# Patient Record
Sex: MALE | Race: WHITE | Employment: OTHER | URBAN - METROPOLITAN AREA
[De-identification: names, ages, dates, MRNs, and addresses within clinical notes are randomized per-mention and may not be internally consistent; named-entity substitution may affect disease eponyms.]

---

## 2017-01-23 DIAGNOSIS — R06.02 SHORTNESS OF BREATH: ICD-10-CM

## 2017-01-23 RX ORDER — TRAMADOL HYDROCHLORIDE 50 MG/1
TABLET ORAL
Qty: 90 TABLET | Refills: 2 | Status: SHIPPED | OUTPATIENT
Start: 2017-01-23 | End: 2019-02-07

## 2017-01-23 RX ORDER — LOSARTAN POTASSIUM 100 MG/1
TABLET ORAL
Qty: 30 TABLET | Refills: 2 | Status: SHIPPED | OUTPATIENT
Start: 2017-01-23 | End: 2017-03-13 | Stop reason: SDUPTHER

## 2017-02-20 ENCOUNTER — TELEPHONE (OUTPATIENT)
Dept: FAMILY MEDICINE CLINIC | Age: 62
End: 2017-02-20

## 2017-03-13 ENCOUNTER — OFFICE VISIT (OUTPATIENT)
Dept: FAMILY MEDICINE CLINIC | Age: 62
End: 2017-03-13

## 2017-03-13 VITALS
HEIGHT: 72 IN | DIASTOLIC BLOOD PRESSURE: 72 MMHG | SYSTOLIC BLOOD PRESSURE: 122 MMHG | WEIGHT: 303 LBS | HEART RATE: 80 BPM | OXYGEN SATURATION: 98 % | BODY MASS INDEX: 41.04 KG/M2 | RESPIRATION RATE: 22 BRPM

## 2017-03-13 DIAGNOSIS — Z12.5 PROSTATE CANCER SCREENING: ICD-10-CM

## 2017-03-13 DIAGNOSIS — Z00.00 ANNUAL PHYSICAL EXAM: Primary | ICD-10-CM

## 2017-03-13 DIAGNOSIS — I10 ESSENTIAL HYPERTENSION: ICD-10-CM

## 2017-03-13 DIAGNOSIS — E11.42 TYPE 2 DIABETES MELLITUS WITH DIABETIC POLYNEUROPATHY, WITHOUT LONG-TERM CURRENT USE OF INSULIN (HCC): ICD-10-CM

## 2017-03-13 PROCEDURE — 99999 PR OFFICE/OUTPT VISIT,PROCEDURE ONLY: CPT | Performed by: FAMILY MEDICINE

## 2017-03-13 RX ORDER — LOSARTAN POTASSIUM 25 MG/1
25 TABLET ORAL DAILY
Qty: 90 TABLET | Refills: 3 | Status: SHIPPED | OUTPATIENT
Start: 2017-03-13 | End: 2017-03-13 | Stop reason: SDUPTHER

## 2017-03-13 RX ORDER — LOSARTAN POTASSIUM 25 MG/1
25 TABLET ORAL DAILY
Qty: 90 TABLET | Refills: 3 | Status: ON HOLD | OUTPATIENT
Start: 2017-03-13 | End: 2017-06-09 | Stop reason: HOSPADM

## 2017-03-13 RX ORDER — TERAZOSIN 2 MG/1
2 CAPSULE ORAL NIGHTLY
COMMUNITY
End: 2017-03-21

## 2017-03-13 RX ORDER — TERAZOSIN 10 MG/1
10 CAPSULE ORAL NIGHTLY
Qty: 30 CAPSULE | Refills: 3 | Status: SHIPPED | OUTPATIENT
Start: 2017-03-13 | End: 2017-03-21 | Stop reason: SDUPTHER

## 2017-03-13 ASSESSMENT — ENCOUNTER SYMPTOMS
NAUSEA: 0
RHINORRHEA: 0
BOWEL INCONTINENCE: 0
VOMITING: 0
ABDOMINAL PAIN: 0

## 2017-03-20 ENCOUNTER — TELEPHONE (OUTPATIENT)
Dept: FAMILY MEDICINE CLINIC | Age: 62
End: 2017-03-20

## 2017-03-21 ENCOUNTER — TELEPHONE (OUTPATIENT)
Dept: FAMILY MEDICINE CLINIC | Age: 62
End: 2017-03-21

## 2017-03-21 DIAGNOSIS — I10 ESSENTIAL HYPERTENSION: ICD-10-CM

## 2017-03-21 RX ORDER — TERAZOSIN 5 MG/1
5 CAPSULE ORAL NIGHTLY
Qty: 30 CAPSULE | Refills: 5 | Status: SHIPPED | OUTPATIENT
Start: 2017-03-21 | End: 2017-03-28

## 2017-03-21 RX ORDER — TERAZOSIN 5 MG/1
5 CAPSULE ORAL NIGHTLY
Qty: 30 CAPSULE | Refills: 5 | Status: SHIPPED | OUTPATIENT
Start: 2017-03-21 | End: 2017-03-21 | Stop reason: SDUPTHER

## 2017-03-27 ENCOUNTER — TELEPHONE (OUTPATIENT)
Dept: FAMILY MEDICINE CLINIC | Age: 62
End: 2017-03-27

## 2017-03-27 RX ORDER — DOXAZOSIN 2 MG/1
2 TABLET ORAL DAILY
Qty: 30 TABLET | Refills: 3 | Status: SHIPPED | OUTPATIENT
Start: 2017-03-27 | End: 2017-03-28

## 2017-03-28 RX ORDER — TAMSULOSIN HYDROCHLORIDE 0.4 MG/1
0.4 CAPSULE ORAL DAILY
Qty: 30 CAPSULE | Refills: 3 | Status: SHIPPED | OUTPATIENT
Start: 2017-03-28 | End: 2019-02-07

## 2017-06-05 PROBLEM — N18.6 ESRD (END STAGE RENAL DISEASE) (HCC): Status: ACTIVE | Noted: 2017-06-05

## 2017-06-06 PROBLEM — J81.1 PULMONARY EDEMA: Status: ACTIVE | Noted: 2017-06-06

## 2017-06-06 PROBLEM — R33.8 ACUTE URINARY RETENTION: Status: ACTIVE | Noted: 2017-06-06

## 2017-06-06 PROBLEM — N40.0 BPH (BENIGN PROSTATIC HYPERPLASIA): Status: ACTIVE | Noted: 2017-06-06

## 2017-06-06 PROBLEM — N17.9 ARF (ACUTE RENAL FAILURE) (HCC): Status: ACTIVE | Noted: 2017-06-05

## 2017-06-06 PROBLEM — E87.20 METABOLIC ACIDOSIS: Status: ACTIVE | Noted: 2017-06-06

## 2017-06-06 PROBLEM — J81.0 ACUTE PULMONARY EDEMA (HCC): Status: ACTIVE | Noted: 2017-06-06

## 2017-06-06 PROBLEM — E87.5 HYPERKALEMIA: Status: ACTIVE | Noted: 2017-06-06

## 2017-06-06 PROBLEM — N40.0 BPH (BENIGN PROSTATIC HYPERPLASIA): Chronic | Status: ACTIVE | Noted: 2017-06-06

## 2017-06-12 ENCOUNTER — ANTI-COAG VISIT (OUTPATIENT)
Dept: PHARMACY | Facility: CLINIC | Age: 62
End: 2017-06-12

## 2017-06-12 ENCOUNTER — HOSPITAL ENCOUNTER (OUTPATIENT)
Dept: OTHER | Age: 62
Discharge: HOME | End: 2017-06-30
Attending: INTERNAL MEDICINE | Admitting: INTERNAL MEDICINE

## 2017-06-12 DIAGNOSIS — I48.0 PAROXYSMAL ATRIAL FIBRILLATION (HCC): ICD-10-CM

## 2017-06-12 LAB — INR BLD: 1.1

## 2017-06-15 ENCOUNTER — ANTI-COAG VISIT (OUTPATIENT)
Dept: PHARMACY | Facility: CLINIC | Age: 62
End: 2017-06-15

## 2017-06-15 DIAGNOSIS — I48.0 PAROXYSMAL ATRIAL FIBRILLATION (HCC): ICD-10-CM

## 2017-06-15 LAB — INR BLD: 1.4

## 2017-06-20 ENCOUNTER — ANTI-COAG VISIT (OUTPATIENT)
Dept: PHARMACY | Facility: CLINIC | Age: 62
End: 2017-06-20

## 2017-06-20 DIAGNOSIS — I48.0 PAROXYSMAL ATRIAL FIBRILLATION (HCC): ICD-10-CM

## 2017-06-20 LAB — INR BLD: 2

## 2017-06-20 RX ORDER — WARFARIN SODIUM 7.5 MG/1
TABLET ORAL
Qty: 50 TABLET | Refills: 1 | Status: SHIPPED | OUTPATIENT
Start: 2017-06-20 | End: 2018-12-01

## 2017-06-29 ENCOUNTER — ANTI-COAG VISIT (OUTPATIENT)
Dept: PHARMACY | Facility: CLINIC | Age: 62
End: 2017-06-29

## 2017-06-29 DIAGNOSIS — I48.0 PAROXYSMAL ATRIAL FIBRILLATION (HCC): ICD-10-CM

## 2017-06-29 LAB — INR BLD: 2.6

## 2018-12-01 ENCOUNTER — HOSPITAL ENCOUNTER (EMERGENCY)
Age: 63
Discharge: HOME | End: 2018-12-01
Attending: EMERGENCY MEDICINE
Payer: OTHER GOVERNMENT

## 2018-12-01 ENCOUNTER — APPOINTMENT (OUTPATIENT)
Dept: GENERAL RADIOLOGY | Age: 63
End: 2018-12-01
Payer: OTHER GOVERNMENT

## 2018-12-01 VITALS
OXYGEN SATURATION: 91 % | HEIGHT: 78 IN | HEART RATE: 113 BPM | SYSTOLIC BLOOD PRESSURE: 98 MMHG | RESPIRATION RATE: 21 BRPM | DIASTOLIC BLOOD PRESSURE: 67 MMHG | TEMPERATURE: 100.5 F | BODY MASS INDEX: 31.24 KG/M2 | WEIGHT: 270 LBS

## 2018-12-01 DIAGNOSIS — J44.1 COPD EXACERBATION (HCC): Primary | ICD-10-CM

## 2018-12-01 LAB
A/G RATIO: 1.2 (ref 1.1–2.2)
ALBUMIN SERPL-MCNC: 4 G/DL (ref 3.4–5)
ALP BLD-CCNC: 82 U/L (ref 40–129)
ALT SERPL-CCNC: 14 U/L (ref 10–40)
ANION GAP SERPL CALCULATED.3IONS-SCNC: 11 MMOL/L (ref 3–16)
AST SERPL-CCNC: 16 U/L (ref 15–37)
BASE EXCESS ARTERIAL: 1 MMOL/L (ref -3–3)
BASOPHILS ABSOLUTE: 0.1 K/UL (ref 0–0.2)
BASOPHILS RELATIVE PERCENT: 1.3 %
BILIRUB SERPL-MCNC: 0.5 MG/DL (ref 0–1)
BUN BLDV-MCNC: 17 MG/DL (ref 7–20)
CALCIUM SERPL-MCNC: 8.8 MG/DL (ref 8.3–10.6)
CARBOXYHEMOGLOBIN ARTERIAL: 3.2 % (ref 0–1.5)
CHLORIDE BLD-SCNC: 99 MMOL/L (ref 99–110)
CO2: 29 MMOL/L (ref 21–32)
CREAT SERPL-MCNC: 1.1 MG/DL (ref 0.8–1.3)
EOSINOPHILS ABSOLUTE: 0 K/UL (ref 0–0.6)
EOSINOPHILS RELATIVE PERCENT: 0.7 %
GFR AFRICAN AMERICAN: >60
GFR NON-AFRICAN AMERICAN: >60
GLOBULIN: 3.3 G/DL
GLUCOSE BLD-MCNC: 231 MG/DL (ref 70–99)
HCO3 ARTERIAL: 27.1 MMOL/L (ref 21–29)
HCT VFR BLD CALC: 43.9 % (ref 40.5–52.5)
HEMOGLOBIN, ART, EXTENDED: 14.8 G/DL (ref 13.5–17.5)
HEMOGLOBIN: 14.2 G/DL (ref 13.5–17.5)
LACTIC ACID: 1.7 MMOL/L (ref 0.4–2)
LYMPHOCYTES ABSOLUTE: 0.7 K/UL (ref 1–5.1)
LYMPHOCYTES RELATIVE PERCENT: 11.5 %
MCH RBC QN AUTO: 31.9 PG (ref 26–34)
MCHC RBC AUTO-ENTMCNC: 32.4 G/DL (ref 31–36)
MCV RBC AUTO: 98.5 FL (ref 80–100)
METHEMOGLOBIN ARTERIAL: 0.3 %
MONOCYTES ABSOLUTE: 0.4 K/UL (ref 0–1.3)
MONOCYTES RELATIVE PERCENT: 6.1 %
NEUTROPHILS ABSOLUTE: 4.9 K/UL (ref 1.7–7.7)
NEUTROPHILS RELATIVE PERCENT: 80.4 %
O2 CONTENT ARTERIAL: 19 ML/DL
O2 SAT, ARTERIAL: 92.9 %
O2 THERAPY: ABNORMAL
PCO2 ARTERIAL: 48.9 MMHG (ref 35–45)
PDW BLD-RTO: 16.7 % (ref 12.4–15.4)
PH ARTERIAL: 7.36 (ref 7.35–7.45)
PLATELET # BLD: 175 K/UL (ref 135–450)
PMV BLD AUTO: 9.5 FL (ref 5–10.5)
PO2 ARTERIAL: 68.4 MMHG (ref 75–108)
POTASSIUM SERPL-SCNC: 4.5 MMOL/L (ref 3.5–5.1)
RBC # BLD: 4.46 M/UL (ref 4.2–5.9)
SODIUM BLD-SCNC: 139 MMOL/L (ref 136–145)
TCO2 ARTERIAL: 28.6 MMOL/L
TOTAL PROTEIN: 7.3 G/DL (ref 6.4–8.2)
TROPONIN: <0.01 NG/ML
WBC # BLD: 6.1 K/UL (ref 4–11)

## 2018-12-01 PROCEDURE — 6360000002 HC RX W HCPCS: Performed by: EMERGENCY MEDICINE

## 2018-12-01 PROCEDURE — 82803 BLOOD GASES ANY COMBINATION: CPT

## 2018-12-01 PROCEDURE — 99285 EMERGENCY DEPT VISIT HI MDM: CPT

## 2018-12-01 PROCEDURE — 83605 ASSAY OF LACTIC ACID: CPT

## 2018-12-01 PROCEDURE — 96374 THER/PROPH/DIAG INJ IV PUSH: CPT

## 2018-12-01 PROCEDURE — 71045 X-RAY EXAM CHEST 1 VIEW: CPT

## 2018-12-01 PROCEDURE — 84484 ASSAY OF TROPONIN QUANT: CPT

## 2018-12-01 PROCEDURE — 93005 ELECTROCARDIOGRAM TRACING: CPT | Performed by: EMERGENCY MEDICINE

## 2018-12-01 PROCEDURE — 85025 COMPLETE CBC W/AUTO DIFF WBC: CPT

## 2018-12-01 PROCEDURE — 6370000000 HC RX 637 (ALT 250 FOR IP): Performed by: EMERGENCY MEDICINE

## 2018-12-01 PROCEDURE — 80053 COMPREHEN METABOLIC PANEL: CPT

## 2018-12-01 RX ORDER — DEXAMETHASONE SODIUM PHOSPHATE 10 MG/ML
10 INJECTION INTRAMUSCULAR; INTRAVENOUS EVERY 6 HOURS
Status: DISCONTINUED | OUTPATIENT
Start: 2018-12-01 | End: 2018-12-01 | Stop reason: CLARIF

## 2018-12-01 RX ORDER — PREDNISONE 10 MG/1
5 TABLET ORAL 2 TIMES DAILY
COMMUNITY
End: 2019-02-07

## 2018-12-01 RX ORDER — POTASSIUM CHLORIDE 750 MG/1
10 TABLET, EXTENDED RELEASE ORAL DAILY
COMMUNITY

## 2018-12-01 RX ORDER — PREDNISONE 20 MG/1
60 TABLET ORAL ONCE
Status: COMPLETED | OUTPATIENT
Start: 2018-12-01 | End: 2018-12-01

## 2018-12-01 RX ORDER — PREDNISONE 20 MG/1
TABLET ORAL
Qty: 27 TABLET | Refills: 0 | Status: SHIPPED | OUTPATIENT
Start: 2018-12-01 | End: 2019-02-07

## 2018-12-01 RX ORDER — ABIRATERONE ACETATE 250 MG/1
1000 TABLET ORAL DAILY
COMMUNITY
End: 2019-02-07

## 2018-12-01 RX ORDER — IPRATROPIUM BROMIDE AND ALBUTEROL SULFATE 2.5; .5 MG/3ML; MG/3ML
1 SOLUTION RESPIRATORY (INHALATION) EVERY 4 HOURS
Qty: 360 ML | Refills: 0 | Status: SHIPPED | OUTPATIENT
Start: 2018-12-01 | End: 2019-02-21

## 2018-12-01 RX ORDER — LEVOFLOXACIN 500 MG/1
500 TABLET, FILM COATED ORAL ONCE
Status: COMPLETED | OUTPATIENT
Start: 2018-12-01 | End: 2018-12-01

## 2018-12-01 RX ORDER — CIPROFLOXACIN 500 MG/1
500 TABLET, FILM COATED ORAL 2 TIMES DAILY
Qty: 20 TABLET | Refills: 0 | Status: SHIPPED | OUTPATIENT
Start: 2018-12-01 | End: 2018-12-11

## 2018-12-01 RX ORDER — GABAPENTIN 300 MG/1
600 CAPSULE ORAL 2 TIMES DAILY
Status: ON HOLD | COMMUNITY
End: 2019-07-27 | Stop reason: DRUGHIGH

## 2018-12-01 RX ORDER — IPRATROPIUM BROMIDE AND ALBUTEROL SULFATE 2.5; .5 MG/3ML; MG/3ML
1 SOLUTION RESPIRATORY (INHALATION) ONCE
Status: COMPLETED | OUTPATIENT
Start: 2018-12-01 | End: 2018-12-01

## 2018-12-01 RX ORDER — BROMPHENIRAMINE MALEATE, PSEUDOEPHEDRINE HYDROCHLORIDE, AND DEXTROMETHORPHAN HYDROBROMIDE 2; 30; 10 MG/5ML; MG/5ML; MG/5ML
5 SYRUP ORAL 4 TIMES DAILY PRN
Qty: 150 ML | Refills: 0 | Status: SHIPPED | OUTPATIENT
Start: 2018-12-01 | End: 2019-06-13 | Stop reason: ALTCHOICE

## 2018-12-01 RX ORDER — FUROSEMIDE 20 MG/1
40 TABLET ORAL DAILY
Status: ON HOLD | COMMUNITY
End: 2019-07-30 | Stop reason: SDUPTHER

## 2018-12-01 RX ORDER — DEXAMETHASONE SODIUM PHOSPHATE 4 MG/ML
10 INJECTION, SOLUTION INTRA-ARTICULAR; INTRALESIONAL; INTRAMUSCULAR; INTRAVENOUS; SOFT TISSUE EVERY 6 HOURS
Status: DISCONTINUED | OUTPATIENT
Start: 2018-12-01 | End: 2018-12-02 | Stop reason: HOSPADM

## 2018-12-01 RX ORDER — LISINOPRIL 10 MG/1
5 TABLET ORAL DAILY
COMMUNITY
End: 2019-02-07

## 2018-12-01 RX ORDER — GLIPIZIDE 5 MG/1
5 TABLET ORAL DAILY
COMMUNITY
End: 2021-10-16

## 2018-12-01 RX ADMIN — PREDNISONE 60 MG: 20 TABLET ORAL at 22:07

## 2018-12-01 RX ADMIN — DEXAMETHASONE SODIUM PHOSPHATE 10 MG: 4 INJECTION, SOLUTION INTRAMUSCULAR; INTRAVENOUS at 21:07

## 2018-12-01 RX ADMIN — LEVOFLOXACIN 500 MG: 500 TABLET, FILM COATED ORAL at 22:08

## 2018-12-01 RX ADMIN — IPRATROPIUM BROMIDE AND ALBUTEROL SULFATE 1 AMPULE: .5; 3 SOLUTION RESPIRATORY (INHALATION) at 21:07

## 2018-12-02 LAB
EKG ATRIAL RATE: 131 BPM
EKG DIAGNOSIS: NORMAL
EKG Q-T INTERVAL: 312 MS
EKG QRS DURATION: 86 MS
EKG QTC CALCULATION (BAZETT): 464 MS
EKG R AXIS: 186 DEGREES
EKG T AXIS: 74 DEGREES
EKG VENTRICULAR RATE: 133 BPM

## 2018-12-02 PROCEDURE — 93010 ELECTROCARDIOGRAM REPORT: CPT | Performed by: INTERNAL MEDICINE

## 2018-12-02 NOTE — ED PROVIDER NOTES
ECG of 05-JUN-2017 19:49,Incomplete right bundle branch block is no longer PresentSeptal infarct is now PresentST now depressed in Lateral leads        Radiographs (if obtained):  [] The following radiograph was interpreted by myself in the absence of a radiologist:  [x] Radiologist's Report Reviewed:  XR CHEST PORTABLE   Final Result   No acute process. EKG (if obtained): (All EKG's are interpreted by myself in theabsence of a cardiologist)  Atrial fibrillation, tachycardia, right axis deviation, no STEMI. Procedures    MDM:  After my initial evaluation, I do feel the patient can be safely discharged home so long as he improves. The patient himself does not want to be admitted to the hospital.  The patient was given a dose of Decadron through the IV, and was also started on prednisone. Patient was given a breathing treatment. Patient did receive a dose of Levaquin here in the emergency department, since I do feel that he most likely has bronchitis. The patient does continue to smoke. Patient chest x-ray does not show any signs of acute other maladies. The patient's blood work does not show any significant abnormality which I feel need to be addressed the present time. After the patient's treatment, he was feeling markedly improved, and is now requesting to be discharged home. Patient's O2 saturation is now come up to approximately 87% on 4 L, I have recommended that he stay up for these for the next 24-48 hours. The patient was given an extra dose of prednisone 60 mg here in the emergency department, and I am also going to provide him with a prescription for a 12 day taper. Patient was put on antibiotics, and was also given a prescription for nebulizer. Critical care time provided of 33 minutes, excluding any previously dictated procedures.   This time is being requested for physical examination, laboratory interpretation, medical intervention, frequent reassessments, bedside discussion,

## 2018-12-12 ENCOUNTER — HOSPITAL ENCOUNTER (OUTPATIENT)
Age: 63
Discharge: HOME | End: 2018-12-12
Payer: OTHER GOVERNMENT

## 2018-12-12 LAB
A/G RATIO: 1.4 (ref 1.1–2.2)
ALBUMIN SERPL-MCNC: 4 G/DL (ref 3.4–5)
ALP BLD-CCNC: 62 U/L (ref 40–129)
ALT SERPL-CCNC: 15 U/L (ref 10–40)
ANION GAP SERPL CALCULATED.3IONS-SCNC: 20 MMOL/L (ref 3–16)
AST SERPL-CCNC: 9 U/L (ref 15–37)
BASOPHILS ABSOLUTE: 0 K/UL (ref 0–0.2)
BASOPHILS RELATIVE PERCENT: 0.2 %
BILIRUB SERPL-MCNC: 1 MG/DL (ref 0–1)
BUN BLDV-MCNC: 26 MG/DL (ref 7–20)
CALCIUM SERPL-MCNC: 9.3 MG/DL (ref 8.3–10.6)
CEA: 6.1 NG/ML (ref 0–5)
CHLORIDE BLD-SCNC: 98 MMOL/L (ref 99–110)
CO2: 23 MMOL/L (ref 21–32)
CREAT SERPL-MCNC: 1.1 MG/DL (ref 0.8–1.3)
D DIMER: <200 NG/ML DDU (ref 0–229)
EOSINOPHILS ABSOLUTE: 0.1 K/UL (ref 0–0.6)
EOSINOPHILS RELATIVE PERCENT: 1 %
FERRITIN: 104.4 NG/ML (ref 30–400)
FOLATE: 8.26 NG/ML (ref 4.78–24.2)
GFR AFRICAN AMERICAN: >60
GFR NON-AFRICAN AMERICAN: >60
GLOBULIN: 2.8 G/DL
GLUCOSE BLD-MCNC: 96 MG/DL (ref 70–99)
HCT VFR BLD CALC: 47.5 % (ref 40.5–52.5)
HEMOGLOBIN: 15.7 G/DL (ref 13.5–17.5)
IRON SATURATION: 25 % (ref 20–50)
IRON: 92 UG/DL (ref 59–158)
LACTATE DEHYDROGENASE: 242 U/L (ref 100–190)
LYMPHOCYTES ABSOLUTE: 2 K/UL (ref 1–5.1)
LYMPHOCYTES RELATIVE PERCENT: 19.2 %
MCH RBC QN AUTO: 31.8 PG (ref 26–34)
MCHC RBC AUTO-ENTMCNC: 33 G/DL (ref 31–36)
MCV RBC AUTO: 96.4 FL (ref 80–100)
MONOCYTES ABSOLUTE: 0.3 K/UL (ref 0–1.3)
MONOCYTES RELATIVE PERCENT: 3.3 %
NEUTROPHILS ABSOLUTE: 7.9 K/UL (ref 1.7–7.7)
NEUTROPHILS RELATIVE PERCENT: 76.3 %
PDW BLD-RTO: 16 % (ref 12.4–15.4)
PLATELET # BLD: 186 K/UL (ref 135–450)
PMV BLD AUTO: 9.3 FL (ref 5–10.5)
POTASSIUM SERPL-SCNC: 4 MMOL/L (ref 3.5–5.1)
PROSTATE SPECIFIC ANTIGEN: 0.05 NG/ML (ref 0–4)
RBC # BLD: 4.92 M/UL (ref 4.2–5.9)
SEDIMENTATION RATE, ERYTHROCYTE: 4 MM/HR (ref 0–20)
SODIUM BLD-SCNC: 141 MMOL/L (ref 136–145)
T4 TOTAL: 6.2 UG/DL (ref 4.5–10.9)
TOTAL IRON BINDING CAPACITY: 365 UG/DL (ref 260–445)
TSH SERPL DL<=0.05 MIU/L-ACNC: 1.75 UIU/ML (ref 0.27–4.2)
URIC ACID, SERUM: 3.8 MG/DL (ref 3.5–7.2)
VITAMIN B-12: 379 PG/ML (ref 211–911)
VITAMIN D 25-HYDROXY: 13.1 NG/ML
WBC # BLD: 10.4 K/UL (ref 4–11)

## 2018-12-12 PROCEDURE — 84155 ASSAY OF PROTEIN SERUM: CPT

## 2018-12-12 PROCEDURE — 36415 COLL VENOUS BLD VENIPUNCTURE: CPT

## 2018-12-12 PROCEDURE — 83883 ASSAY NEPHELOMETRY NOT SPEC: CPT

## 2018-12-12 PROCEDURE — 83615 LACTATE (LD) (LDH) ENZYME: CPT

## 2018-12-12 PROCEDURE — 83540 ASSAY OF IRON: CPT

## 2018-12-12 PROCEDURE — 82306 VITAMIN D 25 HYDROXY: CPT

## 2018-12-12 PROCEDURE — 82378 CARCINOEMBRYONIC ANTIGEN: CPT

## 2018-12-12 PROCEDURE — 82728 ASSAY OF FERRITIN: CPT

## 2018-12-12 PROCEDURE — 84443 ASSAY THYROID STIM HORMONE: CPT

## 2018-12-12 PROCEDURE — 84153 ASSAY OF PSA TOTAL: CPT

## 2018-12-12 PROCEDURE — 82746 ASSAY OF FOLIC ACID SERUM: CPT

## 2018-12-12 PROCEDURE — 80053 COMPREHEN METABOLIC PANEL: CPT

## 2018-12-12 PROCEDURE — 86301 IMMUNOASSAY TUMOR CA 19-9: CPT

## 2018-12-12 PROCEDURE — 85025 COMPLETE CBC W/AUTO DIFF WBC: CPT

## 2018-12-12 PROCEDURE — 85652 RBC SED RATE AUTOMATED: CPT

## 2018-12-12 PROCEDURE — 85379 FIBRIN DEGRADATION QUANT: CPT

## 2018-12-12 PROCEDURE — 83550 IRON BINDING TEST: CPT

## 2018-12-12 PROCEDURE — 84550 ASSAY OF BLOOD/URIC ACID: CPT

## 2018-12-12 PROCEDURE — 84436 ASSAY OF TOTAL THYROXINE: CPT

## 2018-12-12 PROCEDURE — 86334 IMMUNOFIX E-PHORESIS SERUM: CPT

## 2018-12-12 PROCEDURE — 82668 ASSAY OF ERYTHROPOIETIN: CPT

## 2018-12-12 PROCEDURE — 82607 VITAMIN B-12: CPT

## 2018-12-12 PROCEDURE — 84165 PROTEIN E-PHORESIS SERUM: CPT

## 2018-12-13 LAB — ERYTHROPOIETIN: 14 MU/ML (ref 4–27)

## 2018-12-14 LAB
ALBUMIN SERPL-MCNC: 3.4 G/DL (ref 3.1–4.9)
ALPHA-1-GLOBULIN: 0.2 G/DL (ref 0.2–0.4)
ALPHA-2-GLOBULIN: 0.9 G/DL (ref 0.4–1.1)
BETA GLOBULIN: 1.4 G/DL (ref 0.9–1.6)
CA 19-9: <1 U/ML (ref 0–35)
GAMMA GLOBULIN: 0.8 G/DL (ref 0.6–1.8)
KAPPA, FREE LIGHT CHAINS, SERUM: 13.3 MG/L (ref 3.3–19.4)
KAPPA/LAMBDA RATIO: 0.44 (ref 0.26–1.65)
KAPPA/LAMBDA TEST COMMENT: ABNORMAL
LAMBDA, FREE LIGHT CHAINS, SERUM: 30.2 MG/L (ref 5.71–26.3)
M SPIKE 1 SERUM PROTEIN ELEC: 0.2 G/DL
SPE/IFE INTERPRETATION: NORMAL
TOTAL PROTEIN: 6.8 G/DL (ref 6.4–8.2)

## 2018-12-18 ENCOUNTER — HOSPITAL ENCOUNTER (OUTPATIENT)
Dept: PET IMAGING | Age: 63
Discharge: HOME | End: 2018-12-18
Payer: OTHER GOVERNMENT

## 2018-12-18 VITALS — HEIGHT: 72 IN | WEIGHT: 315 LBS | BODY MASS INDEX: 42.66 KG/M2

## 2018-12-18 DIAGNOSIS — C61 PROSTATE CANCER (HCC): ICD-10-CM

## 2018-12-18 PROCEDURE — 3430000000 HC RX DIAGNOSTIC RADIOPHARMACEUTICAL: Performed by: INTERNAL MEDICINE

## 2018-12-18 PROCEDURE — A9552 F18 FDG: HCPCS | Performed by: INTERNAL MEDICINE

## 2018-12-18 PROCEDURE — 78815 PET IMAGE W/CT SKULL-THIGH: CPT

## 2018-12-18 RX ORDER — FLUDEOXYGLUCOSE F 18 200 MCI/ML
11.22 INJECTION, SOLUTION INTRAVENOUS
Status: COMPLETED | OUTPATIENT
Start: 2018-12-18 | End: 2018-12-18

## 2018-12-18 RX ADMIN — FLUDEOXYGLUCOSE F 18 11.22 MILLICURIE: 200 INJECTION, SOLUTION INTRAVENOUS at 11:28

## 2019-01-01 ENCOUNTER — HOSPITAL ENCOUNTER (OUTPATIENT)
Age: 64
Discharge: HOME | End: 2019-01-01
Payer: OTHER GOVERNMENT

## 2019-01-01 LAB
A/G RATIO: 1.1 (ref 1.1–2.2)
ALBUMIN SERPL-MCNC: 3.7 G/DL (ref 3.4–5)
ALP BLD-CCNC: 71 U/L (ref 40–129)
ALT SERPL-CCNC: 12 U/L (ref 10–40)
ANION GAP SERPL CALCULATED.3IONS-SCNC: 11 MMOL/L (ref 3–16)
AST SERPL-CCNC: 13 U/L (ref 15–37)
BASOPHILS ABSOLUTE: 0.1 K/UL (ref 0–0.2)
BASOPHILS RELATIVE PERCENT: 1.2 %
BILIRUB SERPL-MCNC: 0.8 MG/DL (ref 0–1)
BUN BLDV-MCNC: 19 MG/DL (ref 7–20)
CALCIUM SERPL-MCNC: 9.8 MG/DL (ref 8.3–10.6)
CHLORIDE BLD-SCNC: 94 MMOL/L (ref 99–110)
CO2: 26 MMOL/L (ref 21–32)
CREAT SERPL-MCNC: 1.3 MG/DL (ref 0.8–1.3)
EOSINOPHILS ABSOLUTE: 0.1 K/UL (ref 0–0.6)
EOSINOPHILS RELATIVE PERCENT: 2.3 %
GFR AFRICAN AMERICAN: >60
GFR NON-AFRICAN AMERICAN: 55
GLOBULIN: 3.4 G/DL
GLUCOSE BLD-MCNC: 146 MG/DL (ref 70–99)
HCT VFR BLD CALC: 42.7 % (ref 40.5–52.5)
HEMOGLOBIN: 13.9 G/DL (ref 13.5–17.5)
LYMPHOCYTES ABSOLUTE: 1.2 K/UL (ref 1–5.1)
LYMPHOCYTES RELATIVE PERCENT: 18.5 %
MCH RBC QN AUTO: 31.7 PG (ref 26–34)
MCHC RBC AUTO-ENTMCNC: 32.4 G/DL (ref 31–36)
MCV RBC AUTO: 97.7 FL (ref 80–100)
MONOCYTES ABSOLUTE: 0.4 K/UL (ref 0–1.3)
MONOCYTES RELATIVE PERCENT: 6.5 %
NEUTROPHILS ABSOLUTE: 4.6 K/UL (ref 1.7–7.7)
NEUTROPHILS RELATIVE PERCENT: 71.5 %
PDW BLD-RTO: 16.6 % (ref 12.4–15.4)
PLATELET # BLD: 205 K/UL (ref 135–450)
PMV BLD AUTO: 8.8 FL (ref 5–10.5)
POTASSIUM SERPL-SCNC: 4.7 MMOL/L (ref 3.5–5.1)
RBC # BLD: 4.37 M/UL (ref 4.2–5.9)
SODIUM BLD-SCNC: 131 MMOL/L (ref 136–145)
TOTAL PROTEIN: 7.1 G/DL (ref 6.4–8.2)
WBC # BLD: 6.4 K/UL (ref 4–11)

## 2019-01-01 PROCEDURE — 80053 COMPREHEN METABOLIC PANEL: CPT

## 2019-01-01 PROCEDURE — 80061 LIPID PANEL: CPT

## 2019-01-01 PROCEDURE — 85025 COMPLETE CBC W/AUTO DIFF WBC: CPT

## 2019-01-01 PROCEDURE — 36415 COLL VENOUS BLD VENIPUNCTURE: CPT

## 2019-01-02 LAB
CHOLESTEROL, TOTAL: 168 MG/DL (ref 0–199)
HDLC SERPL-MCNC: 29 MG/DL (ref 40–60)
LDL CHOLESTEROL CALCULATED: ABNORMAL MG/DL
LDL CHOLESTEROL DIRECT: 93 MG/DL
TRIGL SERPL-MCNC: 309 MG/DL (ref 0–150)
VLDLC SERPL CALC-MCNC: ABNORMAL MG/DL

## 2019-01-18 ENCOUNTER — HOSPITAL ENCOUNTER (OUTPATIENT)
Age: 64
Discharge: HOME | End: 2019-01-18
Payer: OTHER GOVERNMENT

## 2019-01-18 LAB
A/G RATIO: 1.3 (ref 1.1–2.2)
ALBUMIN SERPL-MCNC: 4.2 G/DL (ref 3.4–5)
ALP BLD-CCNC: 63 U/L (ref 40–129)
ALT SERPL-CCNC: 14 U/L (ref 10–40)
ANION GAP SERPL CALCULATED.3IONS-SCNC: 20 MMOL/L (ref 3–16)
AST SERPL-CCNC: 16 U/L (ref 15–37)
BACTERIA: ABNORMAL /HPF
BASOPHILS ABSOLUTE: 0 K/UL (ref 0–0.2)
BASOPHILS RELATIVE PERCENT: 0.6 %
BILIRUB SERPL-MCNC: 0.9 MG/DL (ref 0–1)
BILIRUBIN URINE: ABNORMAL
BLOOD, URINE: ABNORMAL
BUN BLDV-MCNC: 12 MG/DL (ref 7–20)
CALCIUM SERPL-MCNC: 9.5 MG/DL (ref 8.3–10.6)
CEA: 4.2 NG/ML (ref 0–5)
CHLORIDE BLD-SCNC: 98 MMOL/L (ref 99–110)
CLARITY: CLEAR
CO2: 23 MMOL/L (ref 21–32)
COLOR: YELLOW
CREAT SERPL-MCNC: 1.2 MG/DL (ref 0.8–1.3)
EOSINOPHILS ABSOLUTE: 0.2 K/UL (ref 0–0.6)
EOSINOPHILS RELATIVE PERCENT: 2.9 %
EPITHELIAL CELLS, UA: ABNORMAL /HPF
GFR AFRICAN AMERICAN: >60
GFR NON-AFRICAN AMERICAN: >60
GLOBULIN: 3.3 G/DL
GLUCOSE BLD-MCNC: 124 MG/DL (ref 70–99)
GLUCOSE URINE: NEGATIVE MG/DL
HCT VFR BLD CALC: 40.8 % (ref 40.5–52.5)
HEMOGLOBIN: 13.5 G/DL (ref 13.5–17.5)
IGA: 731 MG/DL (ref 70–400)
IGG: 852 MG/DL (ref 700–1600)
IGM: 40 MG/DL (ref 40–230)
KETONES, URINE: NEGATIVE MG/DL
LACTATE DEHYDROGENASE: 192 U/L (ref 100–190)
LEUKOCYTE ESTERASE, URINE: ABNORMAL
LYMPHOCYTES ABSOLUTE: 1.1 K/UL (ref 1–5.1)
LYMPHOCYTES RELATIVE PERCENT: 19.9 %
MCH RBC QN AUTO: 31.9 PG (ref 26–34)
MCHC RBC AUTO-ENTMCNC: 33 G/DL (ref 31–36)
MCV RBC AUTO: 96.8 FL (ref 80–100)
MICROSCOPIC EXAMINATION: YES
MONOCYTES ABSOLUTE: 0.3 K/UL (ref 0–1.3)
MONOCYTES RELATIVE PERCENT: 4.8 %
NEUTROPHILS ABSOLUTE: 4.1 K/UL (ref 1.7–7.7)
NEUTROPHILS RELATIVE PERCENT: 71.8 %
NITRITE, URINE: NEGATIVE
PDW BLD-RTO: 16.5 % (ref 12.4–15.4)
PH UA: 5.5
PLATELET # BLD: 213 K/UL (ref 135–450)
PMV BLD AUTO: 9.3 FL (ref 5–10.5)
POTASSIUM SERPL-SCNC: 4.2 MMOL/L (ref 3.5–5.1)
PROSTATE SPECIFIC ANTIGEN: <0.01 NG/ML (ref 0–4)
PROTEIN UA: NEGATIVE MG/DL
RBC # BLD: 4.22 M/UL (ref 4.2–5.9)
RBC UA: ABNORMAL /HPF (ref 0–2)
SODIUM BLD-SCNC: 141 MMOL/L (ref 136–145)
SPECIFIC GRAVITY UA: 1.02
TOTAL PROTEIN: 7.5 G/DL (ref 6.4–8.2)
URINE TYPE: ABNORMAL
UROBILINOGEN, URINE: 1 E.U./DL
WBC # BLD: 5.7 K/UL (ref 4–11)
WBC UA: ABNORMAL /HPF (ref 0–5)

## 2019-01-18 PROCEDURE — 82784 ASSAY IGA/IGD/IGG/IGM EACH: CPT

## 2019-01-18 PROCEDURE — 87086 URINE CULTURE/COLONY COUNT: CPT

## 2019-01-18 PROCEDURE — 36415 COLL VENOUS BLD VENIPUNCTURE: CPT

## 2019-01-18 PROCEDURE — 84165 PROTEIN E-PHORESIS SERUM: CPT

## 2019-01-18 PROCEDURE — 84153 ASSAY OF PSA TOTAL: CPT

## 2019-01-18 PROCEDURE — 83883 ASSAY NEPHELOMETRY NOT SPEC: CPT

## 2019-01-18 PROCEDURE — 86334 IMMUNOFIX E-PHORESIS SERUM: CPT

## 2019-01-18 PROCEDURE — 86301 IMMUNOASSAY TUMOR CA 19-9: CPT

## 2019-01-18 PROCEDURE — 85025 COMPLETE CBC W/AUTO DIFF WBC: CPT

## 2019-01-18 PROCEDURE — 82378 CARCINOEMBRYONIC ANTIGEN: CPT

## 2019-01-18 PROCEDURE — 84155 ASSAY OF PROTEIN SERUM: CPT

## 2019-01-18 PROCEDURE — 83615 LACTATE (LD) (LDH) ENZYME: CPT

## 2019-01-18 PROCEDURE — 80053 COMPREHEN METABOLIC PANEL: CPT

## 2019-01-18 PROCEDURE — 81001 URINALYSIS AUTO W/SCOPE: CPT

## 2019-01-19 LAB
KAPPA, FREE LIGHT CHAINS, SERUM: 41.41 MG/L (ref 3.3–19.4)
KAPPA/LAMBDA RATIO: 0.58 (ref 0.26–1.65)
KAPPA/LAMBDA TEST COMMENT: ABNORMAL
LAMBDA, FREE LIGHT CHAINS, SERUM: 71.23 MG/L (ref 5.71–26.3)

## 2019-01-20 LAB — URINE CULTURE, ROUTINE: NORMAL

## 2019-01-22 LAB — CA 19-9: <1 U/ML (ref 0–35)

## 2019-01-23 LAB
ALBUMIN SERPL-MCNC: 3.8 G/DL (ref 3.1–4.9)
ALPHA-1-GLOBULIN: 0.4 G/DL (ref 0.2–0.4)
ALPHA-2-GLOBULIN: 0.8 G/DL (ref 0.4–1.1)
BETA GLOBULIN: 1.6 G/DL (ref 0.9–1.6)
GAMMA GLOBULIN: 0.9 G/DL (ref 0.6–1.8)
M SPIKE 1 SERUM PROTEIN ELEC: 0.3 G/DL
SPE/IFE INTERPRETATION: NORMAL

## 2019-01-24 ENCOUNTER — OFFICE VISIT (OUTPATIENT)
Dept: CARDIOLOGY CLINIC | Age: 64
End: 2019-01-24
Payer: MEDICARE

## 2019-01-24 VITALS
BODY MASS INDEX: 41.2 KG/M2 | WEIGHT: 304.2 LBS | SYSTOLIC BLOOD PRESSURE: 110 MMHG | DIASTOLIC BLOOD PRESSURE: 72 MMHG | HEIGHT: 72 IN | HEART RATE: 70 BPM | OXYGEN SATURATION: 94 %

## 2019-01-24 DIAGNOSIS — I10 ESSENTIAL HYPERTENSION: Chronic | ICD-10-CM

## 2019-01-24 DIAGNOSIS — M79.89 LEG SWELLING: ICD-10-CM

## 2019-01-24 DIAGNOSIS — R06.02 SOB (SHORTNESS OF BREATH): ICD-10-CM

## 2019-01-24 DIAGNOSIS — I48.0 PAROXYSMAL ATRIAL FIBRILLATION (HCC): Primary | Chronic | ICD-10-CM

## 2019-01-24 PROCEDURE — 99204 OFFICE O/P NEW MOD 45 MIN: CPT | Performed by: INTERNAL MEDICINE

## 2019-02-07 ENCOUNTER — HOSPITAL ENCOUNTER (OUTPATIENT)
Dept: NON INVASIVE DIAGNOSTICS | Age: 64
Discharge: HOME | End: 2019-02-07
Payer: MEDICARE

## 2019-02-07 ENCOUNTER — HOSPITAL ENCOUNTER (OUTPATIENT)
Dept: NUCLEAR MEDICINE | Age: 64
Discharge: HOME | End: 2019-02-07
Payer: MEDICARE

## 2019-02-07 DIAGNOSIS — R06.02 SOB (SHORTNESS OF BREATH): ICD-10-CM

## 2019-02-07 DIAGNOSIS — M79.89 LEG SWELLING: ICD-10-CM

## 2019-02-07 LAB
LV EF: 65 %
LV EF: 65 %
LVEF MODALITY: NORMAL
LVEF MODALITY: NORMAL

## 2019-02-07 PROCEDURE — A9502 TC99M TETROFOSMIN: HCPCS | Performed by: FAMILY MEDICINE

## 2019-02-07 PROCEDURE — 6360000004 HC RX CONTRAST MEDICATION: Performed by: INTERNAL MEDICINE

## 2019-02-07 PROCEDURE — 78452 HT MUSCLE IMAGE SPECT MULT: CPT

## 2019-02-07 PROCEDURE — 3430000000 HC RX DIAGNOSTIC RADIOPHARMACEUTICAL: Performed by: INTERNAL MEDICINE

## 2019-02-07 PROCEDURE — 93017 CV STRESS TEST TRACING ONLY: CPT

## 2019-02-07 PROCEDURE — C8929 TTE W OR WO FOL WCON,DOPPLER: HCPCS

## 2019-02-07 PROCEDURE — A9502 TC99M TETROFOSMIN: HCPCS | Performed by: INTERNAL MEDICINE

## 2019-02-07 PROCEDURE — 6360000002 HC RX W HCPCS: Performed by: INTERNAL MEDICINE

## 2019-02-07 PROCEDURE — 3430000000 HC RX DIAGNOSTIC RADIOPHARMACEUTICAL: Performed by: FAMILY MEDICINE

## 2019-02-07 RX ORDER — BICALUTAMIDE 50 MG/1
50 TABLET, FILM COATED ORAL DAILY
COMMUNITY
End: 2019-03-21 | Stop reason: CLARIF

## 2019-02-07 RX ADMIN — TETROFOSMIN 33.5 MILLICURIE: 0.23 INJECTION, POWDER, LYOPHILIZED, FOR SOLUTION INTRAVENOUS at 08:36

## 2019-02-07 RX ADMIN — TETROFOSMIN 10.7 MILLICURIE: 0.23 INJECTION, POWDER, LYOPHILIZED, FOR SOLUTION INTRAVENOUS at 07:25

## 2019-02-07 RX ADMIN — REGADENOSON 0.4 MG: 0.08 INJECTION, SOLUTION INTRAVENOUS at 08:39

## 2019-02-07 RX ADMIN — PERFLUTREN 2.2 MG: 6.52 INJECTION, SUSPENSION INTRAVENOUS at 09:22

## 2019-02-14 ENCOUNTER — HOSPITAL ENCOUNTER (OUTPATIENT)
Age: 64
Discharge: HOME | End: 2019-02-14
Payer: OTHER GOVERNMENT

## 2019-02-14 LAB
A/G RATIO: 1.2 (ref 1.1–2.2)
ALBUMIN SERPL-MCNC: 4.2 G/DL (ref 3.4–5)
ALP BLD-CCNC: 92 U/L (ref 40–129)
ALT SERPL-CCNC: 26 U/L (ref 10–40)
ANION GAP SERPL CALCULATED.3IONS-SCNC: 22 MMOL/L (ref 3–16)
AST SERPL-CCNC: 29 U/L (ref 15–37)
BASOPHILS ABSOLUTE: 0 K/UL (ref 0–0.2)
BASOPHILS RELATIVE PERCENT: 0.9 %
BILIRUB SERPL-MCNC: 0.7 MG/DL (ref 0–1)
BUN BLDV-MCNC: 18 MG/DL (ref 7–20)
CALCIUM SERPL-MCNC: 9.6 MG/DL (ref 8.3–10.6)
CEA: 5.3 NG/ML (ref 0–5)
CHLORIDE BLD-SCNC: 96 MMOL/L (ref 99–110)
CO2: 21 MMOL/L (ref 21–32)
CREAT SERPL-MCNC: 1.1 MG/DL (ref 0.8–1.3)
EOSINOPHILS ABSOLUTE: 0.2 K/UL (ref 0–0.6)
EOSINOPHILS RELATIVE PERCENT: 3.4 %
GFR AFRICAN AMERICAN: >60
GFR NON-AFRICAN AMERICAN: >60
GLOBULIN: 3.5 G/DL
GLUCOSE BLD-MCNC: 240 MG/DL (ref 70–99)
HCT VFR BLD CALC: 45.1 % (ref 40.5–52.5)
HEMOGLOBIN: 14.9 G/DL (ref 13.5–17.5)
LACTATE DEHYDROGENASE: 191 U/L (ref 100–190)
LYMPHOCYTES ABSOLUTE: 0.8 K/UL (ref 1–5.1)
LYMPHOCYTES RELATIVE PERCENT: 15.1 %
MCH RBC QN AUTO: 32.8 PG (ref 26–34)
MCHC RBC AUTO-ENTMCNC: 33.1 G/DL (ref 31–36)
MCV RBC AUTO: 99.1 FL (ref 80–100)
MONOCYTES ABSOLUTE: 0.3 K/UL (ref 0–1.3)
MONOCYTES RELATIVE PERCENT: 5.7 %
NEUTROPHILS ABSOLUTE: 3.8 K/UL (ref 1.7–7.7)
NEUTROPHILS RELATIVE PERCENT: 74.9 %
PDW BLD-RTO: 16.5 % (ref 12.4–15.4)
PLATELET # BLD: 179 K/UL (ref 135–450)
PMV BLD AUTO: 10.1 FL (ref 5–10.5)
POTASSIUM SERPL-SCNC: 4 MMOL/L (ref 3.5–5.1)
PROSTATE SPECIFIC ANTIGEN: <0.01 NG/ML (ref 0–4)
RBC # BLD: 4.54 M/UL (ref 4.2–5.9)
SODIUM BLD-SCNC: 139 MMOL/L (ref 136–145)
TOTAL PROTEIN: 7.7 G/DL (ref 6.4–8.2)
WBC # BLD: 5.1 K/UL (ref 4–11)

## 2019-02-14 PROCEDURE — 85025 COMPLETE CBC W/AUTO DIFF WBC: CPT

## 2019-02-14 PROCEDURE — 86301 IMMUNOASSAY TUMOR CA 19-9: CPT

## 2019-02-14 PROCEDURE — 84153 ASSAY OF PSA TOTAL: CPT

## 2019-02-14 PROCEDURE — 82378 CARCINOEMBRYONIC ANTIGEN: CPT

## 2019-02-14 PROCEDURE — 36415 COLL VENOUS BLD VENIPUNCTURE: CPT

## 2019-02-14 PROCEDURE — 80053 COMPREHEN METABOLIC PANEL: CPT

## 2019-02-14 PROCEDURE — 83615 LACTATE (LD) (LDH) ENZYME: CPT

## 2019-02-19 LAB — CA 19-9: <1 U/ML (ref 0–35)

## 2019-02-20 ENCOUNTER — TELEPHONE (OUTPATIENT)
Dept: CARDIOLOGY CLINIC | Age: 64
End: 2019-02-20

## 2019-02-20 DIAGNOSIS — M79.605 PAIN IN BOTH LOWER EXTREMITIES: Primary | ICD-10-CM

## 2019-02-20 DIAGNOSIS — M79.604 PAIN IN BOTH LOWER EXTREMITIES: Primary | ICD-10-CM

## 2019-02-21 ENCOUNTER — OFFICE VISIT (OUTPATIENT)
Dept: PULMONOLOGY | Age: 64
End: 2019-02-21
Payer: MEDICARE

## 2019-02-21 VITALS
HEIGHT: 72 IN | BODY MASS INDEX: 41.77 KG/M2 | HEART RATE: 79 BPM | SYSTOLIC BLOOD PRESSURE: 118 MMHG | WEIGHT: 308.4 LBS | DIASTOLIC BLOOD PRESSURE: 62 MMHG | RESPIRATION RATE: 20 BRPM | TEMPERATURE: 97.8 F | OXYGEN SATURATION: 90 %

## 2019-02-21 DIAGNOSIS — J96.11 CHRONIC RESPIRATORY FAILURE WITH HYPOXIA (HCC): Primary | ICD-10-CM

## 2019-02-21 DIAGNOSIS — I27.81 COR PULMONALE, CHRONIC (HCC): ICD-10-CM

## 2019-02-21 PROCEDURE — 99205 OFFICE O/P NEW HI 60 MIN: CPT | Performed by: INTERNAL MEDICINE

## 2019-02-21 PROCEDURE — 3017F COLORECTAL CA SCREEN DOC REV: CPT | Performed by: INTERNAL MEDICINE

## 2019-02-21 PROCEDURE — 1101F PT FALLS ASSESS-DOCD LE1/YR: CPT | Performed by: INTERNAL MEDICINE

## 2019-02-21 PROCEDURE — G8417 CALC BMI ABV UP PARAM F/U: HCPCS | Performed by: INTERNAL MEDICINE

## 2019-02-21 PROCEDURE — G8484 FLU IMMUNIZE NO ADMIN: HCPCS | Performed by: INTERNAL MEDICINE

## 2019-02-21 PROCEDURE — 4004F PT TOBACCO SCREEN RCVD TLK: CPT | Performed by: INTERNAL MEDICINE

## 2019-02-21 PROCEDURE — 1123F ACP DISCUSS/DSCN MKR DOCD: CPT | Performed by: INTERNAL MEDICINE

## 2019-02-21 PROCEDURE — 4040F PNEUMOC VAC/ADMIN/RCVD: CPT | Performed by: INTERNAL MEDICINE

## 2019-02-21 PROCEDURE — G8427 DOCREV CUR MEDS BY ELIG CLIN: HCPCS | Performed by: INTERNAL MEDICINE

## 2019-02-26 ENCOUNTER — TELEPHONE (OUTPATIENT)
Dept: VASCULAR SURGERY | Age: 64
End: 2019-02-26

## 2019-02-26 DIAGNOSIS — L03.119 CELLULITIS OF LOWER EXTREMITY, UNSPECIFIED LATERALITY: ICD-10-CM

## 2019-02-26 DIAGNOSIS — R60.0 LOCALIZED EDEMA: ICD-10-CM

## 2019-02-26 DIAGNOSIS — I87.2 VENOUS INSUFFICIENCY: Primary | ICD-10-CM

## 2019-02-26 DIAGNOSIS — I73.9 PERIPHERAL VASCULAR DISEASE, UNSPECIFIED (HCC): ICD-10-CM

## 2019-03-01 ENCOUNTER — HOSPITAL ENCOUNTER (OUTPATIENT)
Dept: VASCULAR LAB | Age: 64
Discharge: HOME | End: 2019-03-01
Payer: MEDICARE

## 2019-03-01 ENCOUNTER — OFFICE VISIT (OUTPATIENT)
Dept: VASCULAR SURGERY | Age: 64
End: 2019-03-01
Payer: MEDICARE

## 2019-03-01 VITALS
HEIGHT: 72 IN | WEIGHT: 310 LBS | DIASTOLIC BLOOD PRESSURE: 60 MMHG | SYSTOLIC BLOOD PRESSURE: 108 MMHG | BODY MASS INDEX: 41.99 KG/M2

## 2019-03-01 DIAGNOSIS — I87.2 VENOUS INSUFFICIENCY: ICD-10-CM

## 2019-03-01 DIAGNOSIS — I87.303 CHRONIC VENOUS HYPERTENSION INVOLVING BOTH SIDES: ICD-10-CM

## 2019-03-01 DIAGNOSIS — L03.119 CELLULITIS OF LOWER EXTREMITY, UNSPECIFIED LATERALITY: ICD-10-CM

## 2019-03-01 DIAGNOSIS — R60.0 LOCALIZED EDEMA: ICD-10-CM

## 2019-03-01 PROCEDURE — 99203 OFFICE O/P NEW LOW 30 MIN: CPT | Performed by: SURGERY

## 2019-03-01 PROCEDURE — 1101F PT FALLS ASSESS-DOCD LE1/YR: CPT | Performed by: SURGERY

## 2019-03-01 PROCEDURE — G8484 FLU IMMUNIZE NO ADMIN: HCPCS | Performed by: SURGERY

## 2019-03-01 PROCEDURE — 3017F COLORECTAL CA SCREEN DOC REV: CPT | Performed by: SURGERY

## 2019-03-01 PROCEDURE — G8427 DOCREV CUR MEDS BY ELIG CLIN: HCPCS | Performed by: SURGERY

## 2019-03-01 PROCEDURE — G8417 CALC BMI ABV UP PARAM F/U: HCPCS | Performed by: SURGERY

## 2019-03-01 PROCEDURE — 93970 EXTREMITY STUDY: CPT

## 2019-03-01 RX ORDER — TAMSULOSIN HYDROCHLORIDE 0.4 MG/1
0.8 CAPSULE ORAL DAILY
COMMUNITY

## 2019-03-07 ENCOUNTER — HOSPITAL ENCOUNTER (OUTPATIENT)
Dept: PULMONOLOGY | Age: 64
Discharge: HOME | End: 2019-03-07
Payer: MEDICARE

## 2019-03-07 DIAGNOSIS — J96.11 CHRONIC RESPIRATORY FAILURE WITH HYPOXIA (HCC): ICD-10-CM

## 2019-03-07 LAB
DLCO %PRED: 43 %
DLCO PRED: NORMAL ML/MIN/MMHG
DLCO/VA %PRED: NORMAL %
DLCO/VA PRED: NORMAL ML/MIN/MMHG
DLCO/VA: NORMAL ML/MIN/MMHG
DLCO: NORMAL ML/MIN/MMHG
EXPIRATORY TIME-POST: NORMAL SEC
EXPIRATORY TIME: NORMAL SEC
FEF 25-75% %CHNG: NORMAL
FEF 25-75% %PRED-POST: NORMAL %
FEF 25-75% %PRED-PRE: NORMAL L/SEC
FEF 25-75% PRED: NORMAL L/SEC
FEF 25-75%-POST: NORMAL L/SEC
FEF 25-75%-PRE: NORMAL L/SEC
FEV1 %PRED-POST: 48 %
FEV1 %PRED-PRE: 46 %
FEV1 PRED: NORMAL L
FEV1-POST: NORMAL L
FEV1-PRE: NORMAL L
FEV1/FVC %PRED-POST: NORMAL %
FEV1/FVC %PRED-PRE: NORMAL %
FEV1/FVC PRED: NORMAL %
FEV1/FVC-POST: 75 %
FEV1/FVC-PRE: 71 %
FVC %PRED-POST: NORMAL L
FVC %PRED-PRE: NORMAL %
FVC PRED: NORMAL L
FVC-POST: NORMAL L
FVC-PRE: NORMAL L
GAW %PRED: NORMAL %
GAW PRED: NORMAL L/S/CMH2O
GAW: NORMAL L/S/CMH2O
IC %PRED: NORMAL %
IC PRED: NORMAL L
IC: NORMAL L
MEP: NORMAL
MIP: NORMAL
MVV %PRED-PRE: NORMAL %
MVV PRED: NORMAL L/MIN
MVV-PRE: NORMAL L/MIN
PEF %PRED-POST: NORMAL %
PEF %PRED-PRE: NORMAL L/SEC
PEF PRED: NORMAL L/SEC
PEF%CHNG: NORMAL
PEF-POST: NORMAL L/SEC
PEF-PRE: NORMAL L/SEC
RAW %PRED: NORMAL %
RAW PRED: NORMAL CMH2O/L/S
RAW: NORMAL CMH2O/L/S
RV %PRED: NORMAL %
RV PRED: NORMAL L
RV: NORMAL L
SVC %PRED: NORMAL %
SVC PRED: NORMAL L
SVC: NORMAL L
TLC %PRED: 76 %
TLC PRED: NORMAL L
TLC: NORMAL L
VA %PRED: NORMAL %
VA PRED: NORMAL L
VA: NORMAL L
VTG %PRED: NORMAL %
VTG PRED: NORMAL L
VTG: NORMAL L

## 2019-03-07 PROCEDURE — 6360000002 HC RX W HCPCS: Performed by: INTERNAL MEDICINE

## 2019-03-07 PROCEDURE — 94640 AIRWAY INHALATION TREATMENT: CPT

## 2019-03-07 PROCEDURE — 94618 PULMONARY STRESS TESTING: CPT

## 2019-03-07 PROCEDURE — 94729 DIFFUSING CAPACITY: CPT

## 2019-03-07 PROCEDURE — 94726 PLETHYSMOGRAPHY LUNG VOLUMES: CPT

## 2019-03-07 PROCEDURE — 94060 EVALUATION OF WHEEZING: CPT

## 2019-03-07 RX ORDER — ALBUTEROL SULFATE 2.5 MG/3ML
2.5 SOLUTION RESPIRATORY (INHALATION) ONCE
Status: COMPLETED | OUTPATIENT
Start: 2019-03-07 | End: 2019-03-07

## 2019-03-07 RX ADMIN — ALBUTEROL SULFATE 2.5 MG: 2.5 SOLUTION RESPIRATORY (INHALATION) at 09:11

## 2019-03-07 ASSESSMENT — PULMONARY FUNCTION TESTS
FEV1_PERCENT_PREDICTED_POST: 48
FEV1/FVC_PRE: 71
FEV1/FVC_POST: 75
FEV1_PERCENT_PREDICTED_PRE: 46

## 2019-03-21 ENCOUNTER — OFFICE VISIT (OUTPATIENT)
Dept: PULMONOLOGY | Age: 64
End: 2019-03-21
Payer: MEDICARE

## 2019-03-21 VITALS
HEIGHT: 72 IN | DIASTOLIC BLOOD PRESSURE: 64 MMHG | HEART RATE: 109 BPM | WEIGHT: 303.8 LBS | BODY MASS INDEX: 41.15 KG/M2 | SYSTOLIC BLOOD PRESSURE: 108 MMHG | OXYGEN SATURATION: 95 % | RESPIRATION RATE: 26 BRPM

## 2019-03-21 DIAGNOSIS — J96.11 CHRONIC RESPIRATORY FAILURE WITH HYPOXIA (HCC): Primary | ICD-10-CM

## 2019-03-21 DIAGNOSIS — J44.9 CHRONIC OBSTRUCTIVE PULMONARY DISEASE, UNSPECIFIED COPD TYPE (HCC): ICD-10-CM

## 2019-03-21 DIAGNOSIS — I27.81 COR PULMONALE, CHRONIC (HCC): ICD-10-CM

## 2019-03-21 DIAGNOSIS — I27.20 PULMONARY HYPERTENSION (HCC): ICD-10-CM

## 2019-03-21 PROCEDURE — G8926 SPIRO NO PERF OR DOC: HCPCS | Performed by: INTERNAL MEDICINE

## 2019-03-21 PROCEDURE — G8427 DOCREV CUR MEDS BY ELIG CLIN: HCPCS | Performed by: INTERNAL MEDICINE

## 2019-03-21 PROCEDURE — 4040F PNEUMOC VAC/ADMIN/RCVD: CPT | Performed by: INTERNAL MEDICINE

## 2019-03-21 PROCEDURE — G8484 FLU IMMUNIZE NO ADMIN: HCPCS | Performed by: INTERNAL MEDICINE

## 2019-03-21 PROCEDURE — 3023F SPIROM DOC REV: CPT | Performed by: INTERNAL MEDICINE

## 2019-03-21 PROCEDURE — 1101F PT FALLS ASSESS-DOCD LE1/YR: CPT | Performed by: INTERNAL MEDICINE

## 2019-03-21 PROCEDURE — 3017F COLORECTAL CA SCREEN DOC REV: CPT | Performed by: INTERNAL MEDICINE

## 2019-03-21 PROCEDURE — 1123F ACP DISCUSS/DSCN MKR DOCD: CPT | Performed by: INTERNAL MEDICINE

## 2019-03-21 PROCEDURE — 99214 OFFICE O/P EST MOD 30 MIN: CPT | Performed by: INTERNAL MEDICINE

## 2019-03-21 PROCEDURE — 4004F PT TOBACCO SCREEN RCVD TLK: CPT | Performed by: INTERNAL MEDICINE

## 2019-03-21 PROCEDURE — G8417 CALC BMI ABV UP PARAM F/U: HCPCS | Performed by: INTERNAL MEDICINE

## 2019-03-21 RX ORDER — INSULIN GLARGINE 100 [IU]/ML
INJECTION, SOLUTION SUBCUTANEOUS NIGHTLY
Refills: 5 | COMMUNITY
Start: 2019-03-04

## 2019-04-26 ENCOUNTER — ANTI-COAG VISIT (OUTPATIENT)
Dept: PHARMACY | Age: 64
End: 2019-04-26

## 2019-04-30 ENCOUNTER — HOSPITAL ENCOUNTER (OUTPATIENT)
Dept: CT IMAGING | Age: 64
End: 2019-04-30
Payer: MEDICARE

## 2019-04-30 ENCOUNTER — HOSPITAL ENCOUNTER (OUTPATIENT)
Dept: CT IMAGING | Age: 64
Discharge: HOME | End: 2019-04-30
Payer: MEDICARE

## 2019-04-30 DIAGNOSIS — C61 PRIMARY PROSTATE CANCER WITH METASTASIS FROM PROSTATE TO OTHER SITE (HCC): ICD-10-CM

## 2019-04-30 PROCEDURE — 74176 CT ABD & PELVIS W/O CONTRAST: CPT

## 2019-06-13 ENCOUNTER — HOSPITAL ENCOUNTER (INPATIENT)
Age: 64
LOS: 2 days | Discharge: HOME | DRG: 189 | End: 2019-06-15
Attending: EMERGENCY MEDICINE | Admitting: INTERNAL MEDICINE
Payer: MEDICARE

## 2019-06-13 ENCOUNTER — APPOINTMENT (OUTPATIENT)
Dept: GENERAL RADIOLOGY | Age: 64
DRG: 189 | End: 2019-06-13
Payer: MEDICARE

## 2019-06-13 DIAGNOSIS — J44.1 COPD WITH ACUTE EXACERBATION (HCC): Primary | ICD-10-CM

## 2019-06-13 DIAGNOSIS — J96.20 ACUTE ON CHRONIC RESPIRATORY FAILURE, UNSPECIFIED WHETHER WITH HYPOXIA OR HYPERCAPNIA (HCC): ICD-10-CM

## 2019-06-13 LAB
A/G RATIO: 1.1 (ref 1.1–2.2)
ALBUMIN SERPL-MCNC: 4 G/DL (ref 3.4–5)
ALP BLD-CCNC: 102 U/L (ref 40–129)
ALT SERPL-CCNC: 35 U/L (ref 10–40)
ANION GAP SERPL CALCULATED.3IONS-SCNC: 12 MMOL/L (ref 3–16)
APTT: 34 SEC (ref 26–36)
AST SERPL-CCNC: 42 U/L (ref 15–37)
BASE EXCESS ARTERIAL: 1.3 MMOL/L (ref -3–3)
BASOPHILS ABSOLUTE: 0 K/UL (ref 0–0.2)
BASOPHILS RELATIVE PERCENT: 0.7 %
BILIRUB SERPL-MCNC: 0.6 MG/DL (ref 0–1)
BUN BLDV-MCNC: 13 MG/DL (ref 7–20)
CALCIUM SERPL-MCNC: 9.5 MG/DL (ref 8.3–10.6)
CARBOXYHEMOGLOBIN ARTERIAL: 4.8 % (ref 0–1.5)
CHLORIDE BLD-SCNC: 96 MMOL/L (ref 99–110)
CO2: 24 MMOL/L (ref 21–32)
CREAT SERPL-MCNC: 1.1 MG/DL (ref 0.8–1.3)
D DIMER: 200 NG/ML DDU (ref 0–229)
EKG ATRIAL RATE: 116 BPM
EKG DIAGNOSIS: NORMAL
EKG P AXIS: 62 DEGREES
EKG P-R INTERVAL: 226 MS
EKG Q-T INTERVAL: 340 MS
EKG QRS DURATION: 108 MS
EKG QTC CALCULATION (BAZETT): 472 MS
EKG R AXIS: 197 DEGREES
EKG T AXIS: 62 DEGREES
EKG VENTRICULAR RATE: 116 BPM
EOSINOPHILS ABSOLUTE: 0 K/UL (ref 0–0.6)
EOSINOPHILS RELATIVE PERCENT: 0.6 %
GFR AFRICAN AMERICAN: >60
GFR NON-AFRICAN AMERICAN: >60
GLOBULIN: 3.5 G/DL
GLUCOSE BLD-MCNC: 289 MG/DL (ref 70–99)
GLUCOSE BLD-MCNC: 465 MG/DL (ref 70–99)
GLUCOSE BLD-MCNC: 467 MG/DL (ref 70–99)
HCO3 ARTERIAL: 26.3 MMOL/L (ref 21–29)
HCT VFR BLD CALC: 43.9 % (ref 40.5–52.5)
HEMOGLOBIN, ART, EXTENDED: 14.9 G/DL (ref 13.5–17.5)
HEMOGLOBIN: 14.5 G/DL (ref 13.5–17.5)
INR BLD: 1.23 (ref 0.86–1.14)
LACTIC ACID, SEPSIS: 2.6 MMOL/L (ref 0.4–1.9)
LACTIC ACID, SEPSIS: 3.8 MMOL/L (ref 0.4–1.9)
LYMPHOCYTES ABSOLUTE: 0.9 K/UL (ref 1–5.1)
LYMPHOCYTES RELATIVE PERCENT: 12.6 %
MCH RBC QN AUTO: 32.1 PG (ref 26–34)
MCHC RBC AUTO-ENTMCNC: 33 G/DL (ref 31–36)
MCV RBC AUTO: 97.3 FL (ref 80–100)
METHEMOGLOBIN ARTERIAL: 0.2 %
MONOCYTES ABSOLUTE: 0.3 K/UL (ref 0–1.3)
MONOCYTES RELATIVE PERCENT: 4.5 %
NEUTROPHILS ABSOLUTE: 5.7 K/UL (ref 1.7–7.7)
NEUTROPHILS RELATIVE PERCENT: 81.6 %
O2 CONTENT ARTERIAL: 18 ML/DL
O2 SAT, ARTERIAL: 87.3 %
O2 THERAPY: ABNORMAL
PCO2 ARTERIAL: 43.1 MMHG (ref 35–45)
PDW BLD-RTO: 15 % (ref 12.4–15.4)
PERFORMED ON: ABNORMAL
PERFORMED ON: ABNORMAL
PH ARTERIAL: 7.4 (ref 7.35–7.45)
PLATELET # BLD: 146 K/UL (ref 135–450)
PMV BLD AUTO: 9 FL (ref 5–10.5)
PO2 ARTERIAL: 52.7 MMHG (ref 75–108)
POTASSIUM REFLEX MAGNESIUM: 5.3 MMOL/L (ref 3.5–5.1)
PRO-BNP: 484 PG/ML (ref 0–124)
PROCALCITONIN: 0.11 NG/ML (ref 0–0.15)
PROCALCITONIN: 0.12 NG/ML (ref 0–0.15)
PROTHROMBIN TIME: 14 SEC (ref 9.8–13)
RBC # BLD: 4.51 M/UL (ref 4.2–5.9)
SODIUM BLD-SCNC: 132 MMOL/L (ref 136–145)
TCO2 ARTERIAL: 27.7 MMOL/L
TOTAL PROTEIN: 7.5 G/DL (ref 6.4–8.2)
TROPONIN: <0.01 NG/ML
WBC # BLD: 7 K/UL (ref 4–11)

## 2019-06-13 PROCEDURE — 94640 AIRWAY INHALATION TREATMENT: CPT

## 2019-06-13 PROCEDURE — 6370000000 HC RX 637 (ALT 250 FOR IP): Performed by: INTERNAL MEDICINE

## 2019-06-13 PROCEDURE — 93010 ELECTROCARDIOGRAM REPORT: CPT | Performed by: INTERNAL MEDICINE

## 2019-06-13 PROCEDURE — 84145 PROCALCITONIN (PCT): CPT

## 2019-06-13 PROCEDURE — 71046 X-RAY EXAM CHEST 2 VIEWS: CPT

## 2019-06-13 PROCEDURE — 6370000000 HC RX 637 (ALT 250 FOR IP): Performed by: PHYSICIAN ASSISTANT

## 2019-06-13 PROCEDURE — 96375 TX/PRO/DX INJ NEW DRUG ADDON: CPT

## 2019-06-13 PROCEDURE — 85025 COMPLETE CBC W/AUTO DIFF WBC: CPT

## 2019-06-13 PROCEDURE — 87040 BLOOD CULTURE FOR BACTERIA: CPT

## 2019-06-13 PROCEDURE — 96365 THER/PROPH/DIAG IV INF INIT: CPT

## 2019-06-13 PROCEDURE — 96368 THER/DIAG CONCURRENT INF: CPT

## 2019-06-13 PROCEDURE — 93005 ELECTROCARDIOGRAM TRACING: CPT | Performed by: PHYSICIAN ASSISTANT

## 2019-06-13 PROCEDURE — 85379 FIBRIN DEGRADATION QUANT: CPT

## 2019-06-13 PROCEDURE — 2580000003 HC RX 258: Performed by: INTERNAL MEDICINE

## 2019-06-13 PROCEDURE — 82803 BLOOD GASES ANY COMBINATION: CPT

## 2019-06-13 PROCEDURE — 2700000000 HC OXYGEN THERAPY PER DAY

## 2019-06-13 PROCEDURE — 83880 ASSAY OF NATRIURETIC PEPTIDE: CPT

## 2019-06-13 PROCEDURE — 85730 THROMBOPLASTIN TIME PARTIAL: CPT

## 2019-06-13 PROCEDURE — 99285 EMERGENCY DEPT VISIT HI MDM: CPT

## 2019-06-13 PROCEDURE — 80053 COMPREHEN METABOLIC PANEL: CPT

## 2019-06-13 PROCEDURE — 84484 ASSAY OF TROPONIN QUANT: CPT

## 2019-06-13 PROCEDURE — 85610 PROTHROMBIN TIME: CPT

## 2019-06-13 PROCEDURE — 2580000003 HC RX 258: Performed by: PHYSICIAN ASSISTANT

## 2019-06-13 PROCEDURE — 96366 THER/PROPH/DIAG IV INF ADDON: CPT

## 2019-06-13 PROCEDURE — 6360000002 HC RX W HCPCS: Performed by: PHYSICIAN ASSISTANT

## 2019-06-13 PROCEDURE — 2000000000 HC ICU R&B

## 2019-06-13 PROCEDURE — 83605 ASSAY OF LACTIC ACID: CPT

## 2019-06-13 PROCEDURE — 36415 COLL VENOUS BLD VENIPUNCTURE: CPT

## 2019-06-13 RX ORDER — SODIUM CHLORIDE 9 MG/ML
INJECTION, SOLUTION INTRAVENOUS CONTINUOUS
Status: DISCONTINUED | OUTPATIENT
Start: 2019-06-13 | End: 2019-06-14

## 2019-06-13 RX ORDER — SODIUM CHLORIDE 0.9 % (FLUSH) 0.9 %
10 SYRINGE (ML) INJECTION PRN
Status: DISCONTINUED | OUTPATIENT
Start: 2019-06-13 | End: 2019-06-15 | Stop reason: HOSPADM

## 2019-06-13 RX ORDER — LANOLIN ALCOHOL/MO/W.PET/CERES
5-10 CREAM (GRAM) TOPICAL NIGHTLY PRN
COMMUNITY

## 2019-06-13 RX ORDER — ACETAMINOPHEN 325 MG/1
650 TABLET ORAL EVERY 4 HOURS PRN
Status: DISCONTINUED | OUTPATIENT
Start: 2019-06-13 | End: 2019-06-15 | Stop reason: HOSPADM

## 2019-06-13 RX ORDER — METOPROLOL TARTRATE 50 MG/1
50 TABLET, FILM COATED ORAL 2 TIMES DAILY
Status: DISCONTINUED | OUTPATIENT
Start: 2019-06-13 | End: 2019-06-14

## 2019-06-13 RX ORDER — 0.9 % SODIUM CHLORIDE 0.9 %
1000 INTRAVENOUS SOLUTION INTRAVENOUS ONCE
Status: COMPLETED | OUTPATIENT
Start: 2019-06-13 | End: 2019-06-14

## 2019-06-13 RX ORDER — NICOTINE POLACRILEX 4 MG
15 LOZENGE BUCCAL PRN
Status: DISCONTINUED | OUTPATIENT
Start: 2019-06-13 | End: 2019-06-15 | Stop reason: HOSPADM

## 2019-06-13 RX ORDER — METHYLPREDNISOLONE SODIUM SUCCINATE 40 MG/ML
40 INJECTION, POWDER, LYOPHILIZED, FOR SOLUTION INTRAMUSCULAR; INTRAVENOUS EVERY 6 HOURS
Status: DISCONTINUED | OUTPATIENT
Start: 2019-06-13 | End: 2019-06-14

## 2019-06-13 RX ORDER — INSULIN GLARGINE 100 [IU]/ML
20 INJECTION, SOLUTION SUBCUTANEOUS NIGHTLY
Status: DISCONTINUED | OUTPATIENT
Start: 2019-06-13 | End: 2019-06-13

## 2019-06-13 RX ORDER — FUROSEMIDE 10 MG/ML
40 INJECTION INTRAMUSCULAR; INTRAVENOUS ONCE
Status: COMPLETED | OUTPATIENT
Start: 2019-06-13 | End: 2019-06-13

## 2019-06-13 RX ORDER — MIRTAZAPINE 15 MG/1
7.5 TABLET, FILM COATED ORAL NIGHTLY
COMMUNITY
End: 2021-10-16

## 2019-06-13 RX ORDER — BICALUTAMIDE 50 MG/1
50 TABLET, FILM COATED ORAL DAILY
COMMUNITY
End: 2021-10-16

## 2019-06-13 RX ORDER — ONDANSETRON 2 MG/ML
4 INJECTION INTRAMUSCULAR; INTRAVENOUS EVERY 6 HOURS PRN
Status: DISCONTINUED | OUTPATIENT
Start: 2019-06-13 | End: 2019-06-15 | Stop reason: HOSPADM

## 2019-06-13 RX ORDER — ALBUTEROL SULFATE 2.5 MG/3ML
2.5 SOLUTION RESPIRATORY (INHALATION)
Status: DISCONTINUED | OUTPATIENT
Start: 2019-06-13 | End: 2019-06-15 | Stop reason: HOSPADM

## 2019-06-13 RX ORDER — GABAPENTIN 300 MG/1
300 CAPSULE ORAL 2 TIMES DAILY
Status: DISCONTINUED | OUTPATIENT
Start: 2019-06-13 | End: 2019-06-14

## 2019-06-13 RX ORDER — DILTIAZEM HYDROCHLORIDE 240 MG/1
240 CAPSULE, COATED, EXTENDED RELEASE ORAL DAILY
Status: DISCONTINUED | OUTPATIENT
Start: 2019-06-13 | End: 2019-06-15 | Stop reason: HOSPADM

## 2019-06-13 RX ORDER — INSULIN GLARGINE 100 [IU]/ML
20 INJECTION, SOLUTION SUBCUTANEOUS 2 TIMES DAILY
Status: DISCONTINUED | OUTPATIENT
Start: 2019-06-14 | End: 2019-06-14

## 2019-06-13 RX ORDER — FINASTERIDE 5 MG/1
5 TABLET, FILM COATED ORAL DAILY
Status: DISCONTINUED | OUTPATIENT
Start: 2019-06-13 | End: 2019-06-15 | Stop reason: HOSPADM

## 2019-06-13 RX ORDER — LEVALBUTEROL 1.25 MG/.5ML
1.26 SOLUTION, CONCENTRATE RESPIRATORY (INHALATION) ONCE
Status: COMPLETED | OUTPATIENT
Start: 2019-06-13 | End: 2019-06-13

## 2019-06-13 RX ORDER — POTASSIUM CHLORIDE 750 MG/1
10 TABLET, EXTENDED RELEASE ORAL DAILY
Status: DISCONTINUED | OUTPATIENT
Start: 2019-06-13 | End: 2019-06-13

## 2019-06-13 RX ORDER — FUROSEMIDE 40 MG/1
40 TABLET ORAL DAILY
Status: DISCONTINUED | OUTPATIENT
Start: 2019-06-13 | End: 2019-06-15 | Stop reason: HOSPADM

## 2019-06-13 RX ORDER — SODIUM CHLORIDE 0.9 % (FLUSH) 0.9 %
10 SYRINGE (ML) INJECTION EVERY 12 HOURS SCHEDULED
Status: DISCONTINUED | OUTPATIENT
Start: 2019-06-13 | End: 2019-06-15 | Stop reason: HOSPADM

## 2019-06-13 RX ORDER — DEXTROSE MONOHYDRATE 25 G/50ML
12.5 INJECTION, SOLUTION INTRAVENOUS PRN
Status: DISCONTINUED | OUTPATIENT
Start: 2019-06-13 | End: 2019-06-15 | Stop reason: HOSPADM

## 2019-06-13 RX ORDER — TAMSULOSIN HYDROCHLORIDE 0.4 MG/1
0.4 CAPSULE ORAL DAILY
Status: DISCONTINUED | OUTPATIENT
Start: 2019-06-13 | End: 2019-06-15 | Stop reason: HOSPADM

## 2019-06-13 RX ORDER — METHYLPREDNISOLONE SODIUM SUCCINATE 125 MG/2ML
125 INJECTION, POWDER, LYOPHILIZED, FOR SOLUTION INTRAMUSCULAR; INTRAVENOUS ONCE
Status: COMPLETED | OUTPATIENT
Start: 2019-06-13 | End: 2019-06-13

## 2019-06-13 RX ORDER — IPRATROPIUM BROMIDE AND ALBUTEROL SULFATE 2.5; .5 MG/3ML; MG/3ML
1 SOLUTION RESPIRATORY (INHALATION) EVERY 4 HOURS
Status: DISCONTINUED | OUTPATIENT
Start: 2019-06-13 | End: 2019-06-14

## 2019-06-13 RX ORDER — POTASSIUM CHLORIDE 750 MG/1
10 TABLET, EXTENDED RELEASE ORAL DAILY
Status: DISCONTINUED | OUTPATIENT
Start: 2019-06-14 | End: 2019-06-15 | Stop reason: HOSPADM

## 2019-06-13 RX ORDER — PREDNISONE 20 MG/1
40 TABLET ORAL DAILY
Status: DISCONTINUED | OUTPATIENT
Start: 2019-06-16 | End: 2019-06-14

## 2019-06-13 RX ORDER — IPRATROPIUM BROMIDE AND ALBUTEROL SULFATE 2.5; .5 MG/3ML; MG/3ML
1 SOLUTION RESPIRATORY (INHALATION)
Status: DISCONTINUED | OUTPATIENT
Start: 2019-06-13 | End: 2019-06-13

## 2019-06-13 RX ORDER — DEXTROSE MONOHYDRATE 50 MG/ML
100 INJECTION, SOLUTION INTRAVENOUS PRN
Status: DISCONTINUED | OUTPATIENT
Start: 2019-06-13 | End: 2019-06-15 | Stop reason: HOSPADM

## 2019-06-13 RX ADMIN — INSULIN LISPRO 18 UNITS: 100 INJECTION, SOLUTION INTRAVENOUS; SUBCUTANEOUS at 18:18

## 2019-06-13 RX ADMIN — APIXABAN 5 MG: 5 TABLET, FILM COATED ORAL at 17:50

## 2019-06-13 RX ADMIN — INSULIN GLARGINE 20 UNITS: 100 INJECTION, SOLUTION SUBCUTANEOUS at 20:32

## 2019-06-13 RX ADMIN — Medication 10 ML: at 20:39

## 2019-06-13 RX ADMIN — INSULIN LISPRO 9 UNITS: 100 INJECTION, SOLUTION INTRAVENOUS; SUBCUTANEOUS at 20:33

## 2019-06-13 RX ADMIN — METHYLPREDNISOLONE SODIUM SUCCINATE 125 MG: 125 INJECTION, POWDER, FOR SOLUTION INTRAMUSCULAR; INTRAVENOUS at 13:02

## 2019-06-13 RX ADMIN — AZITHROMYCIN DIHYDRATE 500 MG: 500 INJECTION, POWDER, LYOPHILIZED, FOR SOLUTION INTRAVENOUS at 14:04

## 2019-06-13 RX ADMIN — SODIUM CHLORIDE 1000 ML: 9 INJECTION, SOLUTION INTRAVENOUS at 22:22

## 2019-06-13 RX ADMIN — SODIUM CHLORIDE: 9 INJECTION, SOLUTION INTRAVENOUS at 22:22

## 2019-06-13 RX ADMIN — TAMSULOSIN HYDROCHLORIDE 0.4 MG: 0.4 CAPSULE ORAL at 17:50

## 2019-06-13 RX ADMIN — GABAPENTIN 300 MG: 300 CAPSULE ORAL at 20:30

## 2019-06-13 RX ADMIN — LEVALBUTEROL 1.26 MG: 1.25 SOLUTION, CONCENTRATE RESPIRATORY (INHALATION) at 13:02

## 2019-06-13 RX ADMIN — IPRATROPIUM BROMIDE AND ALBUTEROL SULFATE 1 AMPULE: .5; 3 SOLUTION RESPIRATORY (INHALATION) at 22:45

## 2019-06-13 RX ADMIN — FUROSEMIDE 40 MG: 10 INJECTION, SOLUTION INTRAMUSCULAR; INTRAVENOUS at 13:02

## 2019-06-13 RX ADMIN — CEFTRIAXONE SODIUM 1 G: 1 INJECTION, POWDER, FOR SOLUTION INTRAMUSCULAR; INTRAVENOUS at 14:04

## 2019-06-13 RX ADMIN — IPRATROPIUM BROMIDE AND ALBUTEROL SULFATE 1 AMPULE: .5; 3 SOLUTION RESPIRATORY (INHALATION) at 19:50

## 2019-06-13 RX ADMIN — METHYLPREDNISOLONE SODIUM SUCCINATE 40 MG: 40 INJECTION, POWDER, FOR SOLUTION INTRAMUSCULAR; INTRAVENOUS at 17:50

## 2019-06-13 RX ADMIN — METHYLPREDNISOLONE SODIUM SUCCINATE 40 MG: 40 INJECTION, POWDER, FOR SOLUTION INTRAMUSCULAR; INTRAVENOUS at 23:11

## 2019-06-13 RX ADMIN — METOPROLOL TARTRATE 50 MG: 50 TABLET ORAL at 20:30

## 2019-06-13 ASSESSMENT — ENCOUNTER SYMPTOMS
CHOKING: 0
STRIDOR: 0
CHEST TIGHTNESS: 0
SHORTNESS OF BREATH: 1
VOMITING: 0
NAUSEA: 0
COUGH: 1
WHEEZING: 1
APNEA: 0
GASTROINTESTINAL NEGATIVE: 1
EYES NEGATIVE: 1

## 2019-06-13 ASSESSMENT — PAIN SCALES - GENERAL
PAINLEVEL_OUTOF10: 0
PAINLEVEL_OUTOF10: 0

## 2019-06-13 NOTE — PROGRESS NOTES
RESPIRATORY THERAPY ASSESSMENT    Name:  Massiel Pineda Medical Record Number:  [id number]  Age: 71 y.o. Gender: male  : 1955  Today's Date:  2019  Room:  [facility]-    Assessment     Is the patient being admitted for a COPD or Asthma exacerbation? Yes   (If yes the patient will be seen every 4 hours for the first 24 hours and then reassessed)    Patient Admission Diagnosis      Allergies  No Known Allergies    Minimum Predicted Vital Capacity:               Actual Vital Capacity:                    Pulmonary History:COPD and Pulmonary Hypertension, MARIA ELENA  Home Oxygen Therapy:  3 liters/min via nasal cannula  Home Respiratory Therapy:Tiotropium Bromide   Current Respiratory Therapy:  duoneb Q4w/a, Albuterol Q4prn          Respiratory Severity Index(RSI)   Patients with orders for inhalation medications, oxygen, or any therapeutic treatment modality will be placed on Respiratory Protocol. They will be assessed with the first treatment and at least every 72 hours thereafter. The following severity scale will be used to determine frequency of treatment intervention.     Smoking History: Pulmonary Disease or Smoking History, Greater than 15 pack year = 2    Social History  Social History     Tobacco Use    Smoking status: Current Every Day Smoker     Packs/day: 1.00     Years: 50.00     Pack years: 50.00     Types: Cigarettes    Smokeless tobacco: Never Used    Tobacco comment: 3/21/19 1 PPD   Substance Use Topics    Alcohol use: No     Comment: SOCIALLY    Drug use: No       Recent Surgical History: None = 0  Past Surgical History  Past Surgical History:   Procedure Laterality Date    CARPAL TUNNEL RELEASE      KNEE ARTHROSCOPY      NECK SURGERY         Level of Consciousness: Alert, Oriented, and Cooperative = 0    Level of Activity: Non weight bearing- transfers bed to chair only = 3    Respiratory Pattern: Regular Pattern; RR 8-20 = 0    Breath Sounds: Diminshed bilaterally and/or b. Patients treated with HHN at Home        c. Unable to demonstrate proper use of MDI with spacer     d. RR > 30 bpm   5. Bronchodilators will be delivered via Metered Dose Inhaler (MDI), HHN, Aerogen to intubated patients on mechanical ventilation. 6. Inhalation medication orders will be delivered and/or substituted as outlined below. Aerosolized Medications Ordering and Administration Guidelines:    1. All Medications will be ordered by a physician, and their frequency and/or modality will be adjusted as defined by the patients Respiratory Severity Index (RSI) score. 2. If the patient does not have documented COPD, consider discontinuing anticholinergics when RSI is less than 9.  3. If the bronchospasm worsens (increased RSI), then the bronchodilator frequency can be increased to a maximum of every 4 hours. If greater than every 4 hours is required, the physician will be contacted. 4. If the bronchospasm improves, the frequency of the bronchodilator can be decreased, based on the patient's RSI, but not less than home treatment regimen frequency. 5. Bronchodilator(s) will be discontinued if patient has a RSI less than 9 and has received no scheduled or as needed treatment for 72  Hrs. Patients Ordered on a Mucolytic Agent:    1. Must always be administered with a bronchodilator. 2. Discontinue if patient experiences worsened bronchospasm, or secretions have lessened to the point that the patient is able to clear them with a cough. Anti-inflammatory and Combination Medications:    1. If the patient lacks prior history of lung disease, is not using inhaled anti-inflammatory medication at home, and lacks wheezing by examination or by history for at least 24 hours, contact physician for possible discontinuation.

## 2019-06-13 NOTE — PLAN OF CARE
Admit ICU   Acute hypoxic RF, now on NRB   COPD AE   Concerns for possible CAP--Azith and Rocephin   D-dimer ordered  Pulmonology consult   Wears CPAP at night   Intensivist consulted

## 2019-06-13 NOTE — ED NOTES
Family states ems has master copy of home medications. Pt and family are unsure of medications. [facility] called for list to be faxed over. [facility] states they will try to find and fax.       True Bell RN  06/13/19 [id number]

## 2019-06-13 NOTE — ED PROVIDER NOTES
I independently evaluated and obtained a history and physical on [place]. .    All diagnostic, treatment, and disposition assistants were made to myself in conjunction the advanced practice provider. For further details of this patient's emergency department encounter, please see the advanced practice provider's documentation. History: Pt with hypoxia found by EMS, 48%. Hx COPD, CPAP at night. Daughter was concerned about pt's color today. Physician Exam: Pt sitting comfortably on NC O2 at 5L. Lung sounds coarse. Pt awake, alert, oriented. MDM: Discussed medical decision making with KRYSTLE and agree with plan. Please see their note for further detail.          Rosette Sood MD  06/14/19 [id number]

## 2019-06-13 NOTE — PROGRESS NOTES
Patient to unit from ED. Report taken from Zain. V/S stable. Patient is alert and oriented and oriented to room. Bathed w/chlorhexadine wipes. Will continue to assess. 4 Eyes Admission Assessment     I agree as the admission nurse that 2 RN's have performed a thorough Head to Toe Skin Assessment on the patient. ALL assessment sites listed below have been assessed on admission. Areas assessed by both nurses: Debbie Escoto RN and Jose Valdez RN  [x]   Head, Face, and Ears   [x]   Shoulders, Back, and Chest  [x]   Arms, Elbows, and Hands   [x]   Coccyx, Sacrum, and Ischum  [x]   Legs, Feet, and Heels        Does the Patient have Skin Breakdown?   No         Peter Prevention initiated:  No   Wound Care Orders initiated:  No      [facility] nurse consulted for Pressure Injury (Stage 3,4, Unstageable, DTI, NWPT, and Complex wounds):  No      Nurse 1 eSignature: Electronically signed by Ronalee Epley, RN on 6/13/2019 at 4:54 PM  **SHARE this note so that the co-signing nurse is able to place an eSignature**    Nurse 2 eSignature: Electronically signed by Nigel Sarkar RN on 6/13/19 at 6:49 PM

## 2019-06-13 NOTE — ED PROVIDER NOTES
[id number] [facility] ENCOUNTER        Pt Name: Alin Olivarez MRN: [id number]  Birthdate 1955  Date of evaluation: 6/13/2019  Provider: Gregory Gilliland PA-C  PCP: Dyllan Michaud MD  ED Attending: No att. providers found    [facility]       Chief Complaint   Patient presents with    Respiratory Distress     Daughter noticed pt was a little gray last night, noticed today pale in color oxygen 48% EMS called. EMS placed on NRB oxygen in the 70s%. Pt wears oxygen at night 3 liters       HISTORY OF PRESENT ILLNESS   (Location/Symptom, Timing/Onset, Context/Setting, Quality, Duration, Modifying Factors, Severity)  Note limiting factors. Alin Olivarez is a 71 y.o. male presents to the ED by EMS for evaluation of worsening shortness of breath. Patient's daughter indicates that his color was very abnormal today and she checked his pulse ox which showed he had bradycardia along with hypoxia. On EMS arrival patient's oxygen was 48% on room air. He was placed on oxygen 3 L on route to the hospital.  Patient has a history of COPD and A. fib. Patient has no complaints of chest pain shortness of breath recent fevers or increasing coughing. Gradual onset of symptoms which have been over the past several days worsening since onset. To the current time worsened with exertion not improved by anything. Patient's daughter indicates he has not been using breathing treatments as she was concerned that we will put him into A. fib. Nursing Notes were all reviewed and agreed with or any disagreements were addressed  in the HPI. REVIEW OF SYSTEMS  (2-9 systems for level 4, 10 or more for level 5)     Review of Systems   Constitutional: Negative for chills and fever. Eyes: Negative. Respiratory: Positive for cough, shortness of breath and wheezing. Negative for apnea, choking, chest tightness and stridor. Cardiovascular: Positive for leg swelling.  Negative Scale  Eye Opening: Spontaneous  Best Verbal Response: Oriented  Best Motor Response: Obeys commands  [place] Coma Scale Score: 15    [facility]     Heart Score         PHYSICAL EXAM    (up to 7 for level 4, 8 ormore for level 5)     ED Triage Vitals [06/13/19 1237]   BP Temp Temp Source Pulse Resp SpO2 Height Weight   (!) 134/58 99.8 °F (37.7 °C) Oral 110 20 90 % 6' (1.829 m) 300 lb (136.1 kg)       Physical Exam   Constitutional: He appears well-developed and well-nourished. He is not intubated. HENT:   Head: Normocephalic. Nose: Nose normal.   Mouth/Throat: Oropharynx is clear and moist. No oropharyngeal exudate. Eyes: Pupils are equal, round, and reactive to light. Conjunctivae and EOM are normal. Right eye exhibits no discharge. Left eye exhibits no discharge. Neck: Normal range of motion. Cardiovascular: Normal pulses. An irregular rhythm present. Tachycardia present. PMI is not displaced. Exam reveals gallop and S3. Exam reveals no friction rub. No murmur heard. Bilateral lower extremity pedal edema +1.  + JVD. Pulmonary/Chest: No accessory muscle usage. Tachypnea noted. No bradypnea. He is not intubated. He is in respiratory distress. He has decreased breath sounds. He has no wheezes. He has rhonchi in the right lower field and the left lower field. He has rales in the right lower field and the left lower field. Abdominal: Soft. Bowel sounds are normal. He exhibits no distension. There is no tenderness. Musculoskeletal: Normal range of motion. Neurological: He is alert. Skin: Skin is warm and dry. He is not diaphoretic. Psychiatric: He has a normal mood and affect. His behavior is normal.   Nursing note and vitals reviewed.       DIAGNOSTIC RESULTS   LABS:    Labs Reviewed   CBC WITH AUTO DIFFERENTIAL - Abnormal; Notable for the following components:       Result Value    Lymphocytes # 0.9 (*)     All other components within normal limits    Narrative:     Performed at:  [facility] [address],  [place], [employer]   Phone [phone]   COMPREHENSIVE METABOLIC PANEL W/ REFLEX TO MG FOR LOW K - Abnormal; Notable for the following components:    Sodium 132 (*)     Potassium reflex Magnesium 5.3 (*)     Chloride 96 (*)     Glucose 289 (*)     AST 42 (*)     All other components within normal limits    Narrative:     Performed at:  [place] [address],  [employer]   Phone [phone]   PROTIME-INR - Abnormal; Notable for the following components:    Protime 14.0 (*)     INR 1.23 (*)     All other components within normal limits    Narrative:     Performed at:  [place] [address],  [employer]   Phone [phone]   BRAIN NATRIURETIC PEPTIDE - Abnormal; Notable for the following components:    Pro- (*)     All other components within normal limits    Narrative:     Performed at:  [place] [address],  [employer]   Phone [phone]   LACTATE, SEPSIS - Abnormal; Notable for the following components:    Lactic Acid, Sepsis 2.6 (*)     All other components within normal limits    Narrative:     Performed at:  [facility] [address],  [place], [employer]   Phone [phone]   LACTATE, SEPSIS - Abnormal; Notable for the following components:    Lactic Acid, Sepsis 3.8 (*)     All other components within normal limits    Narrative:     Performed at:  [facility] 75,  [employer]   Phone [phone]   BLOOD GAS, ARTERIAL - Abnormal; Notable for the following components:    pO2, Arterial 52.7 (*)     O2 Sat, Arterial 87.3 (*)     Carboxyhgb, Arterial 4.8 (*)     All other components within normal limits    Narrative:     Performed at:  [facility]  [address],  [place], [place]   Phone [phone]   CBC WITH AUTO DIFFERENTIAL - Abnormal; Notable for the following components:    Platelets 597 (*)     Lymphocytes # 0.5 (*)     All other components within normal limits    Narrative:     Performed at:  [facility] [address],  [place], [place]   Phone [phone]   BASIC METABOLIC PANEL W/ REFLEX TO MG FOR LOW K - Abnormal; Notable for the following components:    Sodium 134 (*)     Chloride 97 (*)     Glucose 557 (*)     BUN 21 (*)     All other components within normal limits    Narrative:     Performed at:  [facility] [address],  [place], [place]   Phone [phone]   LACTIC ACID, PLASMA - Abnormal; Notable for the following components:    Lactic Acid 3.2 (*)     All other components within normal limits    Narrative:     Performed at:  [facility] [address],  [place], [place]   Phone [phone]   LACTIC ACID, PLASMA - Abnormal; Notable for the following components:    Lactic Acid 3.6 (*)     All other components within normal limits    Narrative:     Performed at:  [facility] [address],  [place], [place]   Phone [phone]   POCT GLUCOSE - Abnormal; Notable for the following components:    POC Glucose 465 (*)     All other components within normal limits    Narrative:     Performed at:  [facility]) - [facility] 75,  [place], [place]   Phone [phone]   POCT GLUCOSE - Abnormal; Notable for the following components:    POC Glucose 467 (*)     All other components within normal limits    Narrative:     Performed at:  [facility]) [facility] 75,  [place], [place]   Phone [phone]   POCT GLUCOSE - Abnormal; Notable for the following components:    POC Glucose 487 (*)     All other components within normal limits    Narrative:     Performed at:  [facility]) - [facility] 75,  [place], [place]   Phone [phone]   POCT GLUCOSE - Abnormal; Notable for the following components:    POC Glucose 491 (*)     All other components within normal limits    Narrative:     Performed at:  [facility] 75,  [place], [place]   Phone [phone]   POCT GLUCOSE - Abnormal; Notable for the following components:    POC Glucose 463 (*)     All other components within normal limits    Narrative:     Performed at:  [place] [address],  [place], [place]   Phone [phone]   POCT GLUCOSE - Abnormal; Notable for the following components:    POC Glucose 499 (*)     All other components within normal limits    Narrative:     Performed at:  [facility] [address],  [place], [place] [employer]   Phone [phone]   POCT GLUCOSE - Abnormal; Notable for the following components:    POC Glucose 503 (*)     All other components within normal limits    Narrative:     Performed at:  [facility] [address],  [place], [place]   Phone [phone]   POCT GLUCOSE - Abnormal; Notable for the following components:    POC Glucose 513 (*)     All other components within normal limits    Narrative:     Performed at:  [place] [address],  [place], [place]   Phone [phone]   POCT GLUCOSE - Abnormal; Notable for the following components:    POC Glucose 490 (*)     All other components within normal limits    Narrative:     Performed at:  [place] [address],  [place], [place]   Phone [phone]   POCT GLUCOSE - Abnormal; Notable for the following components:    POC Glucose 451 (*)     All other components within normal limits    Narrative:     Performed at:  [facility] - [facility] 75,  [place], [place]   Phone [phone]   POCT GLUCOSE - Abnormal; Notable for the following components:    POC Glucose 392 (*)     All other components within normal limits    Narrative:     Performed at:  [facility] 75,  [place], [facility]   Phone [phone]   POCT GLUCOSE - Abnormal; Notable for the following components:    POC Glucose 280 (*)     All other components within normal limits    Narrative:     Performed at:  [place] [address],  [place], [facility]   Phone [phone]   CULTURE BLOOD #2    Narrative:     ORDER#: [id number]                          ORDERED BY: KATIUSKA GREENFIELD  SOURCE: Blood Antecubital-Left             COLLECTED:  06/13/19 13:11  ANTIBIOTICS AT MARITZA.:                      RECEIVED :  06/13/19 17:26  If child <=2 yrs old please draw pediatric bottle. ~Blood Culture #2  Performed at:  [facility]  [address] [facility] [email or web address] [address]   Phone [phone]   CULTURE BLOOD #1    Narrative:     ORDER#: [id number]                          ORDERED BY: KATIUSKA GREENFIELD  SOURCE: Blood No site given                COLLECTED:  06/13/19 12:50  ANTIBIOTICS AT MARITZA.:                      RECEIVED :  06/13/19 17:26  If child <=2 yrs old please draw pediatric bottle. ~Blood Culture #1  Performed at:  [facility]  [address] [facility] [email or web address] [address]   Phone [phone]   RESPIRATORY CULTURE   APTT    Narrative:     Performed at:  [facility] 75,  [place], [facility]   Phone [phone]   TROPONIN    Narrative:     Performed at:  [facility] 75,  [place], [facility]   Phone [phone]   PROCALCITONIN    Narrative:     Performed at:  [facility] 75,  [place], [facility]   Phone [phone]   D-DIMER, QUANTITATIVE    Narrative:     Performed at:  [facility] 75,  [place], [facility]   Phone [phone]   PROCALCITONIN    Narrative:     Performed at:  [facility]) - [facility] 75,  [place], [facility]   Phone [phone]       All other labs were within normal range or notreturned as of this dictation. EKG: All EKG's are interpreted by the Emergency Department Physician who either signs or Co-signs this chart in the absence of a cardiologist.  Please see their note for interpretation of EKG. RADIOLOGY:   Interpretation per the Radiologist below, if available at the time of this note:    XR CHEST STANDARD (2 VW)   Final Result   Borderline cardiomegaly. No focal airspace consolidation. Generalized interstitial prominence suggestive of underlying COPD. No results found.       PROCEDURES   Unless otherwise noted below, none     Procedures    CRITICAL CARE TIME   N/A    CONSULTS:  IP CONSULT TO PULMONOLOGY  IP CONSULT TO CRITICAL CARE  IP CONSULT TO SPIRITUAL SERVICES      EMERGENCY DEPARTMENT COURSE and DIFFERENTIAL DIAGNOSIS/MDM:   Vitals:    Vitals:    06/15/19 0258 06/15/19 0728 06/15/19 0745 06/15/19 1122   BP: 126/64  123/71    Pulse: 105  113    Resp: 18 18 18 18   Temp: 96.6 °F (35.9 °C)  97.3 °F (36.3 °C)    TempSrc: Oral  Oral    SpO2: 99% 96% 96% 94%   Weight:       Height:           Patient was given the following medications:  Medications   levalbuterol (XOPENEX) nebulizer solution 1.26 mg (1.26 mg Nebulization Given 6/13/19 1302)   methylPREDNISolone sodium (SOLU-MEDROL) injection 125 mg (125 mg Intravenous Given 6/13/19 1302)   furosemide (LASIX) injection 40 mg (40 mg Intravenous Given 6/13/19 1302)   cefTRIAXone (ROCEPHIN) 1 g IVPB in 50 mL D5W minibag (0 g Intravenous Stopped 6/13/19 1438)   azithromycin (ZITHROMAX) 500 mg in D5W 250ml addavial (0 mg Intravenous Stopped 6/13/19 1614)   0.9 % sodium chloride bolus (0 mLs Intravenous Stopped 6/14/19 0038)   insulin lispro (HUMALOG) injection vial 10 Units (10 Units Subcutaneous Given 6/14/19 0057)   insulin glargine (LANTUS) injection vial 10 Units (10 Units Subcutaneous Given 6/14/19 0057)   insulin glargine (LANTUS) injection vial 10 Units (10 Units Subcutaneous Given 6/14/19 0628)   furosemide (LASIX) injection 20 mg (20 mg Intravenous Given 6/14/19 1146)       Afebrile, stable, patient presents to the ED for evaluation. Seen in conjunction with attending ED provider who agrees with assessment and plan. Patients PO2 is 48% on room air. Pt is typically on CPAP at night however does not use Oxygen during the day. I have reviewed patients old records and Medical History. Labs evaluated reveal hyponatremia decreased chloride at 132 and 96. Glucose of 289 hyperglycemia. BNP is elevated at 484 no elevation in troponin patient's initial lactic acid is elevated however we do not want to give him IV fluids as there is concern for possible fluid overload. Chest x-ray shows cardiomegaly and underlying COPD. Patient was given breathing treatments in addition to Lasix this does not help his symptoms. Patient is requiring a nonrebreather to keep his oxygen saturations up on 4 to 6 L patient still continues to desaturate. Also the hospitalist who agrees to admit the patient  The patient tolerated their visit well. They were seen and evaluated by the attendingphysician, No att. providers found who agreed with the assessment and plan. The patient and / or the family were informed of the results of any tests, a time was given to answer questions, a plan was proposed and they agreed Sonia Zazueta. FINAL IMPRESSION      1. COPD with acute exacerbation ([facility] [address]    2.  Acute on chronic respiratory failure, unspecified whether with hypoxia or hypercapnia (HCC)          DISPOSITION/PLAN   DISPOSITION        PATIENT REFERRED TO:  Suly Marquze MD  [place] [id number] [phone]            DISCHARGE MEDICATIONS:  Discharge Medication List as of 6/15/2019 11:51 AM      START taking these medications    Details   predniSONE (DELTASONE) 10 MG tablet 2 qd- 3 days 1 qd- 3 days, Disp-9 tablet, R-0Normal      azithromycin (ZITHROMAX) 250 MG tablet Take 1 tablet by mouth daily for 3 days, Disp-3 tablet, R-0Normal             DISCONTINUED MEDICATIONS:  Discharge Medication List as of 6/15/2019 11:51 AM      STOP taking these medications       metFORMIN (GLUCOPHAGE) 500 MG tablet Comments:   Reason for Stopping:                  (Please note that portions of this note were completed with a voice recognition program.  Efforts were made to edit the dictations but occasionally words are mis-transcribed.)    Jefferson Pavon PA-C (electronically signed)        Jefferson Pavon PA-C  06/15/19 5232

## 2019-06-14 LAB
ANION GAP SERPL CALCULATED.3IONS-SCNC: 16 MMOL/L (ref 3–16)
BASOPHILS ABSOLUTE: 0 K/UL (ref 0–0.2)
BASOPHILS RELATIVE PERCENT: 0.1 %
BUN BLDV-MCNC: 21 MG/DL (ref 7–20)
CALCIUM SERPL-MCNC: 9.3 MG/DL (ref 8.3–10.6)
CHLORIDE BLD-SCNC: 97 MMOL/L (ref 99–110)
CO2: 21 MMOL/L (ref 21–32)
CREAT SERPL-MCNC: 1.1 MG/DL (ref 0.8–1.3)
EOSINOPHILS ABSOLUTE: 0 K/UL (ref 0–0.6)
EOSINOPHILS RELATIVE PERCENT: 0 %
GFR AFRICAN AMERICAN: >60
GFR NON-AFRICAN AMERICAN: >60
GLUCOSE BLD-MCNC: 463 MG/DL (ref 70–99)
GLUCOSE BLD-MCNC: 487 MG/DL (ref 70–99)
GLUCOSE BLD-MCNC: 490 MG/DL (ref 70–99)
GLUCOSE BLD-MCNC: 491 MG/DL (ref 70–99)
GLUCOSE BLD-MCNC: 499 MG/DL (ref 70–99)
GLUCOSE BLD-MCNC: 503 MG/DL (ref 70–99)
GLUCOSE BLD-MCNC: 513 MG/DL (ref 70–99)
GLUCOSE BLD-MCNC: 557 MG/DL (ref 70–99)
HCT VFR BLD CALC: 43 % (ref 40.5–52.5)
HEMOGLOBIN: 14.2 G/DL (ref 13.5–17.5)
LACTIC ACID: 3.2 MMOL/L (ref 0.4–2)
LACTIC ACID: 3.6 MMOL/L (ref 0.4–2)
LYMPHOCYTES ABSOLUTE: 0.5 K/UL (ref 1–5.1)
LYMPHOCYTES RELATIVE PERCENT: 6.8 %
MCH RBC QN AUTO: 32.7 PG (ref 26–34)
MCHC RBC AUTO-ENTMCNC: 33.1 G/DL (ref 31–36)
MCV RBC AUTO: 99 FL (ref 80–100)
MONOCYTES ABSOLUTE: 0.1 K/UL (ref 0–1.3)
MONOCYTES RELATIVE PERCENT: 1.4 %
NEUTROPHILS ABSOLUTE: 6.4 K/UL (ref 1.7–7.7)
NEUTROPHILS RELATIVE PERCENT: 91.7 %
PDW BLD-RTO: 14.9 % (ref 12.4–15.4)
PERFORMED ON: ABNORMAL
PLATELET # BLD: 116 K/UL (ref 135–450)
PMV BLD AUTO: 9.7 FL (ref 5–10.5)
POTASSIUM REFLEX MAGNESIUM: 4.5 MMOL/L (ref 3.5–5.1)
RBC # BLD: 4.34 M/UL (ref 4.2–5.9)
SODIUM BLD-SCNC: 134 MMOL/L (ref 136–145)
WBC # BLD: 7 K/UL (ref 4–11)

## 2019-06-14 PROCEDURE — 6370000000 HC RX 637 (ALT 250 FOR IP): Performed by: INTERNAL MEDICINE

## 2019-06-14 PROCEDURE — 97161 PT EVAL LOW COMPLEX 20 MIN: CPT

## 2019-06-14 PROCEDURE — 1200000000 HC SEMI PRIVATE

## 2019-06-14 PROCEDURE — 6360000002 HC RX W HCPCS: Performed by: INTERNAL MEDICINE

## 2019-06-14 PROCEDURE — 99223 1ST HOSP IP/OBS HIGH 75: CPT | Performed by: INTERNAL MEDICINE

## 2019-06-14 PROCEDURE — 97530 THERAPEUTIC ACTIVITIES: CPT

## 2019-06-14 PROCEDURE — 94761 N-INVAS EAR/PLS OXIMETRY MLT: CPT

## 2019-06-14 PROCEDURE — 99232 SBSQ HOSP IP/OBS MODERATE 35: CPT | Performed by: INTERNAL MEDICINE

## 2019-06-14 PROCEDURE — 2700000000 HC OXYGEN THERAPY PER DAY

## 2019-06-14 PROCEDURE — 6370000000 HC RX 637 (ALT 250 FOR IP): Performed by: PHYSICIAN ASSISTANT

## 2019-06-14 PROCEDURE — 36415 COLL VENOUS BLD VENIPUNCTURE: CPT

## 2019-06-14 PROCEDURE — 83605 ASSAY OF LACTIC ACID: CPT

## 2019-06-14 PROCEDURE — 97165 OT EVAL LOW COMPLEX 30 MIN: CPT

## 2019-06-14 PROCEDURE — 85025 COMPLETE CBC W/AUTO DIFF WBC: CPT

## 2019-06-14 PROCEDURE — 80048 BASIC METABOLIC PNL TOTAL CA: CPT

## 2019-06-14 PROCEDURE — 2580000003 HC RX 258: Performed by: INTERNAL MEDICINE

## 2019-06-14 PROCEDURE — 2580000003 HC RX 258: Performed by: PHYSICIAN ASSISTANT

## 2019-06-14 PROCEDURE — 94640 AIRWAY INHALATION TREATMENT: CPT

## 2019-06-14 PROCEDURE — 6360000002 HC RX W HCPCS: Performed by: PHYSICIAN ASSISTANT

## 2019-06-14 RX ORDER — INSULIN GLARGINE 100 [IU]/ML
10 INJECTION, SOLUTION SUBCUTANEOUS ONCE
Status: COMPLETED | OUTPATIENT
Start: 2019-06-14 | End: 2019-06-14

## 2019-06-14 RX ORDER — INSULIN GLARGINE 100 [IU]/ML
30 INJECTION, SOLUTION SUBCUTANEOUS 2 TIMES DAILY
Status: DISCONTINUED | OUTPATIENT
Start: 2019-06-14 | End: 2019-06-14

## 2019-06-14 RX ORDER — IPRATROPIUM BROMIDE AND ALBUTEROL SULFATE 2.5; .5 MG/3ML; MG/3ML
1 SOLUTION RESPIRATORY (INHALATION)
Status: DISCONTINUED | OUTPATIENT
Start: 2019-06-14 | End: 2019-06-15 | Stop reason: HOSPADM

## 2019-06-14 RX ORDER — FUROSEMIDE 10 MG/ML
20 INJECTION INTRAMUSCULAR; INTRAVENOUS ONCE
Status: COMPLETED | OUTPATIENT
Start: 2019-06-14 | End: 2019-06-14

## 2019-06-14 RX ORDER — INSULIN GLARGINE 100 [IU]/ML
45 INJECTION, SOLUTION SUBCUTANEOUS 2 TIMES DAILY
Status: DISCONTINUED | OUTPATIENT
Start: 2019-06-14 | End: 2019-06-15 | Stop reason: HOSPADM

## 2019-06-14 RX ORDER — AZITHROMYCIN 250 MG/1
250 TABLET, FILM COATED ORAL DAILY
Status: DISCONTINUED | OUTPATIENT
Start: 2019-06-14 | End: 2019-06-15 | Stop reason: HOSPADM

## 2019-06-14 RX ORDER — GABAPENTIN 300 MG/1
600 CAPSULE ORAL 2 TIMES DAILY
Status: DISCONTINUED | OUTPATIENT
Start: 2019-06-14 | End: 2019-06-15 | Stop reason: HOSPADM

## 2019-06-14 RX ADMIN — METOPROLOL TARTRATE 25 MG: 25 TABLET ORAL at 08:13

## 2019-06-14 RX ADMIN — TAMSULOSIN HYDROCHLORIDE 0.4 MG: 0.4 CAPSULE ORAL at 08:13

## 2019-06-14 RX ADMIN — FINASTERIDE 5 MG: 5 TABLET, FILM COATED ORAL at 08:13

## 2019-06-14 RX ADMIN — INSULIN LISPRO 9 UNITS: 100 INJECTION, SOLUTION INTRAVENOUS; SUBCUTANEOUS at 21:01

## 2019-06-14 RX ADMIN — FUROSEMIDE 20 MG: 10 INJECTION, SOLUTION INTRAMUSCULAR; INTRAVENOUS at 11:46

## 2019-06-14 RX ADMIN — FUROSEMIDE 40 MG: 40 TABLET ORAL at 08:13

## 2019-06-14 RX ADMIN — INSULIN GLARGINE 30 UNITS: 100 INJECTION, SOLUTION SUBCUTANEOUS at 08:31

## 2019-06-14 RX ADMIN — AZITHROMYCIN MONOHYDRATE 250 MG: 250 TABLET ORAL at 13:06

## 2019-06-14 RX ADMIN — IPRATROPIUM BROMIDE AND ALBUTEROL SULFATE 1 AMPULE: .5; 3 SOLUTION RESPIRATORY (INHALATION) at 22:38

## 2019-06-14 RX ADMIN — APIXABAN 5 MG: 5 TABLET, FILM COATED ORAL at 08:13

## 2019-06-14 RX ADMIN — INSULIN GLARGINE 10 UNITS: 100 INJECTION, SOLUTION SUBCUTANEOUS at 00:57

## 2019-06-14 RX ADMIN — INSULIN LISPRO 10 UNITS: 100 INJECTION, SOLUTION INTRAVENOUS; SUBCUTANEOUS at 00:57

## 2019-06-14 RX ADMIN — APIXABAN 5 MG: 5 TABLET, FILM COATED ORAL at 20:50

## 2019-06-14 RX ADMIN — INSULIN GLARGINE 10 UNITS: 100 INJECTION, SOLUTION SUBCUTANEOUS at 06:28

## 2019-06-14 RX ADMIN — DILTIAZEM HYDROCHLORIDE 240 MG: 240 CAPSULE, COATED, EXTENDED RELEASE ORAL at 08:13

## 2019-06-14 RX ADMIN — INSULIN LISPRO 18 UNITS: 100 INJECTION, SOLUTION INTRAVENOUS; SUBCUTANEOUS at 11:44

## 2019-06-14 RX ADMIN — IPRATROPIUM BROMIDE AND ALBUTEROL SULFATE 1 AMPULE: .5; 3 SOLUTION RESPIRATORY (INHALATION) at 03:15

## 2019-06-14 RX ADMIN — IPRATROPIUM BROMIDE AND ALBUTEROL SULFATE 1 AMPULE: .5; 3 SOLUTION RESPIRATORY (INHALATION) at 16:05

## 2019-06-14 RX ADMIN — INSULIN LISPRO 18 UNITS: 100 INJECTION, SOLUTION INTRAVENOUS; SUBCUTANEOUS at 08:13

## 2019-06-14 RX ADMIN — IPRATROPIUM BROMIDE AND ALBUTEROL SULFATE 1 AMPULE: .5; 3 SOLUTION RESPIRATORY (INHALATION) at 07:05

## 2019-06-14 RX ADMIN — Medication 10 ML: at 20:50

## 2019-06-14 RX ADMIN — GABAPENTIN 600 MG: 300 CAPSULE ORAL at 20:50

## 2019-06-14 RX ADMIN — INSULIN GLARGINE 45 UNITS: 100 INJECTION, SOLUTION SUBCUTANEOUS at 20:59

## 2019-06-14 RX ADMIN — INSULIN LISPRO 18 UNITS: 100 INJECTION, SOLUTION INTRAVENOUS; SUBCUTANEOUS at 16:31

## 2019-06-14 RX ADMIN — IPRATROPIUM BROMIDE AND ALBUTEROL SULFATE 1 AMPULE: .5; 3 SOLUTION RESPIRATORY (INHALATION) at 19:08

## 2019-06-14 RX ADMIN — Medication 10 ML: at 08:17

## 2019-06-14 RX ADMIN — GABAPENTIN 600 MG: 300 CAPSULE ORAL at 08:13

## 2019-06-14 RX ADMIN — MUPIROCIN: 20 OINTMENT TOPICAL at 20:49

## 2019-06-14 RX ADMIN — METOPROLOL TARTRATE 25 MG: 25 TABLET ORAL at 22:00

## 2019-06-14 RX ADMIN — METHYLPREDNISOLONE SODIUM SUCCINATE 40 MG: 40 INJECTION, POWDER, FOR SOLUTION INTRAMUSCULAR; INTRAVENOUS at 04:43

## 2019-06-14 RX ADMIN — POTASSIUM CHLORIDE 10 MEQ: 10 TABLET, EXTENDED RELEASE ORAL at 08:30

## 2019-06-14 RX ADMIN — MUPIROCIN: 20 OINTMENT TOPICAL at 08:33

## 2019-06-14 NOTE — PROGRESS NOTES
Patient went to use urinal and pulled IV out on accident. Notified MD and is fine if patient has just once IV access. MD ordered 10 units of lantus and sliding for . Will continue to monitor.

## 2019-06-14 NOTE — PROGRESS NOTES
Report given to [facility]. Pt. Stable. Care transferred at this time.

## 2019-06-14 NOTE — PROGRESS NOTES
Pt alert and oriented. AM meds complete. Pt tolerated well. VSS and WDL. No s/s of distress. No further needs noted at this time. Will continue to monitor and assess.

## 2019-06-14 NOTE — PROGRESS NOTES
ICU transfer. Dinner delivered to room. S/S coverage for dinner ICU. A/O x 4. Up in chair. Spouse at bedside. Call light within reach.

## 2019-06-14 NOTE — PROGRESS NOTES
Inpatient Occupational Therapy  Evaluation and Treatment    Unit: ICU  Date:  6/14/2019  Patient Name:    Denver Eddy. Admitting diagnosis:  Acute respiratory failure with hypoxia ([facility] [address] [J96.01]  Admit Date:  6/13/2019  Precautions/Restrictions/WB Status/ Lines/ Wounds/ Oxygen: fall risk, IV, supplemental o2 (5L) and ICU monitoring    Treatment Time:  7520-9025  Treatment Number: 1   Billable Treatment Time: 14 minutes   Total Treatment Time:   24   minutes    Patient Goals for Therapy:  \" go home \"      Discharge Recommendations: Home with PRN assist  DME needs for discharge: Needs Met (may request new O2 equipment from [facility] to accommodate increased O2 demands)       Therapy recommendations for staff:   Assist of 1 with use of No AD for all transfers or ambulation to/from chair    Home Health S4 Level Recommendation:  NA  AM-PAC Score: 19    Preadmission Environment    Pt. Lives With Family; pt's spouse passed away about a year and a half ago, and reports being diagnosed with prostate cancer the next day (completed 45 days of radiation, finished approx. 1 month ago)   Home environment:    split level home  Steps to enter first floor: 2 Steps to enter and One Hand rail  Steps to second floor: Partial flight and One Hand Rail  Bathroom: Bath [address] [place] [address]  Equipment owned: [facility], Rolling Walker, Rollator , Shower Chair, BSC, home O2 (3-4 L) PRN, pulse ox and inhaler, nebulizer     Preadmission Status:  Pt. Able to drive: Yes  Pt Fully independent with ADLs: Yes except dtr assists with LB dressing due to back problems; IND with yard work  Pt. Required assistance from family for: Cleaning, Cooking and Laundry   Pt. Fully independent for transfers and gait and walked with No Device  History of falls No   Reports he did not have his O2 on when he desaturated just prior to this admission.      Pain  No  Rating:NA  Location:  Pain Medicine Status: Denies need      Cognition    A&O x4   Able to follow 2 step commands    Subjective  Patient lying supine in bed with family present-dtr stepped out of room during session  Pt agreeable to this OT eval & tx. Upper Extremity ROM:    WFL    Upper Extremity Strength:    Strength Assessment (measured on a 0-5 scale):   5/5 overall bilaterally    Upper Extremity Sensation    WFL    Upper Extremity Proprioception:   WFL    Coordination and Tone  WFL    Balance  Functional Sitting Balance:  WFL  Functional Standing Balance:WFL    Bed mobility:    Supine to sit:   Not Tested  Sit to supine:   Not Tested  Scooting to head of bed:   Not Tested  Scooting in sitting:  Not Tested  Rolling:   Not Tested  Bridging:   Not Tested     Up in chair at beginning and end of session    Transfers:    Sit to stand:  Independent  Stand to sit: Independent  Bed to [facility]:  Not Tested  Bed to chair:   Not Tested  Standard toilet: Not Tested    Activity Tolerance   Pt completed therapy session with No adverse symptoms  SpO2: 100-118bpm  HR: 88-92% with activity on 5L  BP:     Dressing:      UE:   Not Tested  LE:    Max A don socks    Bathing:    UE:  Not Tested  LE:  Not Tested    Eating:   Independent    Toileting:  Not Tested    Positioning Needs:   Up in chair, call light and needs in reach. Exercise / Activities Initiated:   N/A    Patient/Family Education:   Role of OT  Recommendations for DC  Oxygen tubing management    Assessment of Deficits: Pt seen for Occupational therapy evaluation in acute care setting. Pt demonstrated decreased Activity Tolerance and ADL's. Pt functioning below baseline and will likely benefit from skilled occupational therapy services to maximize safety and independence. Goal(s) : To be met in 3 Visits:  1). Bed to toilet/BSC: Independent    To be met in 5 Visits:  1). Supine to Sit: Independent  2). Upper Body Bathing:  Independent  3). Lower Body Bathing:  Min A  4). Upper Body Dressing: Independent  5). Lower Body Dressing: Min A  6).  Pt to brad UE exs x 15 reps    Rehabilitation Potential:  Good for goals listed above. Strengths for achieving goals include: Pt motivated, PLOF, Family Support and Pt cooperative  Barriers to achieving goals include:  Complexity of condition and Pain      Plan: To be seen 2-3 x/wk  while in acute care setting for therapeutic exercises, bed mobility, transfers, dressing, bathing, family/patient education with adaptive equipment, breathing technique instruction.      Urszula Allred, OTR/L [id number]            If patient discharges from this facility prior to next visit, this note will serve as the Discharge Summary

## 2019-06-14 NOTE — PROGRESS NOTES
Dr Ata Quintero updated on patient condition including glucose and oxygen demand. Notified that patient's continuous fluids order of NS at 100 is still ordered despite the patient receiving IV Lasix.

## 2019-06-14 NOTE — PROGRESS NOTES
Care rounds completed w/care team. Dr Otto Marroquin updated on high glucose and current condition. Dr Dang Danielle paged at 11:51 re: glucose of 499.

## 2019-06-14 NOTE — PROGRESS NOTES
Gait: Ambulate 150 ft   with  Supervision  and use of No AD  3). Tolerate B LE exercises 3 sets of 10-15 reps  4). Ascend/descend 7 steps with SBA with use of One Hand rail and No AD. Rehabilitation Potential: Good  Strengths for achieving goals include:   Pt motivated, PLOF, Family Support and Pt cooperative  Barriers to achieving goals include:    No barriers     Plan    To be seen 2-3 x / week  while in acute care setting for therapeutic exercises, bed mobility, transfers, progressive gait training, balance training, and family/patient education. Alida Gimenez, PT, DPT #[id number]     If patient discharges from this facility prior to next visit, this note will serve as the Discharge Summary.

## 2019-06-14 NOTE — PROGRESS NOTES
Progress Note    Admit Date:  6/13/2019    Admitted with acute hypoxic respiratory failure, exacerbation of COPD. Required high flow oxygen 8 L on admission  , Currently down to 4 L. At home he is on 3 L of oxygen and CPAP at night   . Subjective:  Mr. Humphrey Alcantar feels well. No shortness of breath. No fevers or chills. Blood sugars trending high, insulin dose adjusted and steroid dose decreased. Objective:   /72   Pulse 84   Temp 97.9 °F (36.6 °C) (Oral)   Resp 19   Ht 6' (1.829 m)   Wt 296 lb 12.8 oz (134.6 kg)   SpO2 90%   BMI 40.25 kg/m²       Intake/Output Summary (Last 24 hours) at 6/14/2019 1537  Last data filed at 6/14/2019 1435  Gross per 24 hour   Intake 3033 ml   Output 5025 ml   Net -1992 ml         Physical Exam:  General:  Awake, alert, NAD  Obese  Skin:  Warm and dry  Neck:  JVD absent. Neck supple  Chest: Diminished breath sounds radiology. No wheezes, rales or rhonchi. Cardiovascular:  RRR ,S1S2 normal  Abdomen:  Soft, non tender, non distended, BS +  Extremities:  No edema. Intact peripheral pulses.  Brisk cap refill, < 2 secs  Neuro: non focal      Medications:   Scheduled Meds:   metoprolol tartrate  25 mg Oral BID    gabapentin  600 mg Oral BID    mupirocin   Nasal BID    insulin glargine  45 Units Subcutaneous BID    azithromycin  250 mg Oral Daily    [START ON 6/15/2019] predniSONE  30 mg Oral Daily    ipratropium-albuterol  1 ampule Inhalation Q4H WA    apixaban  5 mg Oral BID    diltiazem  240 mg Oral Daily    finasteride  5 mg Oral Daily    furosemide  40 mg Oral Daily    tamsulosin  0.4 mg Oral Daily    sodium chloride flush  10 mL Intravenous 2 times per day    potassium chloride  10 mEq Oral Daily    insulin lispro  0-18 Units Subcutaneous TID WC    insulin lispro  0-9 Units Subcutaneous Nightly       Continuous Infusions:   dextrose         Data:  CBC:   Recent Labs     06/13/19  1250 06/14/19  0449   WBC 7.0 7.0   RBC 4.51 4.34   HGB 14.5

## 2019-06-14 NOTE — PROGRESS NOTES
Bedside report given and pt care transferred to Sherin Alpers. Pt denies needs at this time. Call light within reach.

## 2019-06-14 NOTE — PROGRESS NOTES
cough  Cough: Strong, spontaneous, non-productive = 0    Vital Signs   /88   Pulse 98   Temp 97.9 °F (36.6 °C) (Oral)   Resp 20   Ht 6' (1.829 m)   Wt 296 lb 12.8 oz (134.6 kg)   SpO2 94%   BMI 40.25 kg/m²   SPO2 (COPD values may differ): 86-87% on room air or greater than 92% on FiO2 35- 50% = 3    Peak Flow (asthma only): not applicable = 0    RSI: 4-41 = TID (three times daily) and Q4hr PRN for dyspnea        Plan       Goals: medication delivery, mobilize retained secretions, volume expansion and improve oxygenation    Patient/caregiver was educated on the proper method of use for Respiratory Care Devices:  Yes      Level of patient/caregiver understanding able to:   ? Verbalize understanding   ? Demonstrate understanding       ? Teach back        ? Needs reinforcement       ? No available caregiver               ? Other:     Response to education:  Very Good     Is patient being placed on Home Treatment Regimen? No     Does the patient have everything they need prior to discharge? NA     Comments: chart reviewed and patient assessed    Plan of Care: q 4 w/a and prn    Electronically signed by Levi Parker RCP on 6/14/2019 at 2:10 PM    Respiratory Protocol Guidelines     1. Assessment and treatment by Respiratory Therapy will be initiated for medication and therapeutic interventions upon initiation of aerosolized medication. 2. Physician will be contacted for respiratory rate (RR) greater than 35 breaths per minute. Therapy will be held for heart rate (HR) greater than 140 beats per minute, pending direction from physician. 3. Bronchodilators will be administered via Metered Dose Inhaler (MDI) with spacer when the following criteria are met:  a. Alert and cooperative     b. HR < 140 bpm  c. RR < 30 bpm                d. Can demonstrate a 2-3 second inspiratory hold  4.  Bronchodilators will be administered via Hand Held Nebulizer [facility]) to patients when ANY of the following criteria are met  a. Incognizant or uncooperative          b. Patients treated with HHN at Home        c. Unable to demonstrate proper use of MDI with spacer     d. RR > 30 bpm   5. Bronchodilators will be delivered via Metered Dose Inhaler (MDI), HHN, Aerogen to intubated patients on mechanical ventilation. 6. Inhalation medication orders will be delivered and/or substituted as outlined below. Aerosolized Medications Ordering and Administration Guidelines:    1. All Medications will be ordered by a physician, and their frequency and/or modality will be adjusted as defined by the patients Respiratory Severity Index (RSI) score. 2. If the patient does not have documented COPD, consider discontinuing anticholinergics when RSI is less than 9.  3. If the bronchospasm worsens (increased RSI), then the bronchodilator frequency can be increased to a maximum of every 4 hours. If greater than every 4 hours is required, the physician will be contacted. 4. If the bronchospasm improves, the frequency of the bronchodilator can be decreased, based on the patient's RSI, but not less than home treatment regimen frequency. 5. Bronchodilator(s) will be discontinued if patient has a RSI less than 9 and has received no scheduled or as needed treatment for 72  Hrs. Patients Ordered on a Mucolytic Agent:    1. Must always be administered with a bronchodilator. 2. Discontinue if patient experiences worsened bronchospasm, or secretions have lessened to the point that the patient is able to clear them with a cough. Anti-inflammatory and Combination Medications:    1. If the patient lacks prior history of lung disease, is not using inhaled anti-inflammatory medication at home, and lacks wheezing by examination or by history for at least 24 hours, contact physician for possible discontinuation.

## 2019-06-14 NOTE — PROGRESS NOTES
Shift assessment completed. Patient awake,alert and oriented. Denies any discomfort at this time. Vitals obtained (see flow sheet), Patient on high flow 7L/min 02 97%. Evening medications given per [facility] order. , lantus and sliding scale given per Order. Patient up to bedside to use urinal tolerated well, denied SOB when standing. Patient denies any needs at this time, daughter at bedside,will continue to monitor,call light within reach.

## 2019-06-14 NOTE — PROGRESS NOTES
Latic acid 3.8  Notified MD Placed order to give 1000ml blous and start fluids at 100ml/hr. Redraw lab at 0100 was 3.2. MD said to notify if level increases. Will continue to monitor.

## 2019-06-14 NOTE — CONSULTS
Patient is being seen at the request of Allison Membreno for a consultation for acute hypoxemic respiratory failure    HISTORY OF PRESENT ILLNESS: This is a 54-year-old white male with a history of COPD, MARIA ELENA and atrial fibrillation, who presented to the emergency department on 6/13/19 with a one day history of increasingly worsening mental status, associated with gray/ashen color. He was not using his nebulizer because he was worried about increasing his heart rate. His family/health aid checked his saturations and he was 43% at home. He did not respond to his usual 4 L supplemental oxygen. EMS was called for these symptoms and found the patient to be saturating in the 40% range and the patient was placed on nonrebreather mask with improvement in his oxygenation but only into the 70s. In the emergency department he was placed on BiPAP with marked improvement in his saturations. He has had similar episodes in the past.  Family in the room tells me that he is commonly in the 70-80% range with exertion and he does not reliably use oxygen. He also has frequent episodes where his sats are low at rest.  Other times, he saturates in the 90% range on room air. He reliably uses oxygen at night with his CPAP. PAST MEDICAL HISTORY:  Past Medical History:   Diagnosis Date    Asthma     Atrial fibrillation (HCC)     [facility]     Diabetes mellitus ([facility])     Obstructive sleep apnea     Paroxysmal atrial fibrillation ([facility] [address]      PAST SURGICAL HISTORY:  Past Surgical History:   Procedure Laterality Date    CARPAL TUNNEL RELEASE      KNEE ARTHROSCOPY      NECK SURGERY         FAMILY HISTORY:  No early lung disease    SOCIAL HISTORY:   reports that he has been smoking cigarettes. He has a 50.00 pack-year smoking history.  He has never used smokeless tobacco.    Scheduled Meds:   insulin glargine  30 Units Subcutaneous BID    metoprolol tartrate  25 mg Oral BID    gabapentin  600 mg Oral BID    mupirocin   Nasal BID    apixaban  5 mg Oral BID    diltiazem  240 mg Oral Daily    finasteride  5 mg Oral Daily    furosemide  40 mg Oral Daily    tamsulosin  0.4 mg Oral Daily    sodium chloride flush  10 mL Intravenous 2 times per day    methylPREDNISolone  40 mg Intravenous Q6H    Followed by   Lilibeth Roe ON 6/16/2019] predniSONE  40 mg Oral Daily    cefTRIAXone (ROCEPHIN) IV  1 g Intravenous Q24H    azithromycin  500 mg Intravenous Q24H    potassium chloride  10 mEq Oral Daily    ipratropium-albuterol  1 ampule Inhalation Q4H    insulin lispro  0-18 Units Subcutaneous TID WC    insulin lispro  0-9 Units Subcutaneous Nightly     Continuous Infusions:   dextrose      sodium chloride 100 mL/hr at 06/13/19 2222     PRN Meds:  glucose, dextrose, glucagon (rDNA), dextrose, sodium chloride flush, magnesium hydroxide, ondansetron, albuterol, acetaminophen    ALLERGIES:  Patient has No Known Allergies. REVIEW OF SYSTEMS: Diffusely negative, however the patient is mildly agitated about being in the hospital and insisted that he is fine in need to just go home  Constitutional: Negative for fever  HENT: Negative for sore throat  Eyes: Negative for redness   Respiratory: Denies dyspnea, cough  Cardiovascular: Negative for chest pain  Gastrointestinal: Negative for vomiting, diarrhea   Genitourinary: Negative for hematuria   Musculoskeletal: Negative for arthralgias   Skin: Negative for rash  Neurological: Negative for syncope  Hematological: Negative for adenopathy  Psychiatric/Behavorial: Negative for anxiety    PHYSICAL EXAM:  Blood pressure 115/87, pulse 95, temperature 97.9 °F (36.6 °C), temperature source Oral, resp. rate 15, height 6' (1.829 m), weight 296 lb 12.8 oz (134.6 kg), SpO2 96 %.' on 5 L  General: NAD. Eyes: PERRL. No sclera icterus. No conjunctival injection. ENT: No discharge. Pharynx clear. Neck: Trachea midline. Normal thyroid.   Resp: No accessory muscle use. No crackles. No wheezing. No rhonchi. No dullness on percussion. CV: Regular rate. Regular rhythm. No mumur or rub. ++ edema. Peripheral pulses are 2+. Capillary refill is less than 3 seconds. GI: Non-tender. Non-distended. No masses. No organomegaly. Normal bowel sounds. No hernia. Skin: Warm and dry. No nodule on exposed extremities. No rash on exposed extremities. Lymph: No cervical LAD. No supraclavicular LAD. M/S: No cyanosis. No joint deformity. No clubbing. Neuro: A&OX3. Patellar reflexes are symmetric. Psych: Mild agitation, no anxiety, affect is full. LABS:  CBC:   Recent Labs     06/13/19  1250 06/14/19  0449   WBC 7.0 7.0   HGB 14.5 14.2   HCT 43.9 43.0   MCV 97.3 99.0    116*     BMP:   Recent Labs     06/13/19  1250 06/14/19  0449   * 134*   K 5.3* 4.5   CL 96* 97*   CO2 24 21   BUN 13 21*   CREATININE 1.1 1.1     LIVER PROFILE:   Recent Labs     06/13/19  1250   AST 42*   ALT 35   BILITOT 0.6   ALKPHOS 102     PT/INR:   Recent Labs     06/13/19  1250   PROTIME 14.0*   INR 1.23*     APTT:   Recent Labs     06/13/19  1250   APTT 34.0     UA:No results for input(s): NITRITE, COLORU, PHUR, LABCAST, WBCUA, RBCUA, MUCUS, TRICHOMONAS, YEAST, BACTERIA, CLARITYU, SPECGRAV, LEUKOCYTESUR, UROBILINOGEN, BILIRUBINUR, BLOODU, GLUCOSEU, AMORPHOUS in the last 72 hours.     Invalid input(s): Deshaun Ford  Recent Labs     06/13/19  1314   PHART 7.404   [id number] 43.1   PO2ART 52.7*       Cultures:      6/13/19 blood sent  6/13/19 sputum requested    Chest imaging was reviewed by me and showed:   6/14/19 CXR changes of COPD without focal consolidation    CARDS:  2/7/19 nuclear stress no ischemia, normal EF  2/7/19 Echo atrial fibrillation, EF > 65%, LVH, signs of RV overload    ASSESSMENT:  ·  Acute on chronic hypoxemic respiratory failure, typically wears oxygen at night only  · Acute on chronic cor pulmonale, almost certainly worsened with severe hypoxemia, appears to be better

## 2019-06-14 NOTE — H&P
Hospital Medicine History & Physical      PCP: Darius Ulloa MD    Date of Service: Pt seen/examined on 6/13/19 and admitted on 6/13/19 to Inpatient    Chief Complaint   Patient presents with    Respiratory Distress     Daughter noticed pt was a little gray last night, noticed today pale in color oxygen 48% EMS called. EMS placed on NRB oxygen in the 70s%. Pt wears oxygen at night 3 liters       History Of Present Illness: The patient is a 71 y.o. male with PMH below, presents with SOB/HARMON, hypoxia, pallor, resp distress, cough. Family summoned EMS bc of the above sx. They found him to be satting in the 40's. He is normally on 3-4L at home at night and PRN during the day. He has a hx of a fib, COPD, MARIA ELENA. He wears CPAP at night. He reports he has been increasingly SOB over the last few days. His daughter reports he has not been using his nebs very much at home bc he is concerned it will make him go into a fib. Denies CP, abd pain. He has had increasing Oxygen demand since arrival.      Past Medical History:        Diagnosis Date    Asthma     Atrial fibrillation (HCC)     COPD (chronic obstructive pulmonary disease) (HCC)     Diabetes mellitus ([facility] [address]     Obstructive sleep apnea     Paroxysmal atrial fibrillation (HCC)        Past Surgical History:        Procedure Laterality Date    CARPAL TUNNEL RELEASE      KNEE ARTHROSCOPY      NECK SURGERY         Medications Prior to Admission:    Prior to Admission medications    Medication Sig Start Date End Date Taking?  Authorizing Provider   melatonin 3 MG TABS tablet Take 5-10 mg by mouth nightly as needed   Yes Historical Provider, MD   mirtazapine (REMERON) 15 MG tablet Take 7.5 mg by mouth nightly   Yes Historical Provider, MD   tamsulosin (FLOMAX) 0.4 MG capsule Take 0.4-0.8 mg by mouth daily    Yes Historical Provider, MD   apixaban (ELIQUIS) 5 MG TABS tablet Take by mouth 2 times daily   Yes Historical Provider, MD   furosemide (LASIX) 20 MG tablet Take 40 mg by mouth daily    Yes Historical Provider, MD   gabapentin (NEURONTIN) 300 MG capsule Take 600 mg by mouth 2 times daily. Yes Historical Provider, MD   glipiZIDE (GLUCOTROL) 5 MG tablet Take 5 mg by mouth daily    Yes Historical Provider, MD   metFORMIN (GLUCOPHAGE) 500 MG tablet Take 1,000 mg by mouth 2 times daily (with meals)    Yes Historical Provider, MD   potassium chloride (KLOR-CON M) 10 MEQ extended release tablet Take 10 mEq by mouth daily   Yes Historical Provider, MD   metoprolol tartrate (LOPRESSOR) 50 MG tablet Take 1 tablet by mouth 2 times daily 6/9/17  Yes Nicholas Parks MD   diltiazem (CARDIZEM CD) 240 MG extended release capsule Take 1 capsule by mouth daily 6/9/17  Yes Nicholas Parks MD   bicalutamide (CASODEX) 50 MG chemo tablet Take 50 mg by mouth daily    Historical Provider, MD Christiana Hillman KWIKPEN 100 UNIT/ML injection pen Inject 10-40 Units into the skin nightly  3/4/19   Historical Provider, MD   Dulaglutide (TRULICITY SC) Inject 0.5 mLs into the skin once a week     Historical Provider, MD   Leuprolide Acetate, 3 Month, (ELIGARD) 22.5 MG KIT Inject 22.5 mg into the skin daily     Historical Provider, MD       Allergies:  Patient has no known allergies. Social History:    TOBACCO:   reports that he has been smoking cigarettes. He has a 50.00 pack-year smoking history. He has never used smokeless tobacco.  ETOH:   reports that he does not drink alcohol. Family History:  Reviewed in detail and negative for DM, Early CAD, Cancer (except as below). Positive as follows:    History reviewed. No pertinent family history.     REVIEW OF SYSTEMS:   Pertinent positives/negatives as follows: SOB/HARMON, hypoxia, pallor, resp distress, cough, and as discussed in HPI, otherwise a complete ROS performed and all other systems are negative  PHYSICAL EXAM PERFORMED:    /64   Pulse 86   Temp 98.3 °F (36.8 °C) (Oral)   Resp 15   Ht 6' (1.829 m)   Wt 300 lb (136.1 kg)   SpO2 95%   BMI 40.69 kg/m²     GEN:  A&Ox3, mild increased WOB. Morbidly obese. HEENT:  NC/AT,EOMI, semi dry MM, no erythema/exudates or visible masses. CVS:  Normal S1,S2.  ii. LUNG:   Bilat exp wheezes w/o rales or rhonchi.  + accessory mm use. Diminished at bases. ABD:  Soft, ND/NT, BS+ x4. Without G/R.  EXT: No c/c. + edema BLE. Brisk cap refill. PSY:  Thought process intact, affect appropriate. PIPE:  CN III-XII intact, moves all 4 spontaneously, sensory grossly intact. SKIN: No rash or lesions on visible skin. Chart review shows recent radiographs:  Xr Chest Standard (2 Vw)    Result Date: 6/13/2019  EXAMINATION: TWO XRAY VIEWS OF THE CHEST 6/13/2019 12:49 pm COMPARISON: 12/20/2018 HISTORY: ORDERING SYSTEM PROVIDED HISTORY: sob TECHNOLOGIST PROVIDED HISTORY: Reason for exam:->sob Ordering Physician Provided Reason for Exam: SOB Acuity: Acute Type of Exam: Initial FINDINGS: Frontal and lateral views of the chest are submitted for review. The cardiac silhouette is borderline prominent and there is mild generalized interstitial prominence. Lung parenchyma is clear without focal airspace consolidation, sizeable pleural effusion, or pneumothorax. Trachea is midline. Osseous structures and soft tissues are grossly intact. Borderline cardiomegaly. No focal airspace consolidation. Generalized interstitial prominence suggestive of underlying COPD.      EKG 12 Lead [[id number]] Collected: 06/13/19 1241   Updated: 06/13/19 1640     Ventricular Rate 116 BPM    Atrial Rate 116 BPM    P-R Interval 226 ms    QRS Duration 108 ms    Q-T Interval 340 ms    QTc Calculation (Bazett) 472 ms    P Axis 62 degrees    R Axis 197 degrees    T Axis 62 degrees    Diagnosis Sinus tachycardia with 1st degree A-V block with Premature ventricular complexes or Fusion complexesPulmonary disease patternRight bundle branch blockSeptal infarct , age undetermined Low voltage QRSAbnormal ECGWhen compared with ECG of12.1.18 sinus rhythm is now present. Confirmed by Meliza Chiu MD, [address] ([id number]) on 6/13/2019 4:40:40 PM       CBC:  Recent Labs     06/13/19  1250   WBC 7.0   HGB 14.5   HCT 43.9         RENAL  Recent Labs     06/13/19  1250   *   K 5.3*   CL 96*   CO2 24   BUN 13   CREATININE 1.1   GLUCOSE 289*     Hemoglobin a1c:  Lab Results   Component Value Date    LABA1C 5.7 06/06/2017    LABA1C 9.7 (H) 03/14/2011     LFT'S:  Recent Labs     06/13/19  1250   AST 42*   ALT 35   BILITOT 0.6   ALKPHOS 102     COAG:  Recent Labs     06/13/19  1250   INR 1.23*     CARDIAC ENZYMES:   Recent Labs     06/13/19  1250   TROPONINI <0.01     Lab Results   Component Value Date    PROBNP 484 (H) 06/13/2019    PROBNP 2,956 (H) 06/05/2017     LACTIC ACID:  Recent Labs     06/13/19  1311 06/13/19  1855   LACTSEPSIS 2.6* 3.8*     ABG:   Recent Labs     06/13/19  1314   PHART 7.404   [id number] 43.1   PO2ART 52.7*   EWN5BDT 26.3   [id number] 87.3*   SBV5BGS 27.7   BEART 1.3       PHYSICIAN CERTIFICATION    I certify that Jigna Maldonado is expected to be hospitalized for 2 midnights based on the following assessment and plan:    ASSESSMENT/PLAN:  1. Severe sepsis, likely related to #3, ABX as below, f/u cx. No need for pressors at this time. 2. Acute on chronic respiratory failure with hypoxia, likely related to CAP +/- aeCOPD, supplemental O2 to maintain SPO2 ? 92%, continuous pulse ox. Wean as tolerated to home O2 of 3-4L ON and PRN during the day. Increasing O2 demand since arrival, admit to ICU. He has required as much as 15L via NRB. Pulm c/s. 3. CAP w/ probable aeCOPD, IV solumedrol, IV ceftriaxone and azithro, PRN/IRAIS intensive NEB therapy. Check respiratory culture and procalcitonin. Hold home regimen for now. 4. Lactic acidosis, 2.6, 3.8, IVF and additional repeats ordered. 5. Hx a fib, currently slipped into a fib w/ controlled rate. Was sinus earlier this evening.    Cont home Cardizem, Eliquis and rest of home regimen . 6. DM2, poorly controlled, worsening likely related to steroids. Chk A1c, add High SSI, continue home BID Lantus dose. 7. HTN, controlled, cont home regimen. DVT Prophylaxis: Eliquis  Diet: carb control  Code Status: Full Code   PT/OT Eval Status: Will order if needed and as patient condition allows  Dispo - Admit to inpatient ICU    Due to the immediate potential for life-threatening deterioration due to acute on chronic respiratory failure with hypoxia, increasing O2 demand, I spent 34 minutes providing critical care. This time is excluding time spent performing procedures. Zeyad Carr MD    Thank you Martínez Mccrary MD for the opportunity to be involved in this patient's care. If you have any questions or concerns please feel free to contact me via the [employer] at [phone]. This chart was generated using the [address] [place] [address] [address] dictation system. I created this record but it may contain dictation errors given the limitations of this technology.

## 2019-06-15 VITALS
OXYGEN SATURATION: 94 % | DIASTOLIC BLOOD PRESSURE: 71 MMHG | HEART RATE: 113 BPM | HEIGHT: 72 IN | WEIGHT: 296.8 LBS | RESPIRATION RATE: 18 BRPM | TEMPERATURE: 97.3 F | BODY MASS INDEX: 40.2 KG/M2 | SYSTOLIC BLOOD PRESSURE: 123 MMHG

## 2019-06-15 LAB
GLUCOSE BLD-MCNC: 280 MG/DL (ref 70–99)
GLUCOSE BLD-MCNC: 392 MG/DL (ref 70–99)
GLUCOSE BLD-MCNC: 451 MG/DL (ref 70–99)
PERFORMED ON: ABNORMAL

## 2019-06-15 PROCEDURE — 6370000000 HC RX 637 (ALT 250 FOR IP): Performed by: INTERNAL MEDICINE

## 2019-06-15 PROCEDURE — 97116 GAIT TRAINING THERAPY: CPT

## 2019-06-15 PROCEDURE — 99232 SBSQ HOSP IP/OBS MODERATE 35: CPT | Performed by: INTERNAL MEDICINE

## 2019-06-15 PROCEDURE — 2700000000 HC OXYGEN THERAPY PER DAY

## 2019-06-15 PROCEDURE — 97530 THERAPEUTIC ACTIVITIES: CPT

## 2019-06-15 PROCEDURE — 99239 HOSP IP/OBS DSCHRG MGMT >30: CPT | Performed by: INTERNAL MEDICINE

## 2019-06-15 PROCEDURE — 94761 N-INVAS EAR/PLS OXIMETRY MLT: CPT

## 2019-06-15 PROCEDURE — 94640 AIRWAY INHALATION TREATMENT: CPT

## 2019-06-15 PROCEDURE — 2580000003 HC RX 258: Performed by: INTERNAL MEDICINE

## 2019-06-15 RX ORDER — FINASTERIDE 5 MG/1
5 TABLET, FILM COATED ORAL DAILY
Qty: 1 TABLET | Refills: 0
Start: 2019-06-15 | End: 2021-10-16

## 2019-06-15 RX ORDER — PREDNISONE 10 MG/1
TABLET ORAL
Qty: 9 TABLET | Refills: 0 | Status: ON HOLD | OUTPATIENT
Start: 2019-06-15 | End: 2019-07-30 | Stop reason: HOSPADM

## 2019-06-15 RX ORDER — AZITHROMYCIN 250 MG/1
250 TABLET, FILM COATED ORAL DAILY
Qty: 3 TABLET | Refills: 0 | Status: SHIPPED | OUTPATIENT
Start: 2019-06-16 | End: 2019-06-19

## 2019-06-15 RX ADMIN — GABAPENTIN 600 MG: 300 CAPSULE ORAL at 09:14

## 2019-06-15 RX ADMIN — POTASSIUM CHLORIDE 10 MEQ: 10 TABLET, EXTENDED RELEASE ORAL at 09:14

## 2019-06-15 RX ADMIN — DILTIAZEM HYDROCHLORIDE 240 MG: 240 CAPSULE, COATED, EXTENDED RELEASE ORAL at 09:14

## 2019-06-15 RX ADMIN — IPRATROPIUM BROMIDE AND ALBUTEROL SULFATE 1 AMPULE: .5; 3 SOLUTION RESPIRATORY (INHALATION) at 07:26

## 2019-06-15 RX ADMIN — IPRATROPIUM BROMIDE AND ALBUTEROL SULFATE 1 AMPULE: .5; 3 SOLUTION RESPIRATORY (INHALATION) at 11:19

## 2019-06-15 RX ADMIN — AZITHROMYCIN MONOHYDRATE 250 MG: 250 TABLET ORAL at 09:14

## 2019-06-15 RX ADMIN — FUROSEMIDE 40 MG: 40 TABLET ORAL at 09:14

## 2019-06-15 RX ADMIN — FINASTERIDE 5 MG: 5 TABLET, FILM COATED ORAL at 09:14

## 2019-06-15 RX ADMIN — METOPROLOL TARTRATE 25 MG: 25 TABLET ORAL at 09:14

## 2019-06-15 RX ADMIN — PREDNISONE 30 MG: 20 TABLET ORAL at 09:14

## 2019-06-15 RX ADMIN — MUPIROCIN: 20 OINTMENT TOPICAL at 09:15

## 2019-06-15 RX ADMIN — Medication 10 ML: at 09:15

## 2019-06-15 RX ADMIN — INSULIN LISPRO 9 UNITS: 100 INJECTION, SOLUTION INTRAVENOUS; SUBCUTANEOUS at 11:54

## 2019-06-15 RX ADMIN — INSULIN GLARGINE 45 UNITS: 100 INJECTION, SOLUTION SUBCUTANEOUS at 09:16

## 2019-06-15 RX ADMIN — TAMSULOSIN HYDROCHLORIDE 0.4 MG: 0.4 CAPSULE ORAL at 09:14

## 2019-06-15 RX ADMIN — APIXABAN 5 MG: 5 TABLET, FILM COATED ORAL at 09:14

## 2019-06-15 RX ADMIN — INSULIN LISPRO 15 UNITS: 100 INJECTION, SOLUTION INTRAVENOUS; SUBCUTANEOUS at 07:47

## 2019-06-15 NOTE — CARE COORDINATION
DISCHARGE ORDER  Date/Time 6/15/2019 11:40 AM  Completed by: Moshe Zamora, Case Management    Patient Name: Massiel Gonzalez. : 1955  Admitting Diagnosis: Acute respiratory failure with hypoxia ([facility] [address] [J96.01]  Admit Date/Time: 2019 12:30 PM    Noted discharge order. Confirmed discharge plan with patient: Yes   Discharge Plan:   Received discharge order. Spoke with patient and he is agreeable to going home today. He denies any home health needs.
Pt plans to return home at discharge. Denies need for hhc or other services. Will cont to follow. Explained Case Management role/services.

## 2019-06-15 NOTE — PROGRESS NOTES
Progress Note    Admit Date:  6/13/2019    He feels better this am  Wants to go home       Objective:   /71   Pulse 113   Temp 97.3 °F (36.3 °C) (Oral)   Resp 18   Ht 6' (1.829 m)   Wt 296 lb 12.8 oz (134.6 kg)   SpO2 96%   BMI 40.25 kg/m²       Intake/Output Summary (Last 24 hours) at 6/15/2019 1009  Last data filed at 6/15/2019 0800  Gross per 24 hour   Intake 720 ml   Output 4150 ml   Net -3430 ml         Physical Exam:  General:  Awake, alert, NAD  Obese  Skin:  Warm and dry  Neck:  JVD absent. Neck supple  Chest: Diminished breath sounds radiology. No wheezes, rales or rhonchi. Cardiovascular:  RRR ,S1S2 normal  Abdomen:  Soft, non tender, non distended, BS +  Extremities:  No edema. Intact peripheral pulses. Brisk cap refill, < 2 secs  Neuro: non focal      Medications:   Scheduled Meds:   metoprolol tartrate  25 mg Oral BID    gabapentin  600 mg Oral BID    mupirocin   Nasal BID    insulin glargine  45 Units Subcutaneous BID    azithromycin  250 mg Oral Daily    predniSONE  30 mg Oral Daily    ipratropium-albuterol  1 ampule Inhalation Q4H WA    apixaban  5 mg Oral BID    diltiazem  240 mg Oral Daily    finasteride  5 mg Oral Daily    furosemide  40 mg Oral Daily    tamsulosin  0.4 mg Oral Daily    sodium chloride flush  10 mL Intravenous 2 times per day    potassium chloride  10 mEq Oral Daily    insulin lispro  0-18 Units Subcutaneous TID WC    insulin lispro  0-9 Units Subcutaneous Nightly       Continuous Infusions:   dextrose         Data:  CBC:   Recent Labs     06/13/19  1250 06/14/19  0449   WBC 7.0 7.0   RBC 4.51 4.34   HGB 14.5 14.2   HCT 43.9 43.0   MCV 97.3 99.0   RDW 15.0 14.9    116*     BMP:   Recent Labs     06/13/19  1250 06/14/19  0449   * 134*   K 5.3* 4.5   CL 96* 97*   CO2 24 21   BUN 13 21*   CREATININE 1.1 1.1     BNP: No results for input(s): BNP in the last 72 hours.   PT/INR:   Recent Labs     06/13/19  1250   PROTIME 14.0*   INR 1.23* APTT:   Recent Labs     06/13/19  1250   APTT 34.0     CARDIAC ENZYMES:   Recent Labs     06/13/19  1250   TROPONINI <0.01     FASTING LIPID PANEL:  Lab Results   Component Value Date    CHOL 168 01/01/2019    HDL 29 (L) 01/01/2019    TRIG 309 (H) 01/01/2019     LIVER PROFILE:   Recent Labs     06/13/19  1250   AST 42*   ALT 35   BILITOT 0.6   ALKPHOS 102        Radiology  XR CHEST STANDARD (2 VW)   Final Result   Borderline cardiomegaly. No focal airspace consolidation. Generalized interstitial prominence suggestive of underlying COPD. Assessment:  Active Problems:    Acute respiratory failure with hypoxia (HCC)    Acute cor pulmonale (HCC)    COPD with acute exacerbation (HCC)    Acute on chronic respiratory failure with hypoxemia (HCC)    Atrial fibrillation, controlled ([facility] [address]  Resolved Problems:    * No resolved hospital problems. *      Plan:    Acute on chronic respiratory failure with hypoxia  Secondary to COPD exacerbation .   -No clear source of infection . No white count , no fever , chest x-ray without infiltrates , normal procalcitonin . Sepsis ruled out   -Lactic acidosis likely from nebulizer treatment . DC IV fluids  -Required high flow oxygen 8 L on presentation , currently down to 4 L  . Wean O2 as tolerated . he is normally on home oxygen 3 L and CPAP at night  .   -Seen by pulmonary . Continue nebulizer treatments   -Status post IV steroids switch to oral prednisone  . Continue empiric antibiotics Zithromax . Hx a fib  -Rate controlled . Cont home Cardizem, Eliquis .     DM2, poorly controlled  - worsening likely related to steroids  - Chk A1c, increase Lantus dosing , on High SSI    HTN, controlled  - cont home regimen.        DVT Prophylaxis: Eliquis  Diet: carb control  Code Status: Full Code     D/c home       Jayla Salazar MD 6/15/2019 10:09 AM

## 2019-06-15 NOTE — PLAN OF CARE
Problem: Infection:  Goal: Will remain free from infection  Description  Will remain free from infection  Outcome: Ongoing     Problem: Safety:  Goal: Free from accidental physical injury  Description  Free from accidental physical injury  Outcome: Ongoing  Goal: Free from intentional harm  Description  Free from intentional harm  Outcome: Ongoing     Problem: Daily Care:  Goal: Daily care needs are met  Description  Daily care needs are met  Outcome: Ongoing     Problem: SAFETY  Goal: Free from accidental physical injury  Outcome: Ongoing  Goal: Free from intentional harm  Outcome: Ongoing     Problem: Discharge Planning:  Goal: Discharged to appropriate level of care  Description  Discharged to appropriate level of care  Outcome: Ongoing     Problem:  Activity Intolerance:  Goal: Ability to tolerate increased activity will improve  Description  Ability to tolerate increased activity will improve  Outcome: Ongoing     Problem: Airway Clearance - Ineffective:  Goal: Ability to maintain a clear airway will improve  Description  Ability to maintain a clear airway will improve  Outcome: Ongoing     Problem: Breathing Pattern - Ineffective:  Goal: Ability to achieve and maintain a regular respiratory rate will improve  Description  Ability to achieve and maintain a regular respiratory rate will improve  Outcome: Ongoing     Problem: Gas Exchange - Impaired:  Goal: Levels of oxygenation will improve  Description  Levels of oxygenation will improve  Outcome: Ongoing

## 2019-06-15 NOTE — PROGRESS NOTES
Pulmonary Progress Note  CC: hypoxemia    Subjective:  No shortness of breath     IV line peripheral    EXAM:   /71   Pulse 113   Temp 97.3 °F (36.3 °C) (Oral)   Resp 18   Ht 6' (1.829 m)   Wt 296 lb 12.8 oz (134.6 kg)   SpO2 94%   BMI 40.25 kg/m²  on 4 L  Constitutional:  No acute distress   HEENT: no scleral icterus  Neck: No tracheal deviation present. Cardiovascular: Normal heart sounds. Pulmonary/Chest: few wheezes. No rhonchi. No rales. No decreased breath sounds. No accessory muscle usage or stridor. Abdominal: Soft. Musculoskeletal: No cyanosis. No clubbing. Skin: Skin is warm and dry.      Scheduled Meds:   metoprolol tartrate  25 mg Oral BID    gabapentin  600 mg Oral BID    mupirocin   Nasal BID    insulin glargine  45 Units Subcutaneous BID    azithromycin  250 mg Oral Daily    predniSONE  30 mg Oral Daily    ipratropium-albuterol  1 ampule Inhalation Q4H WA    apixaban  5 mg Oral BID    diltiazem  240 mg Oral Daily    finasteride  5 mg Oral Daily    furosemide  40 mg Oral Daily    tamsulosin  0.4 mg Oral Daily    sodium chloride flush  10 mL Intravenous 2 times per day    potassium chloride  10 mEq Oral Daily    insulin lispro  0-18 Units Subcutaneous TID WC    insulin lispro  0-9 Units Subcutaneous Nightly     Continuous Infusions:   dextrose       PRN Meds:  glucose, dextrose, glucagon (rDNA), dextrose, sodium chloride flush, magnesium hydroxide, ondansetron, albuterol, acetaminophen    Labs:  CBC:   Recent Labs     06/13/19  1250 06/14/19  0449   WBC 7.0 7.0   HGB 14.5 14.2   HCT 43.9 43.0   MCV 97.3 99.0    116*     BMP:   Recent Labs     06/13/19  1250 06/14/19  0449   * 134*   K 5.3* 4.5   CL 96* 97*   CO2 24 21   BUN 13 21*   CREATININE 1.1 1.1     Cultures:      6/13/19 blood sent  6/13/19 sputum requested    6/14/19 CXR changes of COPD without focal consolidation    CARDS:  2/7/19 nuclear stress no ischemia, normal EF  2/7/19 Echo atrial

## 2019-06-15 NOTE — PROGRESS NOTES
Pt's sat dropped down into the mid eighty's when pt was up the bathroom. After a few minutes on 4 LNC his sat would recovery. However when he went to sleep with the Cpap on, he dropped many times into the 80's then the 70's. Resp therapy was called & He place the O2 at [facility] with the Cpap just like that pt uses at home. Pt still dropping into the 70's when sleeping. Resp therapy called many times regarding this. He finally placed him on 10L of O2 with the Cpap. This kept his O2 > 89%. Presently his sat 93% on the cpap & O2. MD, clinical, and charge aware of pt condition. Pt becomes very SOB with any exertion. Pt was given a urinal to help less trips to the bathroom. Pt resting in bed and denies needing anything at this time.

## 2019-06-15 NOTE — PROGRESS NOTES
Inpatient Physical Therapy Daily Treatment Note and Discharge Summary    Unit: [address]  Date:  6/15/2019  Patient Name:    Roderick Kurtz. \"Al\"  Admitting diagnosis:  Acute respiratory failure with hypoxia ([facility] [address] [J96.01]  Admit Date:  6/13/2019  Precautions/Restrictions/WB Status/ Lines/ Wounds/ Oxygen: fall risk, IV and supplemental o2 (4L)    Treatment Time:  903-507  Treatment Number:  2  Timed Code Treatment Minutes: 40 minutes  Total Treatment Minutes:  40 minutes    Patient Goals for Therapy: \" to go home \"          Discharge Recommendations: Home with PRN assist  DME needs for discharge: Needs Met     Reports possibly needing to call the [facility] for a bigger concentrator for higher O2 demands       Therapy recommendations for staff: Independent with use of No AD for all transfers or ambulation within room. Stand by assist in halls for oxygen tank. Nursing aware. Home Health S4 Level Recommendation:  NA  AM-PAC Mobility Score    AM-PAC Inpatient Mobility Raw Score : 24     Preadmission Environment    Pt. Lives With Family; pt's spouse passed away about a year and a half ago, and reports being diagnosed with prostate cancer the next day (completed 45 days of radiation, finished approx. 1 month ago)   Home environment:  split level home  Steps to enter first floor: 2 Steps to enter and One Hand rail  Steps to second floor: Partial flight and One Hand Rail  Bathroom: Bath [address] [facility]  Equipment owned: [address] [place] [address] Blvd, Rolling Walker, Rollator , Shower Chair, BSC, home O2 (3-4 L) PRN, pulse ox and inhaler, nebulizer    Preadmission Status:  Pt. Able to drive: Yes  Pt Fully independent with ADLs: Yes; IND with yard work  Pt. Required assistance from family for: Cleaning, Cooking and Laundry   Pt. Fully independent for transfers and gait and walked with No Device  History of falls No   Reports he did not have his O2 on when he desaturated just prior to this admission.      Pain   No  Location:   Rating: NA /10  Pain Medicine Status: Denies need    Cognition    A&O x4   Able to follow 2 step commands    Subjective  Patient lying supine in bed with no family present and family at bedside  Pt agreeable to getting up and ambulating. A little frustrated about not being about to walk to bathroom. Had lower sats yesterday with ambulation. Wanting to go home. Balance  Static Sitting:  Good    Tolerance: WNL  Dynamic Sitting:  Good   Static Standing: Good    Tolerance: WFL  Dynamic Standing: Good     Bed Mobility   Supine to Sit:   Independent  Sit to Supine:  Independent  Rolling:   Independent  Scooting at EOB: Independent  Scooting to [facility]:  Independent    Transfer Training     Sit to stand:   Independent  Stand to sit: Independent  Bed to Chair:  Not Tested with use of N/A    Gait  Patient ambulated 4 minutes, 325 feet, no AD, wide based gait, but otherwise normal gait, steady, no LOB, on 4 L oxygen (SBA for tank). Ambulated an additional 75 feet back to his room. Patient ambulated up/down 9 steps with rails SBA, steady, no LOB. Patient ambulated in/out of bathroom managing oxygen line safely 4 L (added clear line to green tubing as per respiratory). Patient Activity Tolerance   Pt completed therapy session with No adverse symptoms  SPO2 95,  at rest on 4 L oxygen. SPO2 86-89, HR ranged 110s to 130s with ambulation (patient has afib)  SPO2 down to 85,  with steps. Seated rest breaks between bout of ambulation and steps with recovery within 30-60 seconds. Positioning Needs   Pt sitting up in chair, call light and needs in reach    Exercises Initiated    N/A    Other  None. Patient/Family Education   Pt educated on role of inpatient PT, POC, given the OK to ambulate to bathroom on his own, but with standby assist of staff in halls for oxygen tank. Assessment  Pt has made great progress, tolerating ambulation in halls and steps with sats in mid to upper 80s.   Added adequate oxygen tubing as per respiratory Marlene Kingdom) to allow patient to ambulate himself to/from the bathroom. No further PT needs. Goals met for mobility. Patient seen for 2 visits. Therex deferred with patient being independent. Goals : To be met in 3 visits:  1). Independent with LE Ex x 10 reps    To be met in 6 visits:  1). Supine to/from sit: Independent  2). Gait: Ambulate 150 ft   with  Supervision  and use of No AD  3). Tolerate B LE exercises 3 sets of 10-15 reps  4). Ascend/descend 7 steps with SBA with use of One Hand rail and No AD. Plan    D/c from PT due to independent in room, standby assist in halls. Nursing aware. Joana Clemente, PT  #[id number]    If patient discharges from this facility prior to next visit, this note will serve as the Discharge Summary.

## 2019-06-17 ENCOUNTER — TELEPHONE (OUTPATIENT)
Dept: PULMONOLOGY | Age: 64
End: 2019-06-17

## 2019-06-17 NOTE — DISCHARGE SUMMARY
Name:  Evelia Vora  Room:  0213/0213-02  MRN:    [id number]    Discharge Summary      This discharge summary is in conjunction with a complete physical exam done on the day of discharge. Attending Physician: MARINO Edwards. Discharging Physician: MARINO Edwards. Admit: 6/13/2019  Discharge:  6/15/2019    Diagnoses this Admission    Active Problems:    Acute on chronic respiratory failure (HCC)    Acute cor pulmonale (HCC)    COPD with acute exacerbation (HCC)    Acute on chronic respiratory failure with hypoxemia (HCC)    Atrial fibrillation, controlled ([facility] [address]  Resolved Problems:    * No resolved hospital problems. *          Procedures (Please Review Full Report for Details)      XR CHEST STANDARD (2 VW)   Final Result   Borderline cardiomegaly. No focal airspace consolidation.       Generalized interstitial prominence suggestive of underlying COPD. Consults        HPI:    The patient is a 71 y.o. male with PMH below, presents with SOB/HARMON, hypoxia, pallor, resp distress, cough. Family summoned EMS bc of the above sx. They found him to be satting in the 40's. He is normally on 3-4L at home at night and PRN during the day. He has a hx of a fib, COPD, MARIA ELENA. He wears CPAP at night. He reports he has been increasingly SOB over the last few days. His daughter reports he has not been using his nebs very much at home bc he is concerned it will make him go into a fib. Denies CP, abd pain. He has had increasing Oxygen demand since arrival.          Physical Exam at Discharge:        Hospital Course    Acute on chronic respiratory failure with hypoxia  Secondary to COPD exacerbation .   -No clear source of infection . No white count , no fever , chest x-ray without infiltrates , normal procalcitonin . Sepsis ruled out   -Lactic acidosis likely from nebulizer treatment . DC IV fluids  -Required high flow oxygen 8 L on presentation , currently down to 4 L  .   Wean O2 as tiotropium 2.5 MCG/ACT Aers inhaler  Commonly known as:  Sophy Miguel           Where to Get Your Medications      These medications were sent to [facility] [address], [address], [facility]    Phone:  [phone]   · azithromycin 250 MG tablet  · predniSONE 10 MG tablet     Information about where to get these medications is not yet available    Ask your nurse or doctor about these medications  · finasteride 5 MG tablet           Discharge Condition/Location: Stable    Follow Up: Follow up with PCP.         [facility] NICOLAS 6/17/2019 1:32 PM

## 2019-06-18 LAB
BLOOD CULTURE, ROUTINE: NORMAL
CULTURE, BLOOD 2: NORMAL

## 2019-07-26 ENCOUNTER — APPOINTMENT (OUTPATIENT)
Dept: GENERAL RADIOLOGY | Age: 64
DRG: 189 | End: 2019-07-26
Payer: MEDICARE

## 2019-07-26 ENCOUNTER — APPOINTMENT (OUTPATIENT)
Dept: CT IMAGING | Age: 64
DRG: 189 | End: 2019-07-26
Payer: MEDICARE

## 2019-07-26 ENCOUNTER — HOSPITAL ENCOUNTER (INPATIENT)
Age: 64
LOS: 4 days | Discharge: HOME | DRG: 189 | End: 2019-07-30
Attending: EMERGENCY MEDICINE | Admitting: INTERNAL MEDICINE
Payer: MEDICARE

## 2019-07-26 DIAGNOSIS — I48.91 ATRIAL FIBRILLATION WITH RVR (HCC): ICD-10-CM

## 2019-07-26 DIAGNOSIS — J96.20 ACUTE ON CHRONIC RESPIRATORY FAILURE, UNSPECIFIED WHETHER WITH HYPOXIA OR HYPERCAPNIA (HCC): Primary | ICD-10-CM

## 2019-07-26 PROBLEM — J96.21 ACUTE ON CHRONIC RESPIRATORY FAILURE WITH HYPOXIA AND HYPERCAPNIA (HCC): Status: ACTIVE | Noted: 2019-07-26

## 2019-07-26 PROBLEM — J96.21 ACUTE ON CHRONIC RESPIRATORY FAILURE WITH HYPOXIA (HCC): Status: ACTIVE | Noted: 2019-07-26

## 2019-07-26 PROBLEM — J96.22 ACUTE ON CHRONIC RESPIRATORY FAILURE WITH HYPOXIA AND HYPERCAPNIA (HCC): Status: ACTIVE | Noted: 2019-07-26

## 2019-07-26 LAB
A/G RATIO: 0.9 (ref 1.1–2.2)
ALBUMIN SERPL-MCNC: 3.6 G/DL (ref 3.4–5)
ALP BLD-CCNC: 108 U/L (ref 40–129)
ALT SERPL-CCNC: 31 U/L (ref 10–40)
ANION GAP SERPL CALCULATED.3IONS-SCNC: 11 MMOL/L (ref 3–16)
APTT: 37.7 SEC (ref 26–36)
AST SERPL-CCNC: 39 U/L (ref 15–37)
BASE EXCESS ARTERIAL: 1 MMOL/L (ref -3–3)
BASOPHILS ABSOLUTE: 0.1 K/UL (ref 0–0.2)
BASOPHILS RELATIVE PERCENT: 0.9 %
BILIRUB SERPL-MCNC: 1.5 MG/DL (ref 0–1)
BILIRUBIN URINE: NEGATIVE
BLOOD, URINE: NEGATIVE
BUN BLDV-MCNC: 19 MG/DL (ref 7–20)
CALCIUM SERPL-MCNC: 9.3 MG/DL (ref 8.3–10.6)
CARBOXYHEMOGLOBIN ARTERIAL: 4.5 % (ref 0–1.5)
CHLORIDE BLD-SCNC: 97 MMOL/L (ref 99–110)
CLARITY: CLEAR
CO2: 26 MMOL/L (ref 21–32)
COLOR: YELLOW
CREAT SERPL-MCNC: 1.4 MG/DL (ref 0.8–1.3)
EKG ATRIAL RATE: 187 BPM
EKG DIAGNOSIS: NORMAL
EKG Q-T INTERVAL: 352 MS
EKG QRS DURATION: 114 MS
EKG QTC CALCULATION (BAZETT): 471 MS
EKG R AXIS: 242 DEGREES
EKG T AXIS: -7 DEGREES
EKG VENTRICULAR RATE: 108 BPM
EOSINOPHILS ABSOLUTE: 0 K/UL (ref 0–0.6)
EOSINOPHILS RELATIVE PERCENT: 0.2 %
GFR AFRICAN AMERICAN: >60
GFR NON-AFRICAN AMERICAN: 51
GLOBULIN: 3.9 G/DL
GLUCOSE BLD-MCNC: 263 MG/DL (ref 70–99)
GLUCOSE BLD-MCNC: 273 MG/DL (ref 70–99)
GLUCOSE BLD-MCNC: 313 MG/DL (ref 70–99)
GLUCOSE URINE: NEGATIVE MG/DL
HCO3 ARTERIAL: 25.8 MMOL/L (ref 21–29)
HCT VFR BLD CALC: 42 % (ref 40.5–52.5)
HEMOGLOBIN, ART, EXTENDED: 14.2 G/DL (ref 13.5–17.5)
HEMOGLOBIN: 14 G/DL (ref 13.5–17.5)
INR BLD: 1.65 (ref 0.86–1.14)
KETONES, URINE: NEGATIVE MG/DL
LEUKOCYTE ESTERASE, URINE: NEGATIVE
LYMPHOCYTES ABSOLUTE: 0.6 K/UL (ref 1–5.1)
LYMPHOCYTES RELATIVE PERCENT: 9.5 %
MCH RBC QN AUTO: 32 PG (ref 26–34)
MCHC RBC AUTO-ENTMCNC: 33.2 G/DL (ref 31–36)
MCV RBC AUTO: 96.3 FL (ref 80–100)
METHEMOGLOBIN ARTERIAL: 0.4 %
MICROSCOPIC EXAMINATION: NORMAL
MONOCYTES ABSOLUTE: 0.4 K/UL (ref 0–1.3)
MONOCYTES RELATIVE PERCENT: 5.8 %
NEUTROPHILS ABSOLUTE: 5.5 K/UL (ref 1.7–7.7)
NEUTROPHILS RELATIVE PERCENT: 83.6 %
NITRITE, URINE: NEGATIVE
O2 CONTENT ARTERIAL: 19 ML/DL
O2 SAT, ARTERIAL: 95.4 %
O2 THERAPY: ABNORMAL
PCO2 ARTERIAL: 41.9 MMHG (ref 35–45)
PDW BLD-RTO: 16.4 % (ref 12.4–15.4)
PERFORMED ON: ABNORMAL
PERFORMED ON: ABNORMAL
PH ARTERIAL: 7.41 (ref 7.35–7.45)
PH UA: 5.5 (ref 5–8)
PLATELET # BLD: 137 K/UL (ref 135–450)
PMV BLD AUTO: 8.9 FL (ref 5–10.5)
PO2 ARTERIAL: 76.3 MMHG (ref 75–108)
POTASSIUM REFLEX MAGNESIUM: 4.6 MMOL/L (ref 3.5–5.1)
POTASSIUM SERPL-SCNC: 4.6 MMOL/L (ref 3.5–5.1)
PRO-BNP: 1026 PG/ML (ref 0–124)
PROTEIN UA: NEGATIVE MG/DL
PROTHROMBIN TIME: 18.8 SEC (ref 9.8–13)
RBC # BLD: 4.36 M/UL (ref 4.2–5.9)
SODIUM BLD-SCNC: 134 MMOL/L (ref 136–145)
SPECIFIC GRAVITY UA: 1.01 (ref 1–1.03)
TCO2 ARTERIAL: 27.1 MMOL/L
TOTAL PROTEIN: 7.5 G/DL (ref 6.4–8.2)
TROPONIN: <0.01 NG/ML
URINE REFLEX TO CULTURE: NORMAL
URINE TYPE: NORMAL
UROBILINOGEN, URINE: 0.2 E.U./DL
WBC # BLD: 6.6 K/UL (ref 4–11)

## 2019-07-26 PROCEDURE — 2580000003 HC RX 258

## 2019-07-26 PROCEDURE — 93010 ELECTROCARDIOGRAM REPORT: CPT | Performed by: INTERNAL MEDICINE

## 2019-07-26 PROCEDURE — 85025 COMPLETE CBC W/AUTO DIFF WBC: CPT

## 2019-07-26 PROCEDURE — 71046 X-RAY EXAM CHEST 2 VIEWS: CPT

## 2019-07-26 PROCEDURE — 81003 URINALYSIS AUTO W/O SCOPE: CPT

## 2019-07-26 PROCEDURE — 96375 TX/PRO/DX INJ NEW DRUG ADDON: CPT

## 2019-07-26 PROCEDURE — 2700000000 HC OXYGEN THERAPY PER DAY

## 2019-07-26 PROCEDURE — 6370000000 HC RX 637 (ALT 250 FOR IP): Performed by: PHYSICIAN ASSISTANT

## 2019-07-26 PROCEDURE — 94150 VITAL CAPACITY TEST: CPT

## 2019-07-26 PROCEDURE — 85730 THROMBOPLASTIN TIME PARTIAL: CPT

## 2019-07-26 PROCEDURE — 85610 PROTHROMBIN TIME: CPT

## 2019-07-26 PROCEDURE — 93005 ELECTROCARDIOGRAM TRACING: CPT | Performed by: PHYSICIAN ASSISTANT

## 2019-07-26 PROCEDURE — 84484 ASSAY OF TROPONIN QUANT: CPT

## 2019-07-26 PROCEDURE — 80053 COMPREHEN METABOLIC PANEL: CPT

## 2019-07-26 PROCEDURE — 84145 PROCALCITONIN (PCT): CPT

## 2019-07-26 PROCEDURE — 94761 N-INVAS EAR/PLS OXIMETRY MLT: CPT

## 2019-07-26 PROCEDURE — 96374 THER/PROPH/DIAG INJ IV PUSH: CPT

## 2019-07-26 PROCEDURE — 2500000003 HC RX 250 WO HCPCS: Performed by: PHYSICIAN ASSISTANT

## 2019-07-26 PROCEDURE — 83880 ASSAY OF NATRIURETIC PEPTIDE: CPT

## 2019-07-26 PROCEDURE — 6370000000 HC RX 637 (ALT 250 FOR IP): Performed by: INTERNAL MEDICINE

## 2019-07-26 PROCEDURE — 6360000002 HC RX W HCPCS: Performed by: PHYSICIAN ASSISTANT

## 2019-07-26 PROCEDURE — 99291 CRITICAL CARE FIRST HOUR: CPT

## 2019-07-26 PROCEDURE — 2580000003 HC RX 258: Performed by: PHYSICIAN ASSISTANT

## 2019-07-26 PROCEDURE — 82803 BLOOD GASES ANY COMBINATION: CPT

## 2019-07-26 PROCEDURE — 94640 AIRWAY INHALATION TREATMENT: CPT

## 2019-07-26 PROCEDURE — 70450 CT HEAD/BRAIN W/O DYE: CPT

## 2019-07-26 PROCEDURE — 2060000000 HC ICU INTERMEDIATE R&B

## 2019-07-26 PROCEDURE — 36415 COLL VENOUS BLD VENIPUNCTURE: CPT

## 2019-07-26 RX ORDER — SODIUM CHLORIDE 0.9 % (FLUSH) 0.9 %
10 SYRINGE (ML) INJECTION PRN
Status: DISCONTINUED | OUTPATIENT
Start: 2019-07-26 | End: 2019-07-30 | Stop reason: HOSPADM

## 2019-07-26 RX ORDER — ALBUTEROL SULFATE 2.5 MG/3ML
2.5 SOLUTION RESPIRATORY (INHALATION)
Status: DISCONTINUED | OUTPATIENT
Start: 2019-07-26 | End: 2019-07-26

## 2019-07-26 RX ORDER — BICALUTAMIDE 50 MG/1
50 TABLET, FILM COATED ORAL DAILY
Status: DISCONTINUED | OUTPATIENT
Start: 2019-07-27 | End: 2019-07-26

## 2019-07-26 RX ORDER — GABAPENTIN 300 MG/1
600 CAPSULE ORAL 2 TIMES DAILY
Status: DISCONTINUED | OUTPATIENT
Start: 2019-07-26 | End: 2019-07-30 | Stop reason: HOSPADM

## 2019-07-26 RX ORDER — METHYLPREDNISOLONE SODIUM SUCCINATE 40 MG/ML
40 INJECTION, POWDER, LYOPHILIZED, FOR SOLUTION INTRAMUSCULAR; INTRAVENOUS EVERY 6 HOURS
Status: DISCONTINUED | OUTPATIENT
Start: 2019-07-26 | End: 2019-07-27

## 2019-07-26 RX ORDER — IPRATROPIUM BROMIDE AND ALBUTEROL SULFATE 2.5; .5 MG/3ML; MG/3ML
1 SOLUTION RESPIRATORY (INHALATION)
Status: DISCONTINUED | OUTPATIENT
Start: 2019-07-26 | End: 2019-07-26

## 2019-07-26 RX ORDER — SODIUM CHLORIDE 0.9 % (FLUSH) 0.9 %
10 SYRINGE (ML) INJECTION EVERY 12 HOURS SCHEDULED
Status: DISCONTINUED | OUTPATIENT
Start: 2019-07-26 | End: 2019-07-30 | Stop reason: HOSPADM

## 2019-07-26 RX ORDER — ONDANSETRON 2 MG/ML
4 INJECTION INTRAMUSCULAR; INTRAVENOUS EVERY 6 HOURS PRN
Status: DISCONTINUED | OUTPATIENT
Start: 2019-07-26 | End: 2019-07-30 | Stop reason: HOSPADM

## 2019-07-26 RX ORDER — ALBUTEROL SULFATE 2.5 MG/3ML
2.5 SOLUTION RESPIRATORY (INHALATION)
Status: DISCONTINUED | OUTPATIENT
Start: 2019-07-26 | End: 2019-07-30 | Stop reason: HOSPADM

## 2019-07-26 RX ORDER — FUROSEMIDE 40 MG/1
40 TABLET ORAL DAILY
Status: DISCONTINUED | OUTPATIENT
Start: 2019-07-26 | End: 2019-07-27

## 2019-07-26 RX ORDER — INSULIN GLARGINE 100 [IU]/ML
60 INJECTION, SOLUTION SUBCUTANEOUS 2 TIMES DAILY
Status: DISCONTINUED | OUTPATIENT
Start: 2019-07-26 | End: 2019-07-30 | Stop reason: HOSPADM

## 2019-07-26 RX ORDER — PREDNISONE 20 MG/1
40 TABLET ORAL DAILY
Status: DISCONTINUED | OUTPATIENT
Start: 2019-07-29 | End: 2019-07-27

## 2019-07-26 RX ORDER — PROCHLORPERAZINE MALEATE 10 MG
10 TABLET ORAL 2 TIMES DAILY
COMMUNITY
End: 2021-10-16

## 2019-07-26 RX ORDER — LANOLIN ALCOHOL/MO/W.PET/CERES
6 CREAM (GRAM) TOPICAL NIGHTLY PRN
Status: DISCONTINUED | OUTPATIENT
Start: 2019-07-26 | End: 2019-07-30 | Stop reason: HOSPADM

## 2019-07-26 RX ORDER — FUROSEMIDE 10 MG/ML
40 INJECTION INTRAMUSCULAR; INTRAVENOUS ONCE
Status: COMPLETED | OUTPATIENT
Start: 2019-07-26 | End: 2019-07-26

## 2019-07-26 RX ORDER — DILTIAZEM HYDROCHLORIDE 240 MG/1
240 CAPSULE, COATED, EXTENDED RELEASE ORAL DAILY
Status: DISCONTINUED | OUTPATIENT
Start: 2019-07-26 | End: 2019-07-30 | Stop reason: HOSPADM

## 2019-07-26 RX ORDER — FINASTERIDE 5 MG/1
5 TABLET, FILM COATED ORAL DAILY
Status: DISCONTINUED | OUTPATIENT
Start: 2019-07-26 | End: 2019-07-30 | Stop reason: HOSPADM

## 2019-07-26 RX ORDER — IPRATROPIUM BROMIDE AND ALBUTEROL SULFATE 2.5; .5 MG/3ML; MG/3ML
1 SOLUTION RESPIRATORY (INHALATION) 3 TIMES DAILY
Status: DISCONTINUED | OUTPATIENT
Start: 2019-07-26 | End: 2019-07-30 | Stop reason: HOSPADM

## 2019-07-26 RX ORDER — PROCHLORPERAZINE MALEATE 10 MG
10 TABLET ORAL 2 TIMES DAILY
Status: DISCONTINUED | OUTPATIENT
Start: 2019-07-27 | End: 2019-07-30 | Stop reason: HOSPADM

## 2019-07-26 RX ORDER — ACETAMINOPHEN 325 MG/1
650 TABLET ORAL EVERY 4 HOURS PRN
Status: DISCONTINUED | OUTPATIENT
Start: 2019-07-26 | End: 2019-07-30 | Stop reason: HOSPADM

## 2019-07-26 RX ORDER — TAMSULOSIN HYDROCHLORIDE 0.4 MG/1
0.4 CAPSULE ORAL DAILY
Status: DISCONTINUED | OUTPATIENT
Start: 2019-07-26 | End: 2019-07-30 | Stop reason: HOSPADM

## 2019-07-26 RX ORDER — DILTIAZEM HYDROCHLORIDE 5 MG/ML
15 INJECTION INTRAVENOUS ONCE
Status: COMPLETED | OUTPATIENT
Start: 2019-07-26 | End: 2019-07-26

## 2019-07-26 RX ORDER — SODIUM CHLORIDE 9 MG/ML
INJECTION, SOLUTION INTRAVENOUS
Status: COMPLETED
Start: 2019-07-26 | End: 2019-07-26

## 2019-07-26 RX ADMIN — METOPROLOL TARTRATE 25 MG: 25 TABLET ORAL at 20:58

## 2019-07-26 RX ADMIN — IPRATROPIUM BROMIDE AND ALBUTEROL SULFATE 1 AMPULE: .5; 3 SOLUTION RESPIRATORY (INHALATION) at 19:30

## 2019-07-26 RX ADMIN — SODIUM CHLORIDE: 9 INJECTION, SOLUTION INTRAVENOUS at 23:34

## 2019-07-26 RX ADMIN — MELATONIN 3 MG ORAL TABLET 6 MG: 3 TABLET ORAL at 20:58

## 2019-07-26 RX ADMIN — GABAPENTIN 600 MG: 300 CAPSULE ORAL at 20:58

## 2019-07-26 RX ADMIN — Medication 10 ML: at 20:59

## 2019-07-26 RX ADMIN — DILTIAZEM HYDROCHLORIDE 15 MG: 5 INJECTION INTRAVENOUS at 12:58

## 2019-07-26 RX ADMIN — METHYLPREDNISOLONE SODIUM SUCCINATE 40 MG: 40 INJECTION, POWDER, FOR SOLUTION INTRAMUSCULAR; INTRAVENOUS at 20:58

## 2019-07-26 RX ADMIN — TAMSULOSIN HYDROCHLORIDE 0.4 MG: 0.4 CAPSULE ORAL at 20:58

## 2019-07-26 RX ADMIN — APIXABAN 5 MG: 5 TABLET, FILM COATED ORAL at 20:58

## 2019-07-26 RX ADMIN — DILTIAZEM HYDROCHLORIDE 240 MG: 240 CAPSULE, COATED, EXTENDED RELEASE ORAL at 20:59

## 2019-07-26 RX ADMIN — FUROSEMIDE 40 MG: 10 INJECTION, SOLUTION INTRAMUSCULAR; INTRAVENOUS at 12:54

## 2019-07-26 RX ADMIN — FINASTERIDE 5 MG: 5 TABLET, FILM COATED ORAL at 20:59

## 2019-07-26 RX ADMIN — FUROSEMIDE 40 MG: 40 TABLET ORAL at 20:58

## 2019-07-26 RX ADMIN — INSULIN GLARGINE 60 UNITS: 100 INJECTION, SOLUTION SUBCUTANEOUS at 22:34

## 2019-07-26 RX ADMIN — AZITHROMYCIN DIHYDRATE 500 MG: 500 INJECTION, POWDER, LYOPHILIZED, FOR SOLUTION INTRAVENOUS at 20:57

## 2019-07-26 ASSESSMENT — ENCOUNTER SYMPTOMS
VOMITING: 0
NAUSEA: 0
SHORTNESS OF BREATH: 1
COUGH: 0

## 2019-07-26 NOTE — ED PROVIDER NOTES
 Asthma     Atrial fibrillation (HCC)     COPD (chronic obstructive pulmonary disease) (HCC)     Diabetes mellitus (HCC)     Obstructive sleep apnea     Paroxysmal atrial fibrillation (HCC)          SURGICAL HISTORY       Past Surgical History:   Procedure Laterality Date    ATRIAL ABLATION SURGERY      CARPAL TUNNEL RELEASE      KNEE ARTHROSCOPY      NECK SURGERY      TONSILLECTOMY           CURRENT MEDICATIONS       Previous Medications    APIXABAN (ELIQUIS) 5 MG TABS TABLET    Take by mouth 2 times daily    BASAGLAR KWIKPEN 100 UNIT/ML INJECTION PEN    Inject 10-40 Units into the skin nightly     BICALUTAMIDE (CASODEX) 50 MG CHEMO TABLET    Take 50 mg by mouth daily    DILTIAZEM (CARDIZEM CD) 240 MG EXTENDED RELEASE CAPSULE    Take 1 capsule by mouth daily    DULAGLUTIDE (TRULICITY SC)    Inject 0.5 mLs into the skin once a week     FINASTERIDE (PROSCAR) 5 MG TABLET    Take 1 tablet by mouth daily    FUROSEMIDE (LASIX) 20 MG TABLET    Take 40 mg by mouth daily     GABAPENTIN (NEURONTIN) 300 MG CAPSULE    Take 600 mg by mouth 2 times daily.      GLIPIZIDE (GLUCOTROL) 5 MG TABLET    Take 5 mg by mouth daily     LEUPROLIDE ACETATE, 3 MONTH, (ELIGARD) 22.5 MG KIT    Inject 22.5 mg into the skin daily     MELATONIN 3 MG TABS TABLET    Take 5-10 mg by mouth nightly as needed    METOPROLOL TARTRATE (LOPRESSOR) 50 MG TABLET    Take 1 tablet by mouth 2 times daily    MIRTAZAPINE (REMERON) 15 MG TABLET    Take 7.5 mg by mouth nightly    NONFORMULARY    Take 600 mg by mouth 2 times daily Lymparza - experimental cancer drug    POTASSIUM CHLORIDE (KLOR-CON M) 10 MEQ EXTENDED RELEASE TABLET    Take 10 mEq by mouth daily    PREDNISONE (DELTASONE) 10 MG TABLET    2 qd- 3 days 1 qd- 3 days    PROCHLORPERAZINE (COMPAZINE) 10 MG TABLET    Take 10 mg by mouth 2 times daily    SITAGLIPTIN (JANUVIA) 100 MG TABLET    Take 100 mg by mouth daily    TAMSULOSIN (FLOMAX) 0.4 MG CAPSULE    Take 0.4-0.8 mg by mouth daily He appears well-developed and well-nourished. HENT:   Head: Normocephalic. Nose: Nose normal.   Mouth/Throat: Oropharynx is clear and moist. No oropharyngeal exudate. Eyes: Pupils are equal, round, and reactive to light. Conjunctivae and EOM are normal. Right eye exhibits no discharge. Left eye exhibits no discharge. Neck: Normal range of motion. Cardiovascular: Exam reveals no gallop and no friction rub. No murmur heard. Pulmonary/Chest: No respiratory distress. He has no wheezes. Abdominal: Soft. Bowel sounds are normal. He exhibits no distension. There is no tenderness. Musculoskeletal: Normal range of motion. Neurological: He is alert. Skin: Skin is warm and dry. He is not diaphoretic. Psychiatric: He has a normal mood and affect. His behavior is normal.   Nursing note and vitals reviewed.       DIAGNOSTIC RESULTS   LABS:    Labs Reviewed   CBC WITH AUTO DIFFERENTIAL - Abnormal; Notable for the following components:       Result Value    RDW 16.4 (*)     Lymphocytes # 0.6 (*)     All other components within normal limits    Narrative:     Performed at:  [facility],  [facility]   Phone [phone]   BASIC METABOLIC PANEL - Abnormal; Notable for the following components:    Sodium 134 (*)     Chloride 97 (*)     Glucose 313 (*)     CREATININE 1.4 (*)     GFR Non- 51 (*)     All other components within normal limits    Narrative:     Performed at:  [place] [address],  [facility] [facility]   Phone [phone]   COMPREHENSIVE METABOLIC PANEL W/ REFLEX TO MG FOR LOW K - Abnormal; Notable for the following components:    Albumin/Globulin Ratio 0.9 (*)     Total Bilirubin 1.5 (*)     AST 39 (*)     All other components within normal limits    Narrative:     Performed at:  [facility] - [facility],  [facility] [facility]   Phone [phone]   BRAIN NATRIURETIC PEPTIDE - Abnormal; Notable for the following components:    Pro-BNP 1,026 (*)     All other components within normal limits    Narrative:     Performed at:  [facility] [address],  [place], [employer]   Phone [phone]   PROTIME-INR - Abnormal; Notable for the following components:    Protime 18.8 (*)     INR 1.65 (*)     All other components within normal limits    Narrative:     Performed at:  [facility] [address],  [place], [employer]   Phone [phone]   APTT - Abnormal; Notable for the following components:    aPTT 37.7 (*)     All other components within normal limits    Narrative:     Performed at:  [place] [address],  [place], [employer]   Phone [phone]   BLOOD GAS, ARTERIAL - Abnormal; Notable for the following components:    Carboxyhgb, Arterial 4.5 (*)     All other components within normal limits    Narrative:     Performed at:  [facility]) - [facility],  [place], [employer]   Phone [phone]   POCT GLUCOSE - Abnormal; Notable for the following components:    POC Glucose 273 (*)     All other components within normal limits    Narrative:     Performed at:  [facility] [address],  [place], [employer]   Phone [phone]   TROPONIN    Narrative:     Performed at:  [place] [address],  [place], [employer]   Phone [phone]   URINE RT REFLEX TO CULTURE    Narrative:     Performed at:  [facility] [address],  [place], [place]   Phone [phone]       All other labs were within normal range or notreturned as of this dictation. EKG:  All EKG's are interpreted by the Emergency Department Physician who either signs or Co-signs this chart in the absence of a evidence of bleed or mets. Chest x-ray shows no acute disease or significant change from prior study 1 month ago. Seen in conjunction with attending ED provider who agrees with assessment and plan. Discussed discharge to home versus admission. Attending provider advised, admission due to increased oxygen demand. Consult to the hospitalist for admission. All questions are answered. The patient tolerated their visit well. They were seen and evaluated by the Martin Jacob MD who agreed with the assessment and plan. The patient and / or the family were informed of the results of any tests, a time was given to answer questions, a plan was proposed and they agreed Joana Shah. FINAL IMPRESSION      1. Acute on chronic respiratory failure, unspecified whether with hypoxia or hypercapnia ([address]    2.  Atrial fibrillation with RVR (HCC)          DISPOSITION/PLAN   DISPOSITION      ADMIT in stable condition       (Please note that portions of this note were completed with a voice recognition program.  Efforts were made to edit the dictations but occasionally words are mis-transcribed.)    Mendy Lai PA-C (electronically signed)        Mendy Lai PA-C  07/26/19 [id number]

## 2019-07-26 NOTE — PROGRESS NOTES
Pt admitted to 302 in stable condition. VSS on 5 lpm. Admission questions answered and home medications verified with home med list and daughter.

## 2019-07-27 LAB
ALBUMIN SERPL-MCNC: 3.3 G/DL (ref 3.4–5)
ALBUMIN SERPL-MCNC: 3.7 G/DL (ref 3.4–5)
ANION GAP SERPL CALCULATED.3IONS-SCNC: 10 MMOL/L (ref 3–16)
ANION GAP SERPL CALCULATED.3IONS-SCNC: 16 MMOL/L (ref 3–16)
ANION GAP SERPL CALCULATED.3IONS-SCNC: 18 MMOL/L (ref 3–16)
BASOPHILS ABSOLUTE: 0 K/UL (ref 0–0.2)
BASOPHILS RELATIVE PERCENT: 0.2 %
BETA-HYDROXYBUTYRATE: 0.4 MMOL/L (ref 0–0.27)
BUN BLDV-MCNC: 24 MG/DL (ref 7–20)
BUN BLDV-MCNC: 34 MG/DL (ref 7–20)
BUN BLDV-MCNC: 36 MG/DL (ref 7–20)
CALCIUM SERPL-MCNC: 8.8 MG/DL (ref 8.3–10.6)
CALCIUM SERPL-MCNC: 8.9 MG/DL (ref 8.3–10.6)
CALCIUM SERPL-MCNC: 9.2 MG/DL (ref 8.3–10.6)
CHLORIDE BLD-SCNC: 90 MMOL/L (ref 99–110)
CHLORIDE BLD-SCNC: 91 MMOL/L (ref 99–110)
CHLORIDE BLD-SCNC: 93 MMOL/L (ref 99–110)
CO2: 20 MMOL/L (ref 21–32)
CO2: 23 MMOL/L (ref 21–32)
CO2: 26 MMOL/L (ref 21–32)
CREAT SERPL-MCNC: 1.6 MG/DL (ref 0.8–1.3)
CREAT SERPL-MCNC: 1.7 MG/DL (ref 0.8–1.3)
CREAT SERPL-MCNC: 1.8 MG/DL (ref 0.8–1.3)
EOSINOPHILS ABSOLUTE: 0 K/UL (ref 0–0.6)
EOSINOPHILS RELATIVE PERCENT: 0.1 %
GFR AFRICAN AMERICAN: 46
GFR AFRICAN AMERICAN: 49
GFR AFRICAN AMERICAN: 53
GFR NON-AFRICAN AMERICAN: 38
GFR NON-AFRICAN AMERICAN: 41
GFR NON-AFRICAN AMERICAN: 44
GLUCOSE BLD-MCNC: 417 MG/DL (ref 70–99)
GLUCOSE BLD-MCNC: 514 MG/DL (ref 70–99)
GLUCOSE BLD-MCNC: 515 MG/DL (ref 70–99)
GLUCOSE BLD-MCNC: 542 MG/DL (ref 70–99)
GLUCOSE BLD-MCNC: 561 MG/DL (ref 70–99)
GLUCOSE BLD-MCNC: 568 MG/DL (ref 70–99)
GLUCOSE BLD-MCNC: 649 MG/DL (ref 70–99)
GLUCOSE BLD-MCNC: 659 MG/DL (ref 70–99)
GLUCOSE BLD-MCNC: >600 MG/DL (ref 70–99)
GLUCOSE BLD-MCNC: >600 MG/DL (ref 70–99)
HCT VFR BLD CALC: 41.2 % (ref 40.5–52.5)
HEMOGLOBIN: 13.5 G/DL (ref 13.5–17.5)
LYMPHOCYTES ABSOLUTE: 0.4 K/UL (ref 1–5.1)
LYMPHOCYTES RELATIVE PERCENT: 7.3 %
MAGNESIUM: 2 MG/DL (ref 1.8–2.4)
MCH RBC QN AUTO: 32.4 PG (ref 26–34)
MCHC RBC AUTO-ENTMCNC: 32.8 G/DL (ref 31–36)
MCV RBC AUTO: 98.7 FL (ref 80–100)
MONOCYTES ABSOLUTE: 0.1 K/UL (ref 0–1.3)
MONOCYTES RELATIVE PERCENT: 1.5 %
NEUTROPHILS ABSOLUTE: 5 K/UL (ref 1.7–7.7)
NEUTROPHILS RELATIVE PERCENT: 90.9 %
PDW BLD-RTO: 16.2 % (ref 12.4–15.4)
PERFORMED ON: ABNORMAL
PHOSPHORUS: 3.4 MG/DL (ref 2.5–4.9)
PHOSPHORUS: 4.1 MG/DL (ref 2.5–4.9)
PLATELET # BLD: 130 K/UL (ref 135–450)
PMV BLD AUTO: 9.8 FL (ref 5–10.5)
POTASSIUM REFLEX MAGNESIUM: 5.8 MMOL/L (ref 3.5–5.1)
POTASSIUM SERPL-SCNC: 4.8 MMOL/L (ref 3.5–5.1)
POTASSIUM SERPL-SCNC: 5.1 MMOL/L (ref 3.5–5.1)
PROCALCITONIN: 0.24 NG/ML (ref 0–0.15)
RBC # BLD: 4.17 M/UL (ref 4.2–5.9)
SODIUM BLD-SCNC: 128 MMOL/L (ref 136–145)
SODIUM BLD-SCNC: 129 MMOL/L (ref 136–145)
SODIUM BLD-SCNC: 130 MMOL/L (ref 136–145)
WBC # BLD: 5.5 K/UL (ref 4–11)

## 2019-07-27 PROCEDURE — 99223 1ST HOSP IP/OBS HIGH 75: CPT | Performed by: INTERNAL MEDICINE

## 2019-07-27 PROCEDURE — 2580000003 HC RX 258: Performed by: PHYSICIAN ASSISTANT

## 2019-07-27 PROCEDURE — 6370000000 HC RX 637 (ALT 250 FOR IP): Performed by: PHYSICIAN ASSISTANT

## 2019-07-27 PROCEDURE — 2060000000 HC ICU INTERMEDIATE R&B

## 2019-07-27 PROCEDURE — 36415 COLL VENOUS BLD VENIPUNCTURE: CPT

## 2019-07-27 PROCEDURE — 6370000000 HC RX 637 (ALT 250 FOR IP): Performed by: INTERNAL MEDICINE

## 2019-07-27 PROCEDURE — 2580000003 HC RX 258: Performed by: INTERNAL MEDICINE

## 2019-07-27 PROCEDURE — 94761 N-INVAS EAR/PLS OXIMETRY MLT: CPT

## 2019-07-27 PROCEDURE — 80069 RENAL FUNCTION PANEL: CPT

## 2019-07-27 PROCEDURE — 80048 BASIC METABOLIC PNL TOTAL CA: CPT

## 2019-07-27 PROCEDURE — 94640 AIRWAY INHALATION TREATMENT: CPT

## 2019-07-27 PROCEDURE — 83735 ASSAY OF MAGNESIUM: CPT

## 2019-07-27 PROCEDURE — 6360000002 HC RX W HCPCS: Performed by: PHYSICIAN ASSISTANT

## 2019-07-27 PROCEDURE — 82010 KETONE BODYS QUAN: CPT

## 2019-07-27 PROCEDURE — 85025 COMPLETE CBC W/AUTO DIFF WBC: CPT

## 2019-07-27 PROCEDURE — 2700000000 HC OXYGEN THERAPY PER DAY

## 2019-07-27 RX ORDER — DEXTROSE MONOHYDRATE 25 G/50ML
12.5 INJECTION, SOLUTION INTRAVENOUS PRN
Status: DISCONTINUED | OUTPATIENT
Start: 2019-07-27 | End: 2019-07-30 | Stop reason: HOSPADM

## 2019-07-27 RX ORDER — DOXYCYCLINE HYCLATE 100 MG
100 TABLET ORAL EVERY 12 HOURS SCHEDULED
Status: DISCONTINUED | OUTPATIENT
Start: 2019-07-27 | End: 2019-07-30 | Stop reason: HOSPADM

## 2019-07-27 RX ORDER — NICOTINE POLACRILEX 4 MG
15 LOZENGE BUCCAL PRN
Status: DISCONTINUED | OUTPATIENT
Start: 2019-07-27 | End: 2019-07-30 | Stop reason: HOSPADM

## 2019-07-27 RX ORDER — GABAPENTIN 600 MG/1
1200 TABLET ORAL 2 TIMES DAILY
COMMUNITY
End: 2021-10-16

## 2019-07-27 RX ORDER — DEXTROSE MONOHYDRATE 50 MG/ML
100 INJECTION, SOLUTION INTRAVENOUS PRN
Status: DISCONTINUED | OUTPATIENT
Start: 2019-07-27 | End: 2019-07-30 | Stop reason: HOSPADM

## 2019-07-27 RX ORDER — FUROSEMIDE 40 MG/1
40 TABLET ORAL DAILY
Status: DISCONTINUED | OUTPATIENT
Start: 2019-07-28 | End: 2019-07-27

## 2019-07-27 RX ORDER — SODIUM CHLORIDE 9 MG/ML
INJECTION, SOLUTION INTRAVENOUS CONTINUOUS
Status: DISCONTINUED | OUTPATIENT
Start: 2019-07-27 | End: 2019-07-29

## 2019-07-27 RX ADMIN — METOPROLOL TARTRATE 25 MG: 25 TABLET ORAL at 21:49

## 2019-07-27 RX ADMIN — PROCHLORPERAZINE MALEATE 10 MG: 10 TABLET, FILM COATED ORAL at 21:49

## 2019-07-27 RX ADMIN — Medication 10 ML: at 09:26

## 2019-07-27 RX ADMIN — TAMSULOSIN HYDROCHLORIDE 0.4 MG: 0.4 CAPSULE ORAL at 09:25

## 2019-07-27 RX ADMIN — INSULIN LISPRO 12 UNITS: 100 INJECTION, SOLUTION INTRAVENOUS; SUBCUTANEOUS at 11:51

## 2019-07-27 RX ADMIN — APIXABAN 5 MG: 5 TABLET, FILM COATED ORAL at 21:49

## 2019-07-27 RX ADMIN — INSULIN HUMAN 10 UNITS: 100 INJECTION, SOLUTION PARENTERAL at 09:28

## 2019-07-27 RX ADMIN — DOXYCYCLINE HYCLATE 100 MG: 100 TABLET, COATED ORAL at 11:50

## 2019-07-27 RX ADMIN — Medication 10 ML: at 21:49

## 2019-07-27 RX ADMIN — INSULIN HUMAN 12 UNITS: 100 INJECTION, SOLUTION PARENTERAL at 23:56

## 2019-07-27 RX ADMIN — METOPROLOL TARTRATE 25 MG: 25 TABLET ORAL at 09:25

## 2019-07-27 RX ADMIN — FINASTERIDE 5 MG: 5 TABLET, FILM COATED ORAL at 09:24

## 2019-07-27 RX ADMIN — SODIUM CHLORIDE: 9 INJECTION, SOLUTION INTRAVENOUS at 18:16

## 2019-07-27 RX ADMIN — DOXYCYCLINE HYCLATE 100 MG: 100 TABLET, COATED ORAL at 21:49

## 2019-07-27 RX ADMIN — APIXABAN 5 MG: 5 TABLET, FILM COATED ORAL at 09:25

## 2019-07-27 RX ADMIN — METHYLPREDNISOLONE SODIUM SUCCINATE 40 MG: 40 INJECTION, POWDER, FOR SOLUTION INTRAMUSCULAR; INTRAVENOUS at 00:17

## 2019-07-27 RX ADMIN — IPRATROPIUM BROMIDE AND ALBUTEROL SULFATE 1 AMPULE: .5; 3 SOLUTION RESPIRATORY (INHALATION) at 13:28

## 2019-07-27 RX ADMIN — PROCHLORPERAZINE MALEATE 10 MG: 10 TABLET, FILM COATED ORAL at 09:24

## 2019-07-27 RX ADMIN — METHYLPREDNISOLONE SODIUM SUCCINATE 40 MG: 40 INJECTION, POWDER, FOR SOLUTION INTRAMUSCULAR; INTRAVENOUS at 05:28

## 2019-07-27 RX ADMIN — IPRATROPIUM BROMIDE AND ALBUTEROL SULFATE 1 AMPULE: .5; 3 SOLUTION RESPIRATORY (INHALATION) at 06:50

## 2019-07-27 RX ADMIN — INSULIN HUMAN 12 UNITS: 100 INJECTION, SOLUTION PARENTERAL at 20:10

## 2019-07-27 RX ADMIN — IPRATROPIUM BROMIDE AND ALBUTEROL SULFATE 1 AMPULE: .5; 3 SOLUTION RESPIRATORY (INHALATION) at 20:12

## 2019-07-27 RX ADMIN — INSULIN HUMAN 12 UNITS: 100 INJECTION, SOLUTION PARENTERAL at 18:15

## 2019-07-27 RX ADMIN — INSULIN GLARGINE 60 UNITS: 100 INJECTION, SOLUTION SUBCUTANEOUS at 21:50

## 2019-07-27 RX ADMIN — INSULIN GLARGINE 60 UNITS: 100 INJECTION, SOLUTION SUBCUTANEOUS at 09:36

## 2019-07-27 RX ADMIN — GABAPENTIN 600 MG: 300 CAPSULE ORAL at 21:49

## 2019-07-27 RX ADMIN — DILTIAZEM HYDROCHLORIDE 240 MG: 240 CAPSULE, COATED, EXTENDED RELEASE ORAL at 09:25

## 2019-07-27 RX ADMIN — INSULIN LISPRO 7 UNITS: 100 INJECTION, SOLUTION INTRAVENOUS; SUBCUTANEOUS at 11:52

## 2019-07-27 RX ADMIN — GABAPENTIN 600 MG: 300 CAPSULE ORAL at 09:24

## 2019-07-27 NOTE — PROGRESS NOTES
Per pt, pulm does not want pts. Spo2 above 86%. Respiratory therapy notified of Sp02 maintaining in 80s.

## 2019-07-27 NOTE — H&P
10-40 Units into the skin nightly  3/4/19  Yes Historical Provider, MD   tamsulosin (FLOMAX) 0.4 MG capsule Take 0.4-0.8 mg by mouth daily    Yes Historical Provider, MD   Dulaglutide (TRULICITY SC) Inject 0.5 mLs into the skin once a week    Yes Historical Provider, MD   apixaban (ELIQUIS) 5 MG TABS tablet Take by mouth 2 times daily   Yes Historical Provider, MD   furosemide (LASIX) 20 MG tablet Take 40 mg by mouth daily    Yes Historical Provider, MD   gabapentin (NEURONTIN) 300 MG capsule Take 600 mg by mouth 2 times daily. Yes Historical Provider, MD   glipiZIDE (GLUCOTROL) 5 MG tablet Take 5 mg by mouth daily    Yes Historical Provider, MD   Leuprolide Acetate, 3 Month, (ELIGARD) 22.5 MG KIT Inject 22.5 mg into the skin daily    Yes Historical Provider, MD   potassium chloride (KLOR-CON M) 10 MEQ extended release tablet Take 10 mEq by mouth daily   Yes Historical Provider, MD   metoprolol tartrate (LOPRESSOR) 50 MG tablet Take 1 tablet by mouth 2 times daily  Patient taking differently: Take 25 mg by mouth 2 times daily  6/9/17  Yes Joon Will MD   diltiazem (CARDIZEM CD) 240 MG extended release capsule Take 1 capsule by mouth daily 6/9/17  Yes Joon Will MD   predniSONE (DELTASONE) 10 MG tablet 2 qd- 3 days 1 qd- 3 days 6/15/19   Vin Funk MD   finasteride (PROSCAR) 5 MG tablet Take 1 tablet by mouth daily 6/15/19   Vin Funk MD   bicalutamide (CASODEX) 50 MG chemo tablet Take 50 mg by mouth daily    Historical Provider, MD       Allergies:  Patient has no known allergies. Social History:  The patient currently lives at home. TOBACCO:   reports that he has been smoking cigarettes. He has a 50.00 pack-year smoking history. He has never used smokeless tobacco.  ETOH:   reports that he drinks alcohol. Family History:  Reviewed in detail and negative for DM, Early CAD, Cancer, CVA. Positive as follows:    History reviewed.  No pertinent family

## 2019-07-27 NOTE — PROGRESS NOTES
Bedside shift report given to [facility], THE RN  -  Care transferred. IVF infusing at 125ml/hr.   Patient talking w/ visitors

## 2019-07-28 LAB
ALBUMIN SERPL-MCNC: 3.5 G/DL (ref 3.4–5)
ALBUMIN SERPL-MCNC: 3.7 G/DL (ref 3.4–5)
ALBUMIN SERPL-MCNC: 3.7 G/DL (ref 3.4–5)
ALBUMIN SERPL-MCNC: 3.9 G/DL (ref 3.4–5)
ANION GAP SERPL CALCULATED.3IONS-SCNC: 11 MMOL/L (ref 3–16)
ANION GAP SERPL CALCULATED.3IONS-SCNC: 12 MMOL/L (ref 3–16)
ANION GAP SERPL CALCULATED.3IONS-SCNC: 12 MMOL/L (ref 3–16)
ANION GAP SERPL CALCULATED.3IONS-SCNC: 17 MMOL/L (ref 3–16)
BUN BLDV-MCNC: 34 MG/DL (ref 7–20)
BUN BLDV-MCNC: 37 MG/DL (ref 7–20)
CALCIUM SERPL-MCNC: 9 MG/DL (ref 8.3–10.6)
CALCIUM SERPL-MCNC: 9 MG/DL (ref 8.3–10.6)
CALCIUM SERPL-MCNC: 9.1 MG/DL (ref 8.3–10.6)
CALCIUM SERPL-MCNC: 9.3 MG/DL (ref 8.3–10.6)
CHLORIDE BLD-SCNC: 91 MMOL/L (ref 99–110)
CHLORIDE BLD-SCNC: 93 MMOL/L (ref 99–110)
CHLORIDE BLD-SCNC: 97 MMOL/L (ref 99–110)
CHLORIDE BLD-SCNC: 97 MMOL/L (ref 99–110)
CO2: 21 MMOL/L (ref 21–32)
CO2: 24 MMOL/L (ref 21–32)
CO2: 25 MMOL/L (ref 21–32)
CO2: 25 MMOL/L (ref 21–32)
CREAT SERPL-MCNC: 1.3 MG/DL (ref 0.8–1.3)
CREAT SERPL-MCNC: 1.4 MG/DL (ref 0.8–1.3)
CREAT SERPL-MCNC: 1.4 MG/DL (ref 0.8–1.3)
CREAT SERPL-MCNC: 1.8 MG/DL (ref 0.8–1.3)
GFR AFRICAN AMERICAN: 46
GFR AFRICAN AMERICAN: >60
GFR NON-AFRICAN AMERICAN: 38
GFR NON-AFRICAN AMERICAN: 51
GFR NON-AFRICAN AMERICAN: 51
GFR NON-AFRICAN AMERICAN: 55
GLUCOSE BLD-MCNC: 376 MG/DL (ref 70–99)
GLUCOSE BLD-MCNC: 381 MG/DL (ref 70–99)
GLUCOSE BLD-MCNC: 404 MG/DL (ref 70–99)
GLUCOSE BLD-MCNC: 411 MG/DL (ref 70–99)
GLUCOSE BLD-MCNC: 429 MG/DL (ref 70–99)
GLUCOSE BLD-MCNC: 452 MG/DL (ref 70–99)
GLUCOSE BLD-MCNC: 454 MG/DL (ref 70–99)
GLUCOSE BLD-MCNC: 457 MG/DL (ref 70–99)
GLUCOSE BLD-MCNC: 464 MG/DL (ref 70–99)
GLUCOSE BLD-MCNC: 465 MG/DL (ref 70–99)
GLUCOSE BLD-MCNC: 473 MG/DL (ref 70–99)
GLUCOSE BLD-MCNC: 480 MG/DL (ref 70–99)
GLUCOSE BLD-MCNC: 490 MG/DL (ref 70–99)
GLUCOSE BLD-MCNC: 490 MG/DL (ref 70–99)
GLUCOSE BLD-MCNC: 535 MG/DL (ref 70–99)
GLUCOSE BLD-MCNC: 588 MG/DL (ref 70–99)
PERFORMED ON: ABNORMAL
PHOSPHORUS: 2.8 MG/DL (ref 2.5–4.9)
PHOSPHORUS: 3.3 MG/DL (ref 2.5–4.9)
PHOSPHORUS: 3.3 MG/DL (ref 2.5–4.9)
PHOSPHORUS: 4 MG/DL (ref 2.5–4.9)
POTASSIUM SERPL-SCNC: 4.6 MMOL/L (ref 3.5–5.1)
POTASSIUM SERPL-SCNC: 4.9 MMOL/L (ref 3.5–5.1)
POTASSIUM SERPL-SCNC: 4.9 MMOL/L (ref 3.5–5.1)
POTASSIUM SERPL-SCNC: 5 MMOL/L (ref 3.5–5.1)
SODIUM BLD-SCNC: 129 MMOL/L (ref 136–145)
SODIUM BLD-SCNC: 130 MMOL/L (ref 136–145)
SODIUM BLD-SCNC: 132 MMOL/L (ref 136–145)
SODIUM BLD-SCNC: 134 MMOL/L (ref 136–145)

## 2019-07-28 PROCEDURE — 6370000000 HC RX 637 (ALT 250 FOR IP): Performed by: INTERNAL MEDICINE

## 2019-07-28 PROCEDURE — 2580000003 HC RX 258: Performed by: PHYSICIAN ASSISTANT

## 2019-07-28 PROCEDURE — 2060000000 HC ICU INTERMEDIATE R&B

## 2019-07-28 PROCEDURE — 94640 AIRWAY INHALATION TREATMENT: CPT

## 2019-07-28 PROCEDURE — 80069 RENAL FUNCTION PANEL: CPT

## 2019-07-28 PROCEDURE — 2700000000 HC OXYGEN THERAPY PER DAY

## 2019-07-28 PROCEDURE — 36415 COLL VENOUS BLD VENIPUNCTURE: CPT

## 2019-07-28 PROCEDURE — 99232 SBSQ HOSP IP/OBS MODERATE 35: CPT | Performed by: INTERNAL MEDICINE

## 2019-07-28 PROCEDURE — 2580000003 HC RX 258: Performed by: INTERNAL MEDICINE

## 2019-07-28 PROCEDURE — 94761 N-INVAS EAR/PLS OXIMETRY MLT: CPT

## 2019-07-28 PROCEDURE — 6370000000 HC RX 637 (ALT 250 FOR IP): Performed by: PHYSICIAN ASSISTANT

## 2019-07-28 RX ADMIN — GABAPENTIN 600 MG: 300 CAPSULE ORAL at 08:24

## 2019-07-28 RX ADMIN — PROCHLORPERAZINE MALEATE 10 MG: 10 TABLET, FILM COATED ORAL at 08:24

## 2019-07-28 RX ADMIN — IPRATROPIUM BROMIDE AND ALBUTEROL SULFATE 1 AMPULE: .5; 3 SOLUTION RESPIRATORY (INHALATION) at 19:24

## 2019-07-28 RX ADMIN — INSULIN GLARGINE 60 UNITS: 100 INJECTION, SOLUTION SUBCUTANEOUS at 08:30

## 2019-07-28 RX ADMIN — TAMSULOSIN HYDROCHLORIDE 0.4 MG: 0.4 CAPSULE ORAL at 08:25

## 2019-07-28 RX ADMIN — INSULIN HUMAN 12 UNITS: 100 INJECTION, SOLUTION PARENTERAL at 12:23

## 2019-07-28 RX ADMIN — INSULIN HUMAN 10 UNITS: 100 INJECTION, SOLUTION PARENTERAL at 08:24

## 2019-07-28 RX ADMIN — APIXABAN 5 MG: 5 TABLET, FILM COATED ORAL at 08:24

## 2019-07-28 RX ADMIN — INSULIN HUMAN 18 UNITS: 100 INJECTION, SOLUTION PARENTERAL at 17:39

## 2019-07-28 RX ADMIN — GABAPENTIN 600 MG: 300 CAPSULE ORAL at 21:39

## 2019-07-28 RX ADMIN — IPRATROPIUM BROMIDE AND ALBUTEROL SULFATE 1 AMPULE: .5; 3 SOLUTION RESPIRATORY (INHALATION) at 06:44

## 2019-07-28 RX ADMIN — DOXYCYCLINE HYCLATE 100 MG: 100 TABLET, COATED ORAL at 08:24

## 2019-07-28 RX ADMIN — DOXYCYCLINE HYCLATE 100 MG: 100 TABLET, COATED ORAL at 21:39

## 2019-07-28 RX ADMIN — DILTIAZEM HYDROCHLORIDE 240 MG: 240 CAPSULE, COATED, EXTENDED RELEASE ORAL at 08:24

## 2019-07-28 RX ADMIN — FINASTERIDE 5 MG: 5 TABLET, FILM COATED ORAL at 08:25

## 2019-07-28 RX ADMIN — INSULIN GLARGINE 60 UNITS: 100 INJECTION, SOLUTION SUBCUTANEOUS at 21:40

## 2019-07-28 RX ADMIN — PROCHLORPERAZINE MALEATE 10 MG: 10 TABLET, FILM COATED ORAL at 21:39

## 2019-07-28 RX ADMIN — IPRATROPIUM BROMIDE AND ALBUTEROL SULFATE 1 AMPULE: .5; 3 SOLUTION RESPIRATORY (INHALATION) at 13:27

## 2019-07-28 RX ADMIN — INSULIN HUMAN 12 UNITS: 100 INJECTION, SOLUTION PARENTERAL at 04:12

## 2019-07-28 RX ADMIN — APIXABAN 5 MG: 5 TABLET, FILM COATED ORAL at 21:39

## 2019-07-28 RX ADMIN — INSULIN HUMAN 18 UNITS: 100 INJECTION, SOLUTION PARENTERAL at 21:40

## 2019-07-28 RX ADMIN — METOPROLOL TARTRATE 25 MG: 25 TABLET ORAL at 08:25

## 2019-07-28 RX ADMIN — Medication 10 ML: at 08:25

## 2019-07-28 RX ADMIN — SODIUM CHLORIDE: 9 INJECTION, SOLUTION INTRAVENOUS at 10:12

## 2019-07-28 RX ADMIN — METOPROLOL TARTRATE 25 MG: 25 TABLET ORAL at 21:39

## 2019-07-28 NOTE — CARE COORDINATION
CASE MANAGEMENT INITIAL ASSESSMENT      Reviewed chart and met with patient today, re:  Discharge planning and home health needs. Explained Case Management role/services. Family present:  no  Primary contact information:  Nieves Zelaya, dtr, [phone]    Admit date/status:  IP 7/26/19  Diagnosis:  Acute on chronic respiratory failure    Insurance:  Medicare, [facility] [place] [place]  Precert required for SNF -  N        3 night stay required - Y    Living arrangements, Adls, care needs, prior to admission:  Lives in walk out basement level in daughter's home with her family. 7 steps to enter. Independent with all ADLs at home. Transportation: self    Durable Medical Equipment at home: Walker__Cane__RTS__ BSC__Shower Chair__  02_x_ HHN__ CPAP_x_  BiPap__  Hospital Bed__ W/C___ Other__________    Services in the home and/or outpatient, prior to admission:  Has oxygen and Cpap through the [place] but does not know the company. Will have family bring portable O2 tank on discharge. Dr Meli Mcguire is pulmonologist    PT/OT recs: [facility] Exemption Notification (HEN):  n/a    Barriers to discharge:  none    Plan/comments:  Patient may want a home care nurse for a few follow up visits at discharge. Will continue to follow.     ECOC on chart for MD signature  yes

## 2019-07-28 NOTE — FLOWSHEET NOTE
07/27/19 2209   Vital Signs   Temp 97.7 °F (36.5 °C)   Temp Source Oral   Pulse 116   Heart Rate Source Monitor   Resp 18   BP (!) 90/38   BP Location Right Arm   BP Upper/Lower Upper   MAP (mmHg) (!) 55   Level of Consciousness 0   MEWS Score 4   Patient Currently in Pain Denies   Oxygen Therapy   SpO2 92 %   O2 Device Nasal cannula   Shift assessment complete, see flow sheets. Patient is alert and oriented,Pain assessed and no c/o pain and appears to be in no distress. Vital signs stable, call light within reach, will continue to monitor.

## 2019-07-28 NOTE — PLAN OF CARE
Problem: Falls - Risk of:  Goal: Will remain free from falls  Description  Will remain free from falls  7/27/2019 2255 by Tess Elias RN  Outcome: Met This Shift  7/27/2019 1944 by Savana Auguste RN  Note:   Free of falls this shift  Goal: Absence of physical injury  Description  Absence of physical injury  Outcome: Met This Shift     Problem: Risk for Impaired Skin Integrity  Goal: Tissue integrity - skin and mucous membranes  Description  Structural intactness and normal physiological function of skin and  mucous membranes.   7/27/2019 2255 by Tess Elias RN  Outcome: Met This Shift  7/27/2019 1944 by Savana Auguste RN  Note:   No new skin breakdown noted     Problem: ABCDS Injury Assessment  Goal: Absence of physical injury  Outcome: Met This Shift

## 2019-07-28 NOTE — PROGRESS NOTES
Hospitalist Progress Note      PCP: Mike Akins MD    Date of Admission: 7/26/2019    Chief Complaint: daughter brought him due to hypoxia at home. Subjective:     Pt reports he feels well. Denies any dyspnea or cough. o2 requirement ongoing at 5l/min. BG some better. Did not go into full blown HHS yesterday, though was very close. Pt's primary complaint is - does not like restrictions on diet and wants to go home. Medications:  Reviewed    Infusion Medications    dextrose      sodium chloride 125 mL/hr at 07/28/19 1012     Scheduled Medications    insulin regular  0-18 Units Intravenous Q4H    doxycycline hyclate  100 mg Oral 2 times per day    apixaban  5 mg Oral BID    diltiazem  240 mg Oral Daily    finasteride  5 mg Oral Daily    gabapentin  600 mg Oral BID    Leuprolide Acetate (3 Month)  22.5 mg Subcutaneous Daily    metoprolol tartrate  25 mg Oral BID    tamsulosin  0.4 mg Oral Daily    sodium chloride flush  10 mL Intravenous 2 times per day    NONFORMULARY 300 mg  300 mg Oral BID    prochlorperazine  10 mg Oral BID    ipratropium-albuterol  1 ampule Inhalation TID    insulin glargine  60 Units Subcutaneous BID     PRN Meds: glucose, dextrose, glucagon (rDNA), dextrose, melatonin, sodium chloride flush, magnesium hydroxide, ondansetron, acetaminophen, albuterol      Intake/Output Summary (Last 24 hours) at 7/28/2019 1404  Last data filed at 7/28/2019 1301  Gross per 24 hour   Intake 2760 ml   Output 2975 ml   Net -215 ml       Physical Exam Performed:    /70   Pulse 80   Temp 96.6 °F (35.9 °C) (Oral)   Resp 20   Ht 6' (1.829 m)   Wt (!) 308 lb 1.6 oz (139.8 kg)   SpO2 92%   BMI 41.79 kg/m²         General appearance: No apparent distress appears stated age and cooperative. HEENT Normal cephalic, atraumatic without obvious deformity. Pupils equal, round, and reactive to light. Extra ocular muscles intact. Conjunctivae/corneas clear.   Neck: Supple, No jugular venous distention/bruits. Trachea midline without thyromegaly or adenopathy with full range of motion. Lungs: globally diminished, no active wheezing or rhonchi. Heart: Regular rate and rhythm with Normal S1/S2 without murmurs, rubs or gallops, point of maximum impulse non-displaced  Abdomen: Soft, non-tender or non-distended without rigidity or guarding and positive bowel sounds all four quadrants. Extremities: 1+ pitting edema. Skin: Skin color, texture, turgor normal.  No rashes or lesions. Neurologic: Alert and oriented X 3, neurovascularly intact with sensory/motor intact upper extremities/lower extremities, bilaterally. Cranial nerves: II-XII intact, grossly non-focal.  Mental status: Alert, oriented, thought content appropriate. Capillary Refill: Acceptable  < 3 seconds  Peripheral Pulses: +3 Easily felt, not easily obliterated with pressure          Labs:   Recent Labs     07/26/19  1202 07/27/19  0408   WBC 6.6 5.5   HGB 14.0 13.5   HCT 42.0 41.2    130*     Recent Labs     07/27/19  2338 07/28/19  0546 07/28/19  1158   * 130* 132*   K 4.6 4.9 5.0   CL 91* 93* 97*   CO2 21 25 24   BUN 37* 34* 34*   CREATININE 1.8* 1.4* 1.4*   CALCIUM 9.0 9.1 9.0   PHOS 4.0 3.3 3.3     Recent Labs     07/26/19  1202   AST 39*   ALT 31   BILITOT 1.5*   ALKPHOS 108     Recent Labs     07/26/19  1202   INR 1.65*     Recent Labs     07/26/19  1202   TROPONINI <0.01       Urinalysis:      Lab Results   Component Value Date    NITRU Negative 07/26/2019    WBCUA 6-10 01/18/2019    BACTERIA 1+ 01/18/2019    RBCUA 5-10 01/18/2019    BLOODU Negative 07/26/2019    SPECGRAV 1.010 07/26/2019    GLUCOSEU Negative 07/26/2019       Radiology:  CT Head WO Contrast   Final Result   No acute intracranial abnormality.          XR CHEST STANDARD (2 VW)   Final Result   No acute cardiopulmonary disease or significant interval change from prior   study 06/13/2019                 Assessment/Plan:    Active Hospital Problems    Diagnosis    Atrial fibrillation with RVR ([facility]) [I48.91]    Acute on chronic respiratory failure with hypoxia ([facility]) [J96.21]    Acute on chronic respiratory failure with hypoxia and hypercapnia ([facility]) [J96.21, J96.22]          COPD with acute on chronic hypox RF. Requirement is up to 5-6l/min - ?if this will be his new baseline. Baseline 3l/min. Very sensitive to steroid - BG up to 659. Pulm involved.         DMII with uncontrolled and severe steroid hyperglycemia. Yesterday was headed into DKA/HHS  Started on IVF and IV humulin R per sliding scale protocol. Continued on lantus 60BID. Increase IV humulin R to high dose correction and keep on q2hrs Accucheck. I am checking his renal panel every 6hrs. Control remains challenging. He is not very excited about dietary changes - reports that's his only remaining comfort.         Hyperkalemia -   Resolving. Insulin IV as above. IVF  Pt not willing to comply with dietary change.         Hyponatremia - pseudohyponatremia due to severe hyperglycemia.          Chronic Afib on   eliquis and BB.         DVT Prophylaxis: eliquis  Diet: DIET CARB CONTROL;  Code Status: Full Code        Dispo - inpatient.      Barriers to discharge - severe hyperglycemia requiring IV insulin and ongoing high O2 requirement.          Alida Gomez MD

## 2019-07-29 LAB
ALBUMIN SERPL-MCNC: 3.5 G/DL (ref 3.4–5)
ALBUMIN SERPL-MCNC: 3.7 G/DL (ref 3.4–5)
ALBUMIN SERPL-MCNC: 3.8 G/DL (ref 3.4–5)
ALBUMIN SERPL-MCNC: 4.1 G/DL (ref 3.4–5)
ANION GAP SERPL CALCULATED.3IONS-SCNC: 10 MMOL/L (ref 3–16)
ANION GAP SERPL CALCULATED.3IONS-SCNC: 10 MMOL/L (ref 3–16)
ANION GAP SERPL CALCULATED.3IONS-SCNC: 12 MMOL/L (ref 3–16)
ANION GAP SERPL CALCULATED.3IONS-SCNC: 14 MMOL/L (ref 3–16)
BUN BLDV-MCNC: 31 MG/DL (ref 7–20)
BUN BLDV-MCNC: 32 MG/DL (ref 7–20)
BUN BLDV-MCNC: 33 MG/DL (ref 7–20)
BUN BLDV-MCNC: 35 MG/DL (ref 7–20)
CALCIUM SERPL-MCNC: 8.9 MG/DL (ref 8.3–10.6)
CALCIUM SERPL-MCNC: 9 MG/DL (ref 8.3–10.6)
CALCIUM SERPL-MCNC: 9.4 MG/DL (ref 8.3–10.6)
CALCIUM SERPL-MCNC: 9.5 MG/DL (ref 8.3–10.6)
CHLORIDE BLD-SCNC: 100 MMOL/L (ref 99–110)
CHLORIDE BLD-SCNC: 104 MMOL/L (ref 99–110)
CHLORIDE BLD-SCNC: 96 MMOL/L (ref 99–110)
CHLORIDE BLD-SCNC: 99 MMOL/L (ref 99–110)
CO2: 20 MMOL/L (ref 21–32)
CO2: 24 MMOL/L (ref 21–32)
CO2: 25 MMOL/L (ref 21–32)
CO2: 27 MMOL/L (ref 21–32)
CREAT SERPL-MCNC: 1.1 MG/DL (ref 0.8–1.3)
CREAT SERPL-MCNC: 1.2 MG/DL (ref 0.8–1.3)
CREAT SERPL-MCNC: 1.2 MG/DL (ref 0.8–1.3)
CREAT SERPL-MCNC: 1.5 MG/DL (ref 0.8–1.3)
GFR AFRICAN AMERICAN: 57
GFR AFRICAN AMERICAN: >60
GFR NON-AFRICAN AMERICAN: 47
GFR NON-AFRICAN AMERICAN: >60
GLUCOSE BLD-MCNC: 138 MG/DL (ref 70–99)
GLUCOSE BLD-MCNC: 140 MG/DL (ref 70–99)
GLUCOSE BLD-MCNC: 145 MG/DL (ref 70–99)
GLUCOSE BLD-MCNC: 146 MG/DL (ref 70–99)
GLUCOSE BLD-MCNC: 173 MG/DL (ref 70–99)
GLUCOSE BLD-MCNC: 204 MG/DL (ref 70–99)
GLUCOSE BLD-MCNC: 205 MG/DL (ref 70–99)
GLUCOSE BLD-MCNC: 206 MG/DL (ref 70–99)
GLUCOSE BLD-MCNC: 217 MG/DL (ref 70–99)
GLUCOSE BLD-MCNC: 252 MG/DL (ref 70–99)
GLUCOSE BLD-MCNC: 289 MG/DL (ref 70–99)
GLUCOSE BLD-MCNC: 295 MG/DL (ref 70–99)
GLUCOSE BLD-MCNC: 386 MG/DL (ref 70–99)
PERFORMED ON: ABNORMAL
PHOSPHORUS: 2.6 MG/DL (ref 2.5–4.9)
PHOSPHORUS: 3.5 MG/DL (ref 2.5–4.9)
PHOSPHORUS: 3.7 MG/DL (ref 2.5–4.9)
PHOSPHORUS: 3.9 MG/DL (ref 2.5–4.9)
POTASSIUM SERPL-SCNC: 4.1 MMOL/L (ref 3.5–5.1)
POTASSIUM SERPL-SCNC: 4.2 MMOL/L (ref 3.5–5.1)
POTASSIUM SERPL-SCNC: 4.3 MMOL/L (ref 3.5–5.1)
POTASSIUM SERPL-SCNC: 4.5 MMOL/L (ref 3.5–5.1)
SODIUM BLD-SCNC: 130 MMOL/L (ref 136–145)
SODIUM BLD-SCNC: 133 MMOL/L (ref 136–145)
SODIUM BLD-SCNC: 139 MMOL/L (ref 136–145)
SODIUM BLD-SCNC: 139 MMOL/L (ref 136–145)

## 2019-07-29 PROCEDURE — 6360000002 HC RX W HCPCS: Performed by: PHYSICIAN ASSISTANT

## 2019-07-29 PROCEDURE — 6370000000 HC RX 637 (ALT 250 FOR IP): Performed by: PHYSICIAN ASSISTANT

## 2019-07-29 PROCEDURE — 6370000000 HC RX 637 (ALT 250 FOR IP): Performed by: INTERNAL MEDICINE

## 2019-07-29 PROCEDURE — 36415 COLL VENOUS BLD VENIPUNCTURE: CPT

## 2019-07-29 PROCEDURE — 99233 SBSQ HOSP IP/OBS HIGH 50: CPT | Performed by: INTERNAL MEDICINE

## 2019-07-29 PROCEDURE — 94640 AIRWAY INHALATION TREATMENT: CPT

## 2019-07-29 PROCEDURE — 97116 GAIT TRAINING THERAPY: CPT

## 2019-07-29 PROCEDURE — 97161 PT EVAL LOW COMPLEX 20 MIN: CPT

## 2019-07-29 PROCEDURE — 2060000000 HC ICU INTERMEDIATE R&B

## 2019-07-29 PROCEDURE — 6360000002 HC RX W HCPCS: Performed by: INTERNAL MEDICINE

## 2019-07-29 PROCEDURE — 97165 OT EVAL LOW COMPLEX 30 MIN: CPT

## 2019-07-29 PROCEDURE — 94761 N-INVAS EAR/PLS OXIMETRY MLT: CPT

## 2019-07-29 PROCEDURE — 80069 RENAL FUNCTION PANEL: CPT

## 2019-07-29 PROCEDURE — 2580000003 HC RX 258: Performed by: INTERNAL MEDICINE

## 2019-07-29 PROCEDURE — 99232 SBSQ HOSP IP/OBS MODERATE 35: CPT | Performed by: PHYSICIAN ASSISTANT

## 2019-07-29 PROCEDURE — 2580000003 HC RX 258: Performed by: PHYSICIAN ASSISTANT

## 2019-07-29 PROCEDURE — 2700000000 HC OXYGEN THERAPY PER DAY

## 2019-07-29 RX ORDER — FUROSEMIDE 10 MG/ML
40 INJECTION INTRAMUSCULAR; INTRAVENOUS 2 TIMES DAILY
Status: DISCONTINUED | OUTPATIENT
Start: 2019-07-29 | End: 2019-07-30 | Stop reason: HOSPADM

## 2019-07-29 RX ORDER — FUROSEMIDE 10 MG/ML
40 INJECTION INTRAMUSCULAR; INTRAVENOUS ONCE
Status: COMPLETED | OUTPATIENT
Start: 2019-07-29 | End: 2019-07-29

## 2019-07-29 RX ORDER — FUROSEMIDE 40 MG/1
40 TABLET ORAL DAILY
Status: DISCONTINUED | OUTPATIENT
Start: 2019-07-30 | End: 2019-07-29

## 2019-07-29 RX ADMIN — INSULIN GLARGINE 30 UNITS: 100 INJECTION, SOLUTION SUBCUTANEOUS at 08:34

## 2019-07-29 RX ADMIN — INSULIN LISPRO 2 UNITS: 100 INJECTION, SOLUTION INTRAVENOUS; SUBCUTANEOUS at 21:53

## 2019-07-29 RX ADMIN — SODIUM CHLORIDE: 9 INJECTION, SOLUTION INTRAVENOUS at 02:24

## 2019-07-29 RX ADMIN — FUROSEMIDE 40 MG: 10 INJECTION, SOLUTION INTRAMUSCULAR; INTRAVENOUS at 13:14

## 2019-07-29 RX ADMIN — FINASTERIDE 5 MG: 5 TABLET, FILM COATED ORAL at 08:31

## 2019-07-29 RX ADMIN — INSULIN HUMAN 6 UNITS: 100 INJECTION, SOLUTION PARENTERAL at 06:28

## 2019-07-29 RX ADMIN — METOPROLOL TARTRATE 25 MG: 25 TABLET ORAL at 08:31

## 2019-07-29 RX ADMIN — GABAPENTIN 600 MG: 300 CAPSULE ORAL at 08:31

## 2019-07-29 RX ADMIN — DILTIAZEM HYDROCHLORIDE 240 MG: 240 CAPSULE, COATED, EXTENDED RELEASE ORAL at 08:31

## 2019-07-29 RX ADMIN — GABAPENTIN 600 MG: 300 CAPSULE ORAL at 21:52

## 2019-07-29 RX ADMIN — INSULIN HUMAN 6 UNITS: 100 INJECTION, SOLUTION PARENTERAL at 10:17

## 2019-07-29 RX ADMIN — Medication 10 ML: at 08:32

## 2019-07-29 RX ADMIN — INSULIN GLARGINE 60 UNITS: 100 INJECTION, SOLUTION SUBCUTANEOUS at 21:52

## 2019-07-29 RX ADMIN — INSULIN LISPRO 6 UNITS: 100 INJECTION, SOLUTION INTRAVENOUS; SUBCUTANEOUS at 10:28

## 2019-07-29 RX ADMIN — Medication 10 ML: at 21:52

## 2019-07-29 RX ADMIN — DOXYCYCLINE HYCLATE 100 MG: 100 TABLET, COATED ORAL at 21:52

## 2019-07-29 RX ADMIN — IPRATROPIUM BROMIDE AND ALBUTEROL SULFATE 1 AMPULE: .5; 3 SOLUTION RESPIRATORY (INHALATION) at 20:28

## 2019-07-29 RX ADMIN — DOXYCYCLINE HYCLATE 100 MG: 100 TABLET, COATED ORAL at 08:31

## 2019-07-29 RX ADMIN — APIXABAN 5 MG: 5 TABLET, FILM COATED ORAL at 21:52

## 2019-07-29 RX ADMIN — INSULIN LISPRO 3 UNITS: 100 INJECTION, SOLUTION INTRAVENOUS; SUBCUTANEOUS at 17:13

## 2019-07-29 RX ADMIN — APIXABAN 5 MG: 5 TABLET, FILM COATED ORAL at 08:31

## 2019-07-29 RX ADMIN — TAMSULOSIN HYDROCHLORIDE 0.4 MG: 0.4 CAPSULE ORAL at 08:31

## 2019-07-29 RX ADMIN — IPRATROPIUM BROMIDE AND ALBUTEROL SULFATE 1 AMPULE: .5; 3 SOLUTION RESPIRATORY (INHALATION) at 06:53

## 2019-07-29 RX ADMIN — FUROSEMIDE 40 MG: 10 INJECTION, SOLUTION INTRAMUSCULAR; INTRAVENOUS at 17:25

## 2019-07-29 RX ADMIN — INSULIN HUMAN 9 UNITS: 100 INJECTION, SOLUTION PARENTERAL at 02:22

## 2019-07-29 RX ADMIN — PROCHLORPERAZINE MALEATE 10 MG: 10 TABLET, FILM COATED ORAL at 21:52

## 2019-07-29 RX ADMIN — METOPROLOL TARTRATE 25 MG: 25 TABLET ORAL at 21:52

## 2019-07-29 RX ADMIN — IPRATROPIUM BROMIDE AND ALBUTEROL SULFATE 1 AMPULE: .5; 3 SOLUTION RESPIRATORY (INHALATION) at 13:02

## 2019-07-29 NOTE — PROGRESS NOTES
Patient checked and sat 95% at rest on 5L per NC. Patient up and ambulated to doorway and back, remained 93% with exertion. Family at beside. No distress noted. Will continue to monitor.

## 2019-07-29 NOTE — PROGRESS NOTES
Hospitalist Progress Note      PCP: Denise Love MD    Date of Admission: 7/26/2019    Chief Complaint: daughter brought him due to hypoxia at home. Subjective:   Reports he is feeling well. BG is improved. He does feel swollen from all of the fluids. Currently on 5L NC O2--wants to go home     Medications:  Reviewed    Infusion Medications    dextrose      sodium chloride 125 mL/hr at 07/29/19 0224     Scheduled Medications    insulin lispro  0-9 Units Subcutaneous Nightly    insulin lispro  0-18 Units Subcutaneous TID WC    [START ON 7/30/2019] furosemide  40 mg Oral Daily    doxycycline hyclate  100 mg Oral 2 times per day    apixaban  5 mg Oral BID    diltiazem  240 mg Oral Daily    finasteride  5 mg Oral Daily    gabapentin  600 mg Oral BID    Leuprolide Acetate (3 Month)  22.5 mg Subcutaneous Daily    metoprolol tartrate  25 mg Oral BID    tamsulosin  0.4 mg Oral Daily    sodium chloride flush  10 mL Intravenous 2 times per day    prochlorperazine  10 mg Oral BID    ipratropium-albuterol  1 ampule Inhalation TID    insulin glargine  60 Units Subcutaneous BID     PRN Meds: glucose, dextrose, glucagon (rDNA), dextrose, melatonin, sodium chloride flush, magnesium hydroxide, ondansetron, acetaminophen, albuterol      Intake/Output Summary (Last 24 hours) at 7/29/2019 1427  Last data filed at 7/29/2019 1326  Gross per 24 hour   Intake 3980 ml   Output 1350 ml   Net 2630 ml       Physical Exam Performed:    /64   Pulse 103   Temp 98 °F (36.7 °C) (Oral)   Resp 18   Ht 6' (1.829 m)   Wt (!) 314 lb 3.2 oz (142.5 kg)   SpO2 90%   BMI 42.61 kg/m²         General appearance: No apparent distress appears stated age and cooperative. HEENT Normal cephalic, atraumatic without obvious deformity. Conjunctivae/corneas clear. Neck: Supple, No jugular venous distention/bruits. Lungs: globally diminished, no active wheezing or rhonchi.    Heart: Regular rate and irregular rhythm with

## 2019-07-29 NOTE — PROGRESS NOTES
Clarified with Stepan MARTINEZ regarding patient being on IV insulin. Patient changed to high dose sliding scale with Humalog. No distress noted. Will continue to monitor.

## 2019-07-29 NOTE — PROGRESS NOTES
Patient resting in bed, AM assessment completed. Lungs decreased. BS+. IVF at 125/hr. O2 at 6L per NC. Daughter staying at bedside. 30 units of lantus given due to BS of 140 this AM. No acute distress noted. Call light in reach. Will continue to monitor.

## 2019-07-30 VITALS
RESPIRATION RATE: 18 BRPM | OXYGEN SATURATION: 95 % | WEIGHT: 305.9 LBS | BODY MASS INDEX: 41.43 KG/M2 | HEART RATE: 88 BPM | TEMPERATURE: 97.8 F | DIASTOLIC BLOOD PRESSURE: 69 MMHG | HEIGHT: 72 IN | SYSTOLIC BLOOD PRESSURE: 120 MMHG

## 2019-07-30 LAB
ALBUMIN SERPL-MCNC: 3.7 G/DL (ref 3.4–5)
ALBUMIN SERPL-MCNC: 3.8 G/DL (ref 3.4–5)
ANION GAP SERPL CALCULATED.3IONS-SCNC: 13 MMOL/L (ref 3–16)
ANION GAP SERPL CALCULATED.3IONS-SCNC: 14 MMOL/L (ref 3–16)
BUN BLDV-MCNC: 36 MG/DL (ref 7–20)
BUN BLDV-MCNC: 37 MG/DL (ref 7–20)
CALCIUM SERPL-MCNC: 9.1 MG/DL (ref 8.3–10.6)
CALCIUM SERPL-MCNC: 9.6 MG/DL (ref 8.3–10.6)
CHLORIDE BLD-SCNC: 101 MMOL/L (ref 99–110)
CHLORIDE BLD-SCNC: 99 MMOL/L (ref 99–110)
CO2: 27 MMOL/L (ref 21–32)
CO2: 27 MMOL/L (ref 21–32)
CREAT SERPL-MCNC: 1.4 MG/DL (ref 0.8–1.3)
CREAT SERPL-MCNC: 1.4 MG/DL (ref 0.8–1.3)
GFR AFRICAN AMERICAN: >60
GFR AFRICAN AMERICAN: >60
GFR NON-AFRICAN AMERICAN: 51
GFR NON-AFRICAN AMERICAN: 51
GLUCOSE BLD-MCNC: 109 MG/DL (ref 70–99)
GLUCOSE BLD-MCNC: 137 MG/DL (ref 70–99)
GLUCOSE BLD-MCNC: 66 MG/DL (ref 70–99)
GLUCOSE BLD-MCNC: 95 MG/DL (ref 70–99)
GLUCOSE BLD-MCNC: 96 MG/DL (ref 70–99)
PERFORMED ON: ABNORMAL
PERFORMED ON: NORMAL
PERFORMED ON: NORMAL
PHOSPHORUS: 4.6 MG/DL (ref 2.5–4.9)
PHOSPHORUS: 5.3 MG/DL (ref 2.5–4.9)
POTASSIUM REFLEX MAGNESIUM: 3.8 MMOL/L (ref 3.5–5.1)
POTASSIUM SERPL-SCNC: 3.8 MMOL/L (ref 3.5–5.1)
POTASSIUM SERPL-SCNC: 3.9 MMOL/L (ref 3.5–5.1)
SODIUM BLD-SCNC: 139 MMOL/L (ref 136–145)
SODIUM BLD-SCNC: 142 MMOL/L (ref 136–145)

## 2019-07-30 PROCEDURE — 6370000000 HC RX 637 (ALT 250 FOR IP): Performed by: INTERNAL MEDICINE

## 2019-07-30 PROCEDURE — 6370000000 HC RX 637 (ALT 250 FOR IP): Performed by: PHYSICIAN ASSISTANT

## 2019-07-30 PROCEDURE — 2580000003 HC RX 258: Performed by: PHYSICIAN ASSISTANT

## 2019-07-30 PROCEDURE — 6360000002 HC RX W HCPCS: Performed by: PHYSICIAN ASSISTANT

## 2019-07-30 PROCEDURE — 80069 RENAL FUNCTION PANEL: CPT

## 2019-07-30 PROCEDURE — 99233 SBSQ HOSP IP/OBS HIGH 50: CPT | Performed by: INTERNAL MEDICINE

## 2019-07-30 PROCEDURE — 94761 N-INVAS EAR/PLS OXIMETRY MLT: CPT

## 2019-07-30 PROCEDURE — 94640 AIRWAY INHALATION TREATMENT: CPT

## 2019-07-30 PROCEDURE — 99238 HOSP IP/OBS DSCHRG MGMT 30/<: CPT | Performed by: INTERNAL MEDICINE

## 2019-07-30 PROCEDURE — 36415 COLL VENOUS BLD VENIPUNCTURE: CPT

## 2019-07-30 PROCEDURE — 2700000000 HC OXYGEN THERAPY PER DAY

## 2019-07-30 RX ORDER — METOPROLOL TARTRATE 50 MG/1
25 TABLET, FILM COATED ORAL 2 TIMES DAILY
COMMUNITY
Start: 2019-07-30

## 2019-07-30 RX ORDER — FUROSEMIDE 40 MG/1
40 TABLET ORAL 2 TIMES DAILY
Qty: 60 TABLET | Refills: 1 | Status: SHIPPED | OUTPATIENT
Start: 2019-07-30 | End: 2021-10-16 | Stop reason: ALTCHOICE

## 2019-07-30 RX ORDER — DOXYCYCLINE HYCLATE 100 MG
100 TABLET ORAL EVERY 12 HOURS SCHEDULED
Qty: 8 TABLET | Refills: 0 | Status: SHIPPED | OUTPATIENT
Start: 2019-07-30 | End: 2019-08-03

## 2019-07-30 RX ADMIN — FINASTERIDE 5 MG: 5 TABLET, FILM COATED ORAL at 08:23

## 2019-07-30 RX ADMIN — APIXABAN 5 MG: 5 TABLET, FILM COATED ORAL at 08:23

## 2019-07-30 RX ADMIN — IPRATROPIUM BROMIDE AND ALBUTEROL SULFATE 1 AMPULE: .5; 3 SOLUTION RESPIRATORY (INHALATION) at 06:45

## 2019-07-30 RX ADMIN — DOXYCYCLINE HYCLATE 100 MG: 100 TABLET, COATED ORAL at 08:23

## 2019-07-30 RX ADMIN — INSULIN GLARGINE 30 UNITS: 100 INJECTION, SOLUTION SUBCUTANEOUS at 08:30

## 2019-07-30 RX ADMIN — GABAPENTIN 600 MG: 300 CAPSULE ORAL at 08:23

## 2019-07-30 RX ADMIN — FUROSEMIDE 40 MG: 10 INJECTION, SOLUTION INTRAMUSCULAR; INTRAVENOUS at 08:23

## 2019-07-30 RX ADMIN — TAMSULOSIN HYDROCHLORIDE 0.4 MG: 0.4 CAPSULE ORAL at 08:23

## 2019-07-30 RX ADMIN — DILTIAZEM HYDROCHLORIDE 240 MG: 240 CAPSULE, COATED, EXTENDED RELEASE ORAL at 08:22

## 2019-07-30 RX ADMIN — Medication 10 ML: at 08:23

## 2019-07-30 RX ADMIN — METOPROLOL TARTRATE 25 MG: 25 TABLET ORAL at 08:23

## 2019-07-30 NOTE — PROGRESS NOTES
Patient and daughter given discharge instructions and prescriptions at this time. Both state understanding. IV and monitor removed. Discharge request placed per transport.

## 2019-07-30 NOTE — PROGRESS NOTES
Patient sitting up in chair, CPAP in place. Tolerating well. Diuresing well overnight. He denies any needs at this time. Will continue to monitor.

## 2019-07-30 NOTE — PROGRESS NOTES
exertion;Irregular pattern;or RR less than 6 = 2    Breath Sounds: Diminshed bilaterally and/or crackles = 2    Sputum   ,  , Sputum How Obtained: None  Cough: Strong, spontaneous, non-productive = 0    Vital Signs   /68   Pulse 92   Temp 97.9 °F (36.6 °C) (Oral)   Resp 18   Ht 6' (1.829 m)   Wt (!) 314 lb 3.2 oz (142.5 kg)   SpO2 91%   BMI 42.61 kg/m²   SPO2 (COPD values may differ): 86-87% on room air or greater than 92% on FiO2 35- 50% = 3    Peak Flow (asthma only): not applicable = 0    RSI: 0-85 = TID (three times daily) and Q4hr PRN for dyspnea        Plan       Goals: medication delivery, mobilize retained secretions, volume expansion and improve oxygenation    Patient/caregiver was educated on the proper method of use for Respiratory Care Devices:  Yes      Level of patient/caregiver understanding able to:   ? Verbalize understanding   ? Demonstrate understanding       ? Teach back        ? Needs reinforcement       ? No available caregiver               ? Other:     Response to education:  Excellent     Is patient being placed on Home Treatment Regimen? No     Does the patient have everything they need prior to discharge? NA     Comments: pt assessed & chart reviewed    Plan of Care: Maintain current regimen    Electronically signed by Daniela Mancini RCP on 7/29/2019 at 8:54 PM    Respiratory Protocol Guidelines     1. Assessment and treatment by Respiratory Therapy will be initiated for medication and therapeutic interventions upon initiation of aerosolized medication. 2. Physician will be contacted for respiratory rate (RR) greater than 35 breaths per minute. Therapy will be held for heart rate (HR) greater than 140 beats per minute, pending direction from physician. 3. Bronchodilators will be administered via Metered Dose Inhaler (MDI) with spacer when the following criteria are met:  a.  Alert and cooperative     b. HR < 140 bpm  c. RR < 30 bpm                d. Can demonstrate a 2-3 second inspiratory hold  4. Bronchodilators will be administered via Hand Held Nebulizer [facility]) to patients when ANY of the following criteria are met  a. Incognizant or uncooperative          b. Patients treated with HHN at Home        c. Unable to demonstrate proper use of MDI with spacer     d. RR > 30 bpm   5. Bronchodilators will be delivered via Metered Dose Inhaler (MDI), HHN, Aerogen to intubated patients on mechanical ventilation. 6. Inhalation medication orders will be delivered and/or substituted as outlined below. Aerosolized Medications Ordering and Administration Guidelines:    1. All Medications will be ordered by a physician, and their frequency and/or modality will be adjusted as defined by the patients Respiratory Severity Index (RSI) score. 2. If the patient does not have documented COPD, consider discontinuing anticholinergics when RSI is less than 9.  3. If the bronchospasm worsens (increased RSI), then the bronchodilator frequency can be increased to a maximum of every 4 hours. If greater than every 4 hours is required, the physician will be contacted. 4. If the bronchospasm improves, the frequency of the bronchodilator can be decreased, based on the patient's RSI, but not less than home treatment regimen frequency. 5. Bronchodilator(s) will be discontinued if patient has a RSI less than 9 and has received no scheduled or as needed treatment for 72  Hrs. Patients Ordered on a Mucolytic Agent:    1. Must always be administered with a bronchodilator. 2. Discontinue if patient experiences worsened bronchospasm, or secretions have lessened to the point that the patient is able to clear them with a cough. Anti-inflammatory and Combination Medications:    1.  If the patient lacks prior history of lung disease, is not using inhaled anti-inflammatory medication at home, and lacks wheezing by examination or by history for at least 24 hours, contact physician for possible discontinuation.

## 2019-08-09 ENCOUNTER — HOSPITAL ENCOUNTER (EMERGENCY)
Age: 64
Discharge: HOME | End: 2019-08-09
Payer: MEDICARE

## 2019-08-09 VITALS
SYSTOLIC BLOOD PRESSURE: 119 MMHG | HEART RATE: 96 BPM | OXYGEN SATURATION: 94 % | TEMPERATURE: 98 F | DIASTOLIC BLOOD PRESSURE: 74 MMHG | RESPIRATION RATE: 16 BRPM

## 2019-08-09 DIAGNOSIS — J96.10 CHRONIC RESPIRATORY FAILURE, UNSPECIFIED WHETHER WITH HYPOXIA OR HYPERCAPNIA (HCC): Primary | ICD-10-CM

## 2019-08-09 LAB
BASE EXCESS ARTERIAL: 2.2 MMOL/L (ref -3–3)
CARBOXYHEMOGLOBIN ARTERIAL: 4.8 % (ref 0–1.5)
HCO3 ARTERIAL: 27.3 MMOL/L (ref 21–29)
HEMOGLOBIN, ART, EXTENDED: 13.4 G/DL (ref 13.5–17.5)
METHEMOGLOBIN ARTERIAL: 0.2 %
O2 CONTENT ARTERIAL: 18 ML/DL
O2 SAT, ARTERIAL: 94.8 %
O2 THERAPY: ABNORMAL
PCO2 ARTERIAL: 44.4 MMHG (ref 35–45)
PH ARTERIAL: 7.41 (ref 7.35–7.45)
PO2 ARTERIAL: 73.2 MMHG (ref 75–108)
TCO2 ARTERIAL: 28.7 MMOL/L

## 2019-08-09 PROCEDURE — 82803 BLOOD GASES ANY COMBINATION: CPT

## 2019-08-09 PROCEDURE — 99282 EMERGENCY DEPT VISIT SF MDM: CPT

## 2019-08-10 ASSESSMENT — ENCOUNTER SYMPTOMS
NAUSEA: 0
SHORTNESS OF BREATH: 1
VOMITING: 0
COUGH: 1
BACK PAIN: 1

## 2019-08-10 NOTE — ED PROVIDER NOTES
SYNDROME, INTRACRANIAL HEMORRHAGE, MALIGNANT DYSRHYTHMIA, MENINGITIS, PNEUMONIA, PULMONARY EMBOLISM, SEPSIS, SUBARACHNOID HEMORRHAGE, SUBDURAL HEMATOMA, STROKE, or URINARY TRACT INFECTION, thus I consider the discharge disposition reasonable. Tom Wagner [age][address] 64[address] and I have discussed the diagnosis and risks, and we agree with discharging home to follow-up with their primary doctor. We also discussed returning to the Emergency Department immediately if new or worsening symptoms occur. We have discussed the symptoms which are most concerning (e.g., changing or worsening pain, weakness, vomiting, fever) that necessitate immediate return. FINAL IMPRESSION      1.  Chronic respiratory failure, unspecified whether with hypoxia or hypercapnia [facility])          DISPOSITION/PLAN   DISPOSITION Decision To Discharge 08/09/2019 02:49:21 PM      PATIENT REFERRED TO:  Satya Casas MD  [id number] [phone]                (Please note that portions of this note were completed with a voice recognition program.  Efforts were made to edit the dictations but occasionally words are mis-transcribed.)    Kenneth Hunter PA-C (electronically signed)          Kenneth Hunter PA-C  08/10/19 1125

## 2019-08-13 ENCOUNTER — TELEPHONE (OUTPATIENT)
Dept: PULMONOLOGY | Age: 64
End: 2019-08-13

## 2019-11-06 ENCOUNTER — OFFICE VISIT (OUTPATIENT)
Dept: PULMONOLOGY | Age: 64
End: 2019-11-06
Payer: MEDICARE

## 2019-11-06 VITALS
BODY MASS INDEX: 39.36 KG/M2 | OXYGEN SATURATION: 96 % | DIASTOLIC BLOOD PRESSURE: 62 MMHG | WEIGHT: 290.6 LBS | SYSTOLIC BLOOD PRESSURE: 122 MMHG | TEMPERATURE: 97.6 F | HEART RATE: 94 BPM | RESPIRATION RATE: 18 BRPM | HEIGHT: 72 IN

## 2019-11-06 DIAGNOSIS — Z72.0 TOBACCO ABUSE: ICD-10-CM

## 2019-11-06 DIAGNOSIS — J96.11 CHRONIC RESPIRATORY FAILURE WITH HYPOXIA (HCC): Primary | ICD-10-CM

## 2019-11-06 DIAGNOSIS — J44.9 COPD, SEVERE (HCC): ICD-10-CM

## 2019-11-06 PROCEDURE — G8417 CALC BMI ABV UP PARAM F/U: HCPCS | Performed by: INTERNAL MEDICINE

## 2019-11-06 PROCEDURE — G8427 DOCREV CUR MEDS BY ELIG CLIN: HCPCS | Performed by: INTERNAL MEDICINE

## 2019-11-06 PROCEDURE — 99214 OFFICE O/P EST MOD 30 MIN: CPT | Performed by: INTERNAL MEDICINE

## 2019-11-06 PROCEDURE — 4004F PT TOBACCO SCREEN RCVD TLK: CPT | Performed by: INTERNAL MEDICINE

## 2019-11-06 PROCEDURE — G8926 SPIRO NO PERF OR DOC: HCPCS | Performed by: INTERNAL MEDICINE

## 2019-11-06 PROCEDURE — 3017F COLORECTAL CA SCREEN DOC REV: CPT | Performed by: INTERNAL MEDICINE

## 2019-11-06 PROCEDURE — 4040F PNEUMOC VAC/ADMIN/RCVD: CPT | Performed by: INTERNAL MEDICINE

## 2019-11-06 PROCEDURE — G8484 FLU IMMUNIZE NO ADMIN: HCPCS | Performed by: INTERNAL MEDICINE

## 2019-11-06 PROCEDURE — 1123F ACP DISCUSS/DSCN MKR DOCD: CPT | Performed by: INTERNAL MEDICINE

## 2019-11-06 PROCEDURE — 3023F SPIROM DOC REV: CPT | Performed by: INTERNAL MEDICINE

## 2020-05-11 ENCOUNTER — TELEPHONE (OUTPATIENT)
Dept: PULMONOLOGY | Age: 65
End: 2020-05-11

## 2020-05-11 NOTE — TELEPHONE ENCOUNTER
Within this Telehealth Consent, the terms you and yours refer to the person using the Telehealth Service (Service), or in the case of a use of the Service by or on behalf of a minor, you and yours refer to and include (i) the parent or legal guardian who provides consent to the use of the Service by such minor or uses the Service on behalf of such minor, and (ii) the minor for whom consent is being provided or on whose behalf the Service is being utilized. When using Service, you will be consulting with your health care providers via the use of Telehealth.   Telehealth involves the delivery of healthcare services using electronic communications, information technology or other means between a healthcare provider and a patient who are not in the same physical location. Telehealth may be used for diagnosis, treatment, follow-up and/or patient education, and may include, but is not limited to, one or more of the following:    Electronic transmission of medical records, photo images, personal health information or other data between a patient and a healthcare provider    Interactions between a patient and healthcare provider via audio, video and/or data communications    Use of output data from medical devices, sound and video files    Anticipated Benefits   The use of Telehealth by your Provider(s) through the Service may have the following possible benefits:    Making it easier and more efficient for you to access medical care and treatment for the conditions treated by such Provider(s) utilizing the Service    Allowing you to obtain medical care and treatment by Provider(s) at times that are convenient for you    Enabling you to interact with Provider(s) without the necessity of an in-office appointment     Possible Risks   While the use of Telehealth can provide potential benefits for you, there are also potential risks associated with the use of Telehealth.  These risks include, but may not be the provision of medical care and treatment via Telehealth and the Service and you may not be able to receive diagnosis and/or treatment through the Service for every condition for which you seek diagnosis and/or treatment. 11. There are potential risks to the use of Telehealth, including but not limited to the risks described in this Telehealth Consent. 12. Your Provider(s) have discussed the use of Telehealth and the Service with you, including the benefits and risks of such and you have provided oral consent to your Provider(s) for the use of Telehealth and the Service. 15. You understand that it is your duty to provide your Provider(s) truthful, accurate and complete information, including all relevant information regarding care that you may have received or may be receiving from other healthcare providers outside of the Service. 14. You understand that each of your Provider(s) may determine in his or sole discretion that your condition is not suitable for diagnosis and/or treatment using the Service, and that you may need to seek medical care and treatment a specialist or other healthcare provider, outside of the Service. 15. You understand that you are fully responsible for payment for all services provided by Provider(s) or through use of the Service and that you may not be able to use third-party insurance. 16. You represent that (a) you have read this Telehealth Consent carefully, (b) you understand the risks and benefits of the Service and the use of Telehealth in the medical care and treatment provided to you by Provider(s) using the Service, and (c) you have the legal capacity and authority to provide this consent for yourself and/or the minor for which you are consenting under applicable federal and state laws, including laws relating to the age of [de-identified] and/or parental/guardian consent.    17. You give your informed consent to the use of Telehealth by Provider(s) using the Service under the terms described in the Terms of Service and this Telehealth Consent. The patient was read the following statement and has consented to the visit as of 5/11/20. The patient has been scheduled for their first telehealth visit on 5/12/20 with Dr Bethel Girard.

## 2020-05-12 ENCOUNTER — VIRTUAL VISIT (OUTPATIENT)
Dept: PULMONOLOGY | Age: 65
End: 2020-05-12
Payer: MEDICARE

## 2020-05-12 PROCEDURE — 99442 PR PHYS/QHP TELEPHONE EVALUATION 11-20 MIN: CPT | Performed by: INTERNAL MEDICINE

## 2020-05-12 RX ORDER — ALBUTEROL SULFATE 2.5 MG/3ML
2.5 SOLUTION RESPIRATORY (INHALATION) EVERY 6 HOURS PRN
COMMUNITY
End: 2021-10-16

## 2020-05-12 RX ORDER — ALBUTEROL SULFATE 90 UG/1
2 AEROSOL, METERED RESPIRATORY (INHALATION) EVERY 6 HOURS PRN
COMMUNITY
End: 2021-10-16

## 2020-11-09 ENCOUNTER — TELEPHONE (OUTPATIENT)
Dept: PULMONOLOGY | Age: 65
End: 2020-11-09

## 2020-11-09 NOTE — TELEPHONE ENCOUNTER
Patient called cancelled [facility] 5/12/20    ASSESSMENT:  · Severe COPD  · Chronic hypoxic respiratory failure on home O2  · Tobacco abuse  · Chronic cor pulmonale with RV dysfunction and pulmonary htn  · Right paratracheal lymph node could be biopsied if recommended by his oncologist  · MARIA ELENA on CPAP for 30 years followed at [address]  · Afib on Eliquis  · Stage 4 Prostate [facility];  metastatic to bladder and possibly a pelvic lymph node s/p radiation.  Now on leupron.     PLAN:   · Supplemental O2 at 3lpm on exertion  · Pt did no start Incruse due to cost  · Continue PRN albuterol Nebs - pt states he is not need ing this  · Tobacco cessation  · Chronic AC on Eliquis  · F/u in 6 months

## 2021-10-16 ENCOUNTER — APPOINTMENT (OUTPATIENT)
Dept: CT IMAGING | Age: 66
DRG: 189 | End: 2021-10-16
Payer: MEDICARE

## 2021-10-16 ENCOUNTER — APPOINTMENT (OUTPATIENT)
Dept: GENERAL RADIOLOGY | Age: 66
DRG: 189 | End: 2021-10-16
Payer: MEDICARE

## 2021-10-16 ENCOUNTER — HOSPITAL ENCOUNTER (INPATIENT)
Age: 66
LOS: 3 days | Discharge: HOME | DRG: 189 | End: 2021-10-19
Attending: EMERGENCY MEDICINE | Admitting: INTERNAL MEDICINE
Payer: MEDICARE

## 2021-10-16 DIAGNOSIS — J44.1 COPD EXACERBATION (HCC): Primary | ICD-10-CM

## 2021-10-16 LAB
A/G RATIO: 1.1 (ref 1.1–2.2)
ALBUMIN SERPL-MCNC: 3.9 G/DL (ref 3.4–5)
ALP BLD-CCNC: 88 U/L (ref 40–129)
ALT SERPL-CCNC: 13 U/L (ref 10–40)
ANION GAP SERPL CALCULATED.3IONS-SCNC: 10 MMOL/L (ref 3–16)
AST SERPL-CCNC: 17 U/L (ref 15–37)
BASE EXCESS VENOUS: 4.7 MMOL/L (ref -3–3)
BASOPHILS ABSOLUTE: 0.1 K/UL (ref 0–0.2)
BASOPHILS RELATIVE PERCENT: 1 %
BILIRUB SERPL-MCNC: 1.3 MG/DL (ref 0–1)
BUN BLDV-MCNC: 14 MG/DL (ref 7–20)
CALCIUM SERPL-MCNC: 8.6 MG/DL (ref 8.3–10.6)
CARBOXYHEMOGLOBIN: 5 % (ref 0–1.5)
CHLORIDE BLD-SCNC: 96 MMOL/L (ref 99–110)
CO2: 30 MMOL/L (ref 21–32)
CREAT SERPL-MCNC: 1.2 MG/DL (ref 0.8–1.3)
EKG ATRIAL RATE: 64 BPM
EKG DIAGNOSIS: NORMAL
EKG Q-T INTERVAL: 368 MS
EKG QRS DURATION: 104 MS
EKG QTC CALCULATION (BAZETT): 477 MS
EKG R AXIS: 168 DEGREES
EKG T AXIS: 61 DEGREES
EKG VENTRICULAR RATE: 101 BPM
EOSINOPHILS ABSOLUTE: 0.1 K/UL (ref 0–0.6)
EOSINOPHILS RELATIVE PERCENT: 2.1 %
GFR AFRICAN AMERICAN: >60
GFR NON-AFRICAN AMERICAN: >60
GLOBULIN: 3.6 G/DL
GLUCOSE BLD-MCNC: 138 MG/DL (ref 70–99)
GLUCOSE BLD-MCNC: 170 MG/DL (ref 70–99)
HCO3 VENOUS: 34.7 MMOL/L (ref 23–29)
HCT VFR BLD CALC: 49 % (ref 40.5–52.5)
HEMOGLOBIN: 15.3 G/DL (ref 13.5–17.5)
LACTIC ACID: 1 MMOL/L (ref 0.4–2)
LYMPHOCYTES ABSOLUTE: 1.2 K/UL (ref 1–5.1)
LYMPHOCYTES RELATIVE PERCENT: 21.4 %
MCH RBC QN AUTO: 30.5 PG (ref 26–34)
MCHC RBC AUTO-ENTMCNC: 31.2 G/DL (ref 31–36)
MCV RBC AUTO: 97.7 FL (ref 80–100)
METHEMOGLOBIN VENOUS: 0 %
MONOCYTES ABSOLUTE: 0.3 K/UL (ref 0–1.3)
MONOCYTES RELATIVE PERCENT: 6 %
NEUTROPHILS ABSOLUTE: 3.8 K/UL (ref 1.7–7.7)
NEUTROPHILS RELATIVE PERCENT: 69.5 %
O2 CONTENT, VEN: 16 VOL %
O2 SAT, VEN: 73 %
O2 THERAPY: ABNORMAL
PCO2, VEN: 76.6 MMHG (ref 40–50)
PDW BLD-RTO: 17.6 % (ref 12.4–15.4)
PERFORMED ON: ABNORMAL
PH VENOUS: 7.27 (ref 7.35–7.45)
PLATELET # BLD: 122 K/UL (ref 135–450)
PMV BLD AUTO: 8.9 FL (ref 5–10.5)
PO2, VEN: 44.9 MMHG (ref 25–40)
POTASSIUM REFLEX MAGNESIUM: 4.4 MMOL/L (ref 3.5–5.1)
PRO-BNP: 1721 PG/ML (ref 0–124)
RBC # BLD: 5.01 M/UL (ref 4.2–5.9)
SARS-COV-2, NAAT: NOT DETECTED
SODIUM BLD-SCNC: 136 MMOL/L (ref 136–145)
TCO2 CALC VENOUS: 37 MMOL/L
TOTAL PROTEIN: 7.5 G/DL (ref 6.4–8.2)
TROPONIN: <0.01 NG/ML
WBC # BLD: 5.5 K/UL (ref 4–11)

## 2021-10-16 PROCEDURE — 6370000000 HC RX 637 (ALT 250 FOR IP): Performed by: INTERNAL MEDICINE

## 2021-10-16 PROCEDURE — 87040 BLOOD CULTURE FOR BACTERIA: CPT

## 2021-10-16 PROCEDURE — 84484 ASSAY OF TROPONIN QUANT: CPT

## 2021-10-16 PROCEDURE — 83880 ASSAY OF NATRIURETIC PEPTIDE: CPT

## 2021-10-16 PROCEDURE — 83605 ASSAY OF LACTIC ACID: CPT

## 2021-10-16 PROCEDURE — 99285 EMERGENCY DEPT VISIT HI MDM: CPT

## 2021-10-16 PROCEDURE — 6370000000 HC RX 637 (ALT 250 FOR IP): Performed by: PHYSICIAN ASSISTANT

## 2021-10-16 PROCEDURE — 82803 BLOOD GASES ANY COMBINATION: CPT

## 2021-10-16 PROCEDURE — 2060000000 HC ICU INTERMEDIATE R&B

## 2021-10-16 PROCEDURE — 71045 X-RAY EXAM CHEST 1 VIEW: CPT

## 2021-10-16 PROCEDURE — 96374 THER/PROPH/DIAG INJ IV PUSH: CPT

## 2021-10-16 PROCEDURE — 85025 COMPLETE CBC W/AUTO DIFF WBC: CPT

## 2021-10-16 PROCEDURE — 6360000002 HC RX W HCPCS: Performed by: PHYSICIAN ASSISTANT

## 2021-10-16 PROCEDURE — 93010 ELECTROCARDIOGRAM REPORT: CPT | Performed by: INTERNAL MEDICINE

## 2021-10-16 PROCEDURE — 80053 COMPREHEN METABOLIC PANEL: CPT

## 2021-10-16 PROCEDURE — 93005 ELECTROCARDIOGRAM TRACING: CPT | Performed by: PHYSICIAN ASSISTANT

## 2021-10-16 PROCEDURE — 6360000004 HC RX CONTRAST MEDICATION: Performed by: PHYSICIAN ASSISTANT

## 2021-10-16 PROCEDURE — 2580000003 HC RX 258: Performed by: INTERNAL MEDICINE

## 2021-10-16 PROCEDURE — 70450 CT HEAD/BRAIN W/O DYE: CPT

## 2021-10-16 PROCEDURE — 94640 AIRWAY INHALATION TREATMENT: CPT

## 2021-10-16 PROCEDURE — 87635 SARS-COV-2 COVID-19 AMP PRB: CPT

## 2021-10-16 PROCEDURE — 71260 CT THORAX DX C+: CPT

## 2021-10-16 PROCEDURE — 6360000002 HC RX W HCPCS: Performed by: INTERNAL MEDICINE

## 2021-10-16 RX ORDER — BICALUTAMIDE 50 MG/1
50 TABLET, FILM COATED ORAL DAILY
Status: DISCONTINUED | OUTPATIENT
Start: 2021-10-16 | End: 2021-10-17

## 2021-10-16 RX ORDER — ALBUTEROL SULFATE 2.5 MG/3ML
2.5 SOLUTION RESPIRATORY (INHALATION) ONCE
Status: COMPLETED | OUTPATIENT
Start: 2021-10-16 | End: 2021-10-16

## 2021-10-16 RX ORDER — MIRTAZAPINE 15 MG/1
7.5 TABLET, FILM COATED ORAL NIGHTLY
Status: DISCONTINUED | OUTPATIENT
Start: 2021-10-16 | End: 2021-10-19 | Stop reason: HOSPADM

## 2021-10-16 RX ORDER — ACETAMINOPHEN 650 MG/1
650 SUPPOSITORY RECTAL EVERY 6 HOURS PRN
Status: DISCONTINUED | OUTPATIENT
Start: 2021-10-16 | End: 2021-10-19 | Stop reason: HOSPADM

## 2021-10-16 RX ORDER — NICOTINE POLACRILEX 4 MG
15 LOZENGE BUCCAL PRN
Status: DISCONTINUED | OUTPATIENT
Start: 2021-10-16 | End: 2021-10-19 | Stop reason: HOSPADM

## 2021-10-16 RX ORDER — ONDANSETRON 4 MG/1
4 TABLET, ORALLY DISINTEGRATING ORAL EVERY 8 HOURS PRN
Status: DISCONTINUED | OUTPATIENT
Start: 2021-10-16 | End: 2021-10-19 | Stop reason: HOSPADM

## 2021-10-16 RX ORDER — METHYLPREDNISOLONE SODIUM SUCCINATE 40 MG/ML
40 INJECTION, POWDER, LYOPHILIZED, FOR SOLUTION INTRAMUSCULAR; INTRAVENOUS EVERY 6 HOURS
Status: DISCONTINUED | OUTPATIENT
Start: 2021-10-17 | End: 2021-10-17

## 2021-10-16 RX ORDER — ONDANSETRON 2 MG/ML
4 INJECTION INTRAMUSCULAR; INTRAVENOUS EVERY 6 HOURS PRN
Status: DISCONTINUED | OUTPATIENT
Start: 2021-10-16 | End: 2021-10-19 | Stop reason: HOSPADM

## 2021-10-16 RX ORDER — SODIUM CHLORIDE 9 MG/ML
25 INJECTION, SOLUTION INTRAVENOUS PRN
Status: DISCONTINUED | OUTPATIENT
Start: 2021-10-16 | End: 2021-10-19 | Stop reason: HOSPADM

## 2021-10-16 RX ORDER — FINASTERIDE 5 MG/1
5 TABLET, FILM COATED ORAL DAILY
Status: DISCONTINUED | OUTPATIENT
Start: 2021-10-16 | End: 2021-10-19 | Stop reason: HOSPADM

## 2021-10-16 RX ORDER — IPRATROPIUM BROMIDE AND ALBUTEROL SULFATE 2.5; .5 MG/3ML; MG/3ML
1 SOLUTION RESPIRATORY (INHALATION) ONCE
Status: COMPLETED | OUTPATIENT
Start: 2021-10-16 | End: 2021-10-16

## 2021-10-16 RX ORDER — PREDNISONE 20 MG/1
40 TABLET ORAL DAILY
Status: DISCONTINUED | OUTPATIENT
Start: 2021-10-19 | End: 2021-10-17

## 2021-10-16 RX ORDER — FUROSEMIDE 10 MG/ML
40 INJECTION INTRAMUSCULAR; INTRAVENOUS ONCE
Status: COMPLETED | OUTPATIENT
Start: 2021-10-16 | End: 2021-10-16

## 2021-10-16 RX ORDER — GABAPENTIN 800 MG/1
800 TABLET ORAL 3 TIMES DAILY
COMMUNITY

## 2021-10-16 RX ORDER — PREDNISONE 20 MG/1
60 TABLET ORAL ONCE
Status: COMPLETED | OUTPATIENT
Start: 2021-10-16 | End: 2021-10-16

## 2021-10-16 RX ORDER — SODIUM CHLORIDE 0.9 % (FLUSH) 0.9 %
5-40 SYRINGE (ML) INJECTION EVERY 12 HOURS SCHEDULED
Status: DISCONTINUED | OUTPATIENT
Start: 2021-10-16 | End: 2021-10-19 | Stop reason: HOSPADM

## 2021-10-16 RX ORDER — IPRATROPIUM BROMIDE AND ALBUTEROL SULFATE 2.5; .5 MG/3ML; MG/3ML
1 SOLUTION RESPIRATORY (INHALATION) EVERY 4 HOURS PRN
Status: DISCONTINUED | OUTPATIENT
Start: 2021-10-16 | End: 2021-10-19 | Stop reason: HOSPADM

## 2021-10-16 RX ORDER — TAMSULOSIN HYDROCHLORIDE 0.4 MG/1
0.8 CAPSULE ORAL DAILY
Status: DISCONTINUED | OUTPATIENT
Start: 2021-10-16 | End: 2021-10-19 | Stop reason: HOSPADM

## 2021-10-16 RX ORDER — SODIUM CHLORIDE 0.9 % (FLUSH) 0.9 %
5-40 SYRINGE (ML) INJECTION PRN
Status: DISCONTINUED | OUTPATIENT
Start: 2021-10-16 | End: 2021-10-19 | Stop reason: HOSPADM

## 2021-10-16 RX ORDER — ALOGLIPTIN 12.5 MG/1
12.5 TABLET, FILM COATED ORAL DAILY
Status: DISCONTINUED | OUTPATIENT
Start: 2021-10-16 | End: 2021-10-19 | Stop reason: HOSPADM

## 2021-10-16 RX ORDER — IPRATROPIUM BROMIDE AND ALBUTEROL SULFATE 2.5; .5 MG/3ML; MG/3ML
1 SOLUTION RESPIRATORY (INHALATION)
Status: DISCONTINUED | OUTPATIENT
Start: 2021-10-16 | End: 2021-10-17

## 2021-10-16 RX ORDER — IPRATROPIUM BROMIDE AND ALBUTEROL SULFATE 2.5; .5 MG/3ML; MG/3ML
1 SOLUTION RESPIRATORY (INHALATION)
Status: DISCONTINUED | OUTPATIENT
Start: 2021-10-16 | End: 2021-10-16

## 2021-10-16 RX ORDER — ACETAMINOPHEN 325 MG/1
650 TABLET ORAL EVERY 6 HOURS PRN
Status: DISCONTINUED | OUTPATIENT
Start: 2021-10-16 | End: 2021-10-19 | Stop reason: HOSPADM

## 2021-10-16 RX ORDER — POLYETHYLENE GLYCOL 3350 17 G/17G
17 POWDER, FOR SOLUTION ORAL DAILY PRN
Status: DISCONTINUED | OUTPATIENT
Start: 2021-10-16 | End: 2021-10-19 | Stop reason: HOSPADM

## 2021-10-16 RX ORDER — DEXTROSE MONOHYDRATE 50 MG/ML
100 INJECTION, SOLUTION INTRAVENOUS PRN
Status: DISCONTINUED | OUTPATIENT
Start: 2021-10-16 | End: 2021-10-19 | Stop reason: HOSPADM

## 2021-10-16 RX ORDER — INSULIN GLARGINE 100 [IU]/ML
20 INJECTION, SOLUTION SUBCUTANEOUS NIGHTLY
Status: DISCONTINUED | OUTPATIENT
Start: 2021-10-16 | End: 2021-10-19 | Stop reason: HOSPADM

## 2021-10-16 RX ORDER — DEXTROSE MONOHYDRATE 25 G/50ML
12.5 INJECTION, SOLUTION INTRAVENOUS PRN
Status: DISCONTINUED | OUTPATIENT
Start: 2021-10-16 | End: 2021-10-19 | Stop reason: HOSPADM

## 2021-10-16 RX ORDER — FUROSEMIDE 10 MG/ML
20 INJECTION INTRAMUSCULAR; INTRAVENOUS ONCE
Status: COMPLETED | OUTPATIENT
Start: 2021-10-17 | End: 2021-10-17

## 2021-10-16 RX ORDER — DILTIAZEM HYDROCHLORIDE 240 MG/1
240 CAPSULE, COATED, EXTENDED RELEASE ORAL DAILY
Status: DISCONTINUED | OUTPATIENT
Start: 2021-10-16 | End: 2021-10-19 | Stop reason: HOSPADM

## 2021-10-16 RX ORDER — ROSUVASTATIN CALCIUM 10 MG/1
5 TABLET, COATED ORAL DAILY
Status: DISCONTINUED | OUTPATIENT
Start: 2021-10-16 | End: 2021-10-19 | Stop reason: HOSPADM

## 2021-10-16 RX ORDER — GABAPENTIN 400 MG/1
800 CAPSULE ORAL 3 TIMES DAILY
Status: DISCONTINUED | OUTPATIENT
Start: 2021-10-16 | End: 2021-10-19 | Stop reason: HOSPADM

## 2021-10-16 RX ADMIN — IOPAMIDOL 85 ML: 755 INJECTION, SOLUTION INTRAVENOUS at 16:46

## 2021-10-16 RX ADMIN — IPRATROPIUM BROMIDE AND ALBUTEROL SULFATE 1 AMPULE: 2.5; .5 SOLUTION RESPIRATORY (INHALATION) at 23:38

## 2021-10-16 RX ADMIN — METOPROLOL TARTRATE 25 MG: 25 TABLET, FILM COATED ORAL at 21:09

## 2021-10-16 RX ADMIN — IPRATROPIUM BROMIDE AND ALBUTEROL SULFATE 1 AMPULE: 2.5; .5 SOLUTION RESPIRATORY (INHALATION) at 20:41

## 2021-10-16 RX ADMIN — SODIUM CHLORIDE, PRESERVATIVE FREE 10 ML: 5 INJECTION INTRAVENOUS at 21:06

## 2021-10-16 RX ADMIN — INSULIN GLARGINE 20 UNITS: 100 INJECTION, SOLUTION SUBCUTANEOUS at 21:13

## 2021-10-16 RX ADMIN — IPRATROPIUM BROMIDE AND ALBUTEROL SULFATE 1 AMPULE: .5; 3 SOLUTION RESPIRATORY (INHALATION) at 16:26

## 2021-10-16 RX ADMIN — APIXABAN 5 MG: 5 TABLET, FILM COATED ORAL at 21:09

## 2021-10-16 RX ADMIN — INSULIN LISPRO 1 UNITS: 100 INJECTION, SOLUTION INTRAVENOUS; SUBCUTANEOUS at 21:13

## 2021-10-16 RX ADMIN — PREDNISONE 60 MG: 20 TABLET ORAL at 18:41

## 2021-10-16 RX ADMIN — ENOXAPARIN SODIUM 40 MG: 40 INJECTION SUBCUTANEOUS at 21:09

## 2021-10-16 RX ADMIN — FUROSEMIDE 40 MG: 10 INJECTION, SOLUTION INTRAMUSCULAR; INTRAVENOUS at 15:19

## 2021-10-16 RX ADMIN — TAMSULOSIN HYDROCHLORIDE 0.8 MG: 0.4 CAPSULE ORAL at 21:11

## 2021-10-16 RX ADMIN — ALBUTEROL SULFATE 2.5 MG: 2.5 SOLUTION RESPIRATORY (INHALATION) at 16:26

## 2021-10-16 RX ADMIN — GABAPENTIN 800 MG: 400 CAPSULE ORAL at 21:09

## 2021-10-16 RX ADMIN — DEXTROSE MONOHYDRATE 500 MG: 50 INJECTION, SOLUTION INTRAVENOUS at 21:08

## 2021-10-16 ASSESSMENT — ENCOUNTER SYMPTOMS
SHORTNESS OF BREATH: 1
GASTROINTESTINAL NEGATIVE: 1

## 2021-10-16 NOTE — PROGRESS NOTES
Patient admitted to room 313 from ED. Patient oriented to room, call light, bed rails, phone, lights and bathroom. Patient instructed about the schedule of the day including: vital sign frequency, lab draws, possible tests, frequency of MD and staff rounds, daily weights, I &O's and prescribed diet. bed alarm in place, patient aware of placement and reason. Telemetry box in place, patient aware of placement and reason. Bed locked, in lowest position, side rails up 2/4, call light within reach. Recliner Assessment  Patient is able to demonstrate the ability to move from a reclining position to an upright position within the recliner. 4 Eyes Skin Assessment     The patient is being assess for   Admission    I agree that 2 RN's have performed a thorough Head to Toe Skin Assessment on the patient. ALL assessment sites listed below have been assessed. Areas assessed for pressure by both nurses:   [x]   Head, Face, and Ears   [x]   Shoulders, Back, and Chest, Abdomen  [x]   Arms, Elbows, and Hands   [x]   Coccyx, Sacrum, and Ischium  [x]   Legs, Feet, and Heels        Skin Assessed Under all Medical Devices by both nurses:  O2 device tubing              All Mepilex Borders were peeled back and area peeked at by both nurses:  No: NA  Please list where Mepilex Borders are located:  ***             **SHARE this note so that the co-signing nurse is able to place an eSignature**    Co-signer eSignature: {Esignature:[id number]}    Does the Patient have Skin Breakdown related to pressure?   No              Peter Prevention initiated:  NA   Wound Care Orders initiated:  NA      [facility] nurse consulted for Pressure Injury (Stage 3,4, Unstageable, DTI, NWPT, Complex wounds)and New or Established Ostomies:  NA      Primary Nurse eSignature: Electronically signed by Zack Gallo RN on 10/16/21 at 7:56 PM EDT

## 2021-10-16 NOTE — ED TRIAGE NOTES
Squad called for low heart rate, on arrival pt hr 50's-100's A-fib, pt has hist of a-fib, Pt on 4 L O2 at baseline, sats 89 on arrival, placed on 6L via squad with sats to 90's. On arrival pt placed on 5L to maintain sats 92%.    Pt states his COPD has been worse last several days and had increased WOB

## 2021-10-16 NOTE — PLAN OF CARE
Acute COPD exacerbation  Acute on chronic hypoxic respiratory failure  Possible CHF      Solu-Medrol  Pulmonary consult  IV Lasix  Echocardiogram

## 2021-10-16 NOTE — ED PROVIDER NOTES
[address] ED  EMERGENCY DEPARTMENT ENCOUNTER        Pt Name: Tobi Carpio MRN: [id number]  Birthdate 1955  Date of evaluation: 10/16/2021  Provider: Milly Nicolas PA-C  PCP: Mason Huntley MD  Note Started: 2:18 PM EDT       I have seen and evaluated this patient with my supervising physician Emmanuelle Meza, [facility]       Chief Complaint   Patient presents with    Shortness of Breath    Atrial Fibrillation       HISTORY OF PRESENT ILLNESS   (Location, Timing/Onset, Context/Setting, Quality, Duration, Modifying Factors, Severity, Associated Signs and Symptoms)  Note limiting factors. Chief Complaint: SOB    Tobi Carpio is a 78 y.o. male with a past medical history of asthma, A. fib on Eliquis, COPD, diabetes, sleep apnea, history of cancer brought in today by EMS from home (lives with daughter) with complaints of breath x3 days. Onset of symptoms x3 days. Duration of symptoms have been persistent since onset. Context includes shortness of breath. He denies fevers chills nausea vomiting diarrhea. Denies chest pain. Denies cough or congestion. Denies sick contacts. He does wear oxygen at home wears 4 L due to COPD he has been wearing his normal baseline oxygen. No aggravating symptoms. No alleviating symptoms. He otherwise denies any other complaints. Nothing seems to make symptoms better or worse. Nursing Notes were all reviewed and agreed with or any disagreements were addressed in the HPI. REVIEW OF SYSTEMS    (2-9 systems for level 4, 10 or more for level 5)     Review of Systems   Constitutional: Negative. HENT: Negative. Respiratory: Positive for shortness of breath. Cardiovascular: Negative. Gastrointestinal: Negative. Genitourinary: Negative. Musculoskeletal: Negative. Skin: Negative. Neurological: Negative. Positives and Pertinent negatives as per HPI.  Except as noted above in the ROS, all other systems were reviewed and negative. PAST MEDICAL HISTORY     Past Medical History:   Diagnosis Date    Asthma     Atrial fibrillation (HCC)     Cancer (HCC)     COPD (chronic obstructive pulmonary disease) (HCC)     Diabetes mellitus (HCC)     Obstructive sleep apnea     Paroxysmal atrial fibrillation (HCC)          SURGICAL HISTORY     Past Surgical History:   Procedure Laterality Date    ATRIAL ABLATION SURGERY      CARPAL TUNNEL RELEASE      KNEE ARTHROSCOPY      NECK SURGERY      TONSILLECTOMY           CURRENTMEDICATIONS       Previous Medications    ALBUTEROL (PROVENTIL) (2.5 MG/3ML) 0.083% NEBULIZER SOLUTION    Take 2.5 mg by nebulization every 6 hours as needed for Wheezing    ALBUTEROL SULFATE HFA (VENTOLIN HFA) 108 (90 BASE) MCG/ACT INHALER    Inhale 2 puffs into the lungs every 6 hours as needed for Wheezing    APIXABAN (ELIQUIS) 5 MG TABS TABLET    Take by mouth 2 times daily    BASAGLAR KWIKPEN 100 UNIT/ML INJECTION PEN    Inject 10-40 Units into the skin nightly     BICALUTAMIDE (CASODEX) 50 MG CHEMO TABLET    Take 50 mg by mouth daily    DILTIAZEM (CARDIZEM CD) 240 MG EXTENDED RELEASE CAPSULE    Take 1 capsule by mouth daily    DULAGLUTIDE (TRULICITY SC)    Inject 0.5 mLs into the skin once a week     EMPAGLIFLOZIN (JARDIANCE) 25 MG TABLET    Take 25 mg by mouth daily    FINASTERIDE (PROSCAR) 5 MG TABLET    Take 1 tablet by mouth daily    GABAPENTIN (NEURONTIN) 800 MG TABLET    Take 800 mg by mouth 3 times daily.     GLIPIZIDE (GLUCOTROL) 5 MG TABLET    Take 5 mg by mouth daily     LEUPROLIDE ACETATE, 3 MONTH, (ELIGARD) 22.5 MG KIT    Inject 22.5 mg into the skin daily     MELATONIN 3 MG TABS TABLET    Take 5-10 mg by mouth nightly as needed    METOPROLOL TARTRATE (LOPRESSOR) 50 MG TABLET    Take 0.5 tablets by mouth 2 times daily    MIRTAZAPINE (REMERON) 15 MG TABLET    Take 7.5 mg by mouth nightly    OXYGEN    Inhale 4 L into the lungs continuous With concentrator and portability    POTASSIUM CHLORIDE (KLOR-CON M) 10 MEQ EXTENDED RELEASE TABLET    Take 10 mEq by mouth daily    PROCHLORPERAZINE (COMPAZINE) 10 MG TABLET    Take 10 mg by mouth 2 times daily    ROSUVASTATIN CALCIUM 10 MG CPSP    Take 5 mg by mouth daily    SITAGLIPTIN (JANUVIA) 100 MG TABLET    Take 100 mg by mouth daily    TAMSULOSIN (FLOMAX) 0.4 MG CAPSULE    Take 0.8 mg by mouth daily     VITAMIN D, ERGOCALCIFEROL, PO    Take 2,000 Int'l Units by mouth daily         ALLERGIES     Patient has no known allergies. FAMILYHISTORY     History reviewed. No pertinent family history. SOCIAL HISTORY       Social History     Tobacco Use    Smoking status: Current Every Day Smoker     Packs/day: 1.00     Years: 51.00     Pack years: 51.00     Types: Cigarettes    Smokeless tobacco: Never Used    Tobacco comment: 1/2 PPD 5/12/2020   Vaping Use    Vaping Use: Never used   Substance Use Topics    Alcohol use: Yes     Comment: SOCIALLY    Drug use: No       SCREENINGS    [place] Coma Scale  Eye Opening: Spontaneous  Best Verbal Response: Oriented  Best Motor Response: Obeys commands  [place] Coma Scale Score: 15        PHYSICAL EXAM    (up to 7 for level 4, 8 or more for level 5)     ED Triage Vitals   BP Temp Temp Source Pulse Resp SpO2 Height Weight   10/16/21 1351 -- 10/16/21 1404 10/16/21 1404 10/16/21 1404 10/16/21 1351 10/16/21 1404 10/16/21 1404   121/66  Oral 100 24 (!) 89 % 6' (1.829 m) 295 lb (133.8 kg)       Physical Exam  Vitals and nursing note reviewed. Constitutional:       General: He is awake. He is not in acute distress. Appearance: Normal appearance. He is well-developed. He is morbidly obese. He is ill-appearing. He is not toxic-appearing or diaphoretic. Interventions: Nasal cannula in place. Comments: 4 L NC (baseline for patient)    HENT:      Head: Normocephalic and atraumatic. Nose: Nose normal.   Eyes:      General:         Right eye: No discharge.          Left eye: No discharge. Cardiovascular:      Rate and Rhythm: Normal rate and regular rhythm. Pulses:           Radial pulses are 2+ on the right side and 2+ on the left side. Heart sounds: Normal heart sounds. No murmur heard. No gallop. Pulmonary:      Effort: Pulmonary effort is normal. No respiratory distress. Breath sounds: Decreased breath sounds and rhonchi present. No wheezing or rales. Chest:      Chest wall: No tenderness. Musculoskeletal:         General: No deformity. Normal range of motion. Cervical back: Normal range of motion and neck supple. Right lower leg: No edema. Left lower leg: No edema. Skin:     General: Skin is warm and dry. Neurological:      General: No focal deficit present. Mental Status: He is alert and oriented to person, place, and time. GCS: GCS eye subscore is 4. GCS verbal subscore is 5. GCS motor subscore is 6. Psychiatric:         Behavior: Behavior normal. Behavior is cooperative.          DIAGNOSTIC RESULTS   LABS:    Labs Reviewed   CBC WITH AUTO DIFFERENTIAL - Abnormal; Notable for the following components:       Result Value    RDW 17.6 (*)     Platelets 198 (*)     All other components within normal limits    Narrative:     Performed at:  [facility],  [place], [facility]   Phone [phone]   COMPREHENSIVE METABOLIC PANEL W/ REFLEX TO MG FOR LOW K - Abnormal; Notable for the following components:    Chloride 96 (*)     Glucose 138 (*)     Total Bilirubin 1.3 (*)     All other components within normal limits    Narrative:     Performed at:  [facility],  [place], [facility]   Phone [phone]   BRAIN NATRIURETIC PEPTIDE - Abnormal; Notable for the following components:    Pro-BNP 1,721 (*)     All other components within normal limits    Narrative:     Performed at:  [facility] (!) 83% (!) 85% (!) 83%   Weight:       Height:           Patient was given the following medications:  Medications   predniSONE (DELTASONE) tablet 60 mg (has no administration in time range)   furosemide (LASIX) injection 40 mg (40 mg IntraVENous Given 10/16/21 1519)   ipratropium-albuterol (DUONEB) nebulizer solution 1 ampule (1 ampule Inhalation Given 10/16/21 1626)   albuterol (PROVENTIL) nebulizer solution 2.5 mg (2.5 mg Nebulization Given 10/16/21 1626)           Patient brought in today by EMS with complaints of shortness of breath x3 days. On exam he is alert oriented breathing on his normal baseline 4 L nasal cannula satting at 91%. Nontoxic. No acute respiratory distress. Old labs and records reviewed. Patient seen and evaluated as well by myself and my attending. CBC shows no leukocytosis. Hemoglobin of 15.3. EKG reviewed by my attending see note for dictation. Blood glucose of 138. Kidney function unremarkable. No electrolyte abnormalities. Total bilirubin of 1.3. Troponin less than 0.01. BNP of 1721. Chest x-ray shows borderline cardiomegaly with suspected pulmonary edema. Patient given IV Lasix here. Rapid Covid is negative. Lactic acid of 1. Patient desatted while here in ED to 85% on 4 L will bump to 6 L and reevaluate. Given breathing treatments and steroids. Will consult the hospitalist.  Hospitalist was consulted patient will be admitted at this time. Patient stable at time of admission. FINAL IMPRESSION      1. COPD exacerbation ([facility] [address]          DISPOSITION/PLAN   DISPOSITION Decision To Admit 10/16/2021 04:27:47 PM      PATIENT REFERRED TO:  No follow-up provider specified. DISCHARGE MEDICATIONS:  New Prescriptions    No medications on file       DISCONTINUED MEDICATIONS:  Discontinued Medications    FUROSEMIDE (LASIX) 40 MG TABLET    Take 1 tablet by mouth 2 times daily    GABAPENTIN (NEURONTIN) 600 MG TABLET    Take 1,200 mg by mouth 2 times daily. (Please note that portions of this note were completed with a voice recognition program.  Efforts were made to edit the dictations but occasionally words are mis-transcribed.)    No Hernandez PA-C (electronically signed)            No Hernandez PA-C  10/16/21 5986

## 2021-10-16 NOTE — PROGRESS NOTES
Pt admitted to PCU from ED, assessment completed. Oriented to room, call light and bedside table in reach. Will continue to monitor.

## 2021-10-16 NOTE — ED NOTES
Devante sent to Dr. Keshawn Galvan at [facility]  10/16/21 1613    Devante completed with call back at [facility]  10/16/21 2238

## 2021-10-16 NOTE — ED NOTES
Bed: 15  Expected date:   Expected time:   Means of arrival:   Comments:  rony Gonzales  10/16/21 6644

## 2021-10-17 LAB
ANION GAP SERPL CALCULATED.3IONS-SCNC: 11 MMOL/L (ref 3–16)
BASE EXCESS ARTERIAL: 3.9 MMOL/L (ref -3–3)
BASOPHILS ABSOLUTE: 0 K/UL (ref 0–0.2)
BASOPHILS RELATIVE PERCENT: 0.7 %
BUN BLDV-MCNC: 16 MG/DL (ref 7–20)
CALCIUM SERPL-MCNC: 8.5 MG/DL (ref 8.3–10.6)
CARBOXYHEMOGLOBIN ARTERIAL: 1.5 % (ref 0–1.5)
CHLORIDE BLD-SCNC: 96 MMOL/L (ref 99–110)
CO2: 29 MMOL/L (ref 21–32)
CREAT SERPL-MCNC: 1.2 MG/DL (ref 0.8–1.3)
EOSINOPHILS ABSOLUTE: 0 K/UL (ref 0–0.6)
EOSINOPHILS RELATIVE PERCENT: 0 %
GFR AFRICAN AMERICAN: >60
GFR NON-AFRICAN AMERICAN: >60
GLUCOSE BLD-MCNC: 171 MG/DL (ref 70–99)
GLUCOSE BLD-MCNC: 185 MG/DL (ref 70–99)
GLUCOSE BLD-MCNC: 185 MG/DL (ref 70–99)
GLUCOSE BLD-MCNC: 207 MG/DL (ref 70–99)
GLUCOSE BLD-MCNC: 247 MG/DL (ref 70–99)
GLUCOSE BLD-MCNC: 279 MG/DL (ref 70–99)
HCO3 ARTERIAL: 32.9 MMOL/L (ref 21–29)
HCT VFR BLD CALC: 46.6 % (ref 40.5–52.5)
HEMOGLOBIN, ART, EXTENDED: 15.8 G/DL (ref 13.5–17.5)
HEMOGLOBIN: 15 G/DL (ref 13.5–17.5)
INFLUENZA A: NOT DETECTED
INFLUENZA B: NOT DETECTED
LYMPHOCYTES ABSOLUTE: 0.5 K/UL (ref 1–5.1)
LYMPHOCYTES RELATIVE PERCENT: 8.4 %
MCH RBC QN AUTO: 31.1 PG (ref 26–34)
MCHC RBC AUTO-ENTMCNC: 32.3 G/DL (ref 31–36)
MCV RBC AUTO: 96.5 FL (ref 80–100)
METHEMOGLOBIN ARTERIAL: 0.2 %
MONOCYTES ABSOLUTE: 0 K/UL (ref 0–1.3)
MONOCYTES RELATIVE PERCENT: 0.8 %
NEUTROPHILS ABSOLUTE: 5 K/UL (ref 1.7–7.7)
NEUTROPHILS RELATIVE PERCENT: 90.1 %
O2 SAT, ARTERIAL: 94.2 %
O2 THERAPY: ABNORMAL
PCO2 ARTERIAL: 68.5 MMHG (ref 35–45)
PDW BLD-RTO: 17.1 % (ref 12.4–15.4)
PERFORMED ON: ABNORMAL
PH ARTERIAL: 7.3 (ref 7.35–7.45)
PLATELET # BLD: 118 K/UL (ref 135–450)
PMV BLD AUTO: 9 FL (ref 5–10.5)
PO2 ARTERIAL: 79.8 MMHG (ref 75–108)
POTASSIUM REFLEX MAGNESIUM: 5.6 MMOL/L (ref 3.5–5.1)
RBC # BLD: 4.83 M/UL (ref 4.2–5.9)
SARS-COV-2 RNA, RT PCR: NOT DETECTED
SODIUM BLD-SCNC: 136 MMOL/L (ref 136–145)
TCO2 ARTERIAL: 35 MMOL/L
WBC # BLD: 5.6 K/UL (ref 4–11)

## 2021-10-17 PROCEDURE — 6360000002 HC RX W HCPCS

## 2021-10-17 PROCEDURE — 82803 BLOOD GASES ANY COMBINATION: CPT

## 2021-10-17 PROCEDURE — 94640 AIRWAY INHALATION TREATMENT: CPT

## 2021-10-17 PROCEDURE — 6370000000 HC RX 637 (ALT 250 FOR IP): Performed by: INTERNAL MEDICINE

## 2021-10-17 PROCEDURE — 87070 CULTURE OTHR SPECIMN AEROBIC: CPT

## 2021-10-17 PROCEDURE — 99223 1ST HOSP IP/OBS HIGH 75: CPT | Performed by: INTERNAL MEDICINE

## 2021-10-17 PROCEDURE — 87205 SMEAR GRAM STAIN: CPT

## 2021-10-17 PROCEDURE — 36600 WITHDRAWAL OF ARTERIAL BLOOD: CPT

## 2021-10-17 PROCEDURE — 2060000000 HC ICU INTERMEDIATE R&B

## 2021-10-17 PROCEDURE — 87186 SC STD MICRODIL/AGAR DIL: CPT

## 2021-10-17 PROCEDURE — 94761 N-INVAS EAR/PLS OXIMETRY MLT: CPT

## 2021-10-17 PROCEDURE — 6360000002 HC RX W HCPCS: Performed by: INTERNAL MEDICINE

## 2021-10-17 PROCEDURE — 2580000003 HC RX 258

## 2021-10-17 PROCEDURE — 2700000000 HC OXYGEN THERAPY PER DAY

## 2021-10-17 PROCEDURE — 87077 CULTURE AEROBIC IDENTIFY: CPT

## 2021-10-17 PROCEDURE — 85025 COMPLETE CBC W/AUTO DIFF WBC: CPT

## 2021-10-17 PROCEDURE — 87636 SARSCOV2 & INF A&B AMP PRB: CPT

## 2021-10-17 PROCEDURE — 2580000003 HC RX 258: Performed by: INTERNAL MEDICINE

## 2021-10-17 PROCEDURE — 80048 BASIC METABOLIC PNL TOTAL CA: CPT

## 2021-10-17 PROCEDURE — 36415 COLL VENOUS BLD VENIPUNCTURE: CPT

## 2021-10-17 RX ORDER — METHYLPREDNISOLONE SODIUM SUCCINATE 40 MG/ML
40 INJECTION, POWDER, LYOPHILIZED, FOR SOLUTION INTRAMUSCULAR; INTRAVENOUS EVERY 12 HOURS
Status: DISCONTINUED | OUTPATIENT
Start: 2021-10-17 | End: 2021-10-19 | Stop reason: HOSPADM

## 2021-10-17 RX ADMIN — METOPROLOL TARTRATE 25 MG: 25 TABLET, FILM COATED ORAL at 11:01

## 2021-10-17 RX ADMIN — METOPROLOL TARTRATE 25 MG: 25 TABLET, FILM COATED ORAL at 20:21

## 2021-10-17 RX ADMIN — ENOXAPARIN SODIUM 40 MG: 40 INJECTION SUBCUTANEOUS at 10:50

## 2021-10-17 RX ADMIN — FUROSEMIDE 20 MG: 10 INJECTION, SOLUTION INTRAMUSCULAR; INTRAVENOUS at 10:51

## 2021-10-17 RX ADMIN — IPRATROPIUM BROMIDE AND ALBUTEROL SULFATE 1 AMPULE: 2.5; .5 SOLUTION RESPIRATORY (INHALATION) at 07:55

## 2021-10-17 RX ADMIN — METHYLPREDNISOLONE SODIUM SUCCINATE 40 MG: 40 INJECTION, POWDER, FOR SOLUTION INTRAMUSCULAR; INTRAVENOUS at 05:57

## 2021-10-17 RX ADMIN — CEFTRIAXONE 2000 MG: 2 INJECTION, POWDER, FOR SOLUTION INTRAMUSCULAR; INTRAVENOUS at 10:59

## 2021-10-17 RX ADMIN — SODIUM ZIRCONIUM CYCLOSILICATE 10 G: 10 POWDER, FOR SUSPENSION ORAL at 11:01

## 2021-10-17 RX ADMIN — ALOGLIPTIN 12.5 MG: 12.5 TABLET, FILM COATED ORAL at 11:29

## 2021-10-17 RX ADMIN — INSULIN LISPRO 2 UNITS: 100 INJECTION, SOLUTION INTRAVENOUS; SUBCUTANEOUS at 21:54

## 2021-10-17 RX ADMIN — IPRATROPIUM BROMIDE AND ALBUTEROL 1 PUFF: 20; 100 SPRAY, METERED RESPIRATORY (INHALATION) at 15:43

## 2021-10-17 RX ADMIN — GABAPENTIN 800 MG: 400 CAPSULE ORAL at 20:21

## 2021-10-17 RX ADMIN — IPRATROPIUM BROMIDE AND ALBUTEROL 1 PUFF: 20; 100 SPRAY, METERED RESPIRATORY (INHALATION) at 11:51

## 2021-10-17 RX ADMIN — GABAPENTIN 800 MG: 400 CAPSULE ORAL at 15:20

## 2021-10-17 RX ADMIN — DEXTROSE MONOHYDRATE 500 MG: 50 INJECTION, SOLUTION INTRAVENOUS at 20:21

## 2021-10-17 RX ADMIN — IPRATROPIUM BROMIDE AND ALBUTEROL SULFATE 1 AMPULE: .5; 3 SOLUTION RESPIRATORY (INHALATION) at 23:01

## 2021-10-17 RX ADMIN — METHYLPREDNISOLONE SODIUM SUCCINATE 40 MG: 40 INJECTION, POWDER, FOR SOLUTION INTRAMUSCULAR; INTRAVENOUS at 10:50

## 2021-10-17 RX ADMIN — SODIUM CHLORIDE, PRESERVATIVE FREE 10 ML: 5 INJECTION INTRAVENOUS at 20:21

## 2021-10-17 RX ADMIN — METHYLPREDNISOLONE SODIUM SUCCINATE 40 MG: 40 INJECTION, POWDER, FOR SOLUTION INTRAMUSCULAR; INTRAVENOUS at 21:54

## 2021-10-17 RX ADMIN — APIXABAN 5 MG: 5 TABLET, FILM COATED ORAL at 20:21

## 2021-10-17 RX ADMIN — IPRATROPIUM BROMIDE AND ALBUTEROL 1 PUFF: 20; 100 SPRAY, METERED RESPIRATORY (INHALATION) at 19:37

## 2021-10-17 RX ADMIN — INSULIN GLARGINE 20 UNITS: 100 INJECTION, SOLUTION SUBCUTANEOUS at 21:54

## 2021-10-17 RX ADMIN — DILTIAZEM HYDROCHLORIDE 240 MG: 240 CAPSULE, COATED, EXTENDED RELEASE ORAL at 10:51

## 2021-10-17 RX ADMIN — TAMSULOSIN HYDROCHLORIDE 0.8 MG: 0.4 CAPSULE ORAL at 10:50

## 2021-10-17 RX ADMIN — ROSUVASTATIN CALCIUM 5 MG: 10 TABLET, FILM COATED ORAL at 10:50

## 2021-10-17 RX ADMIN — GABAPENTIN 800 MG: 400 CAPSULE ORAL at 10:50

## 2021-10-17 RX ADMIN — MIRTAZAPINE 7.5 MG: 15 TABLET, FILM COATED ORAL at 20:21

## 2021-10-17 RX ADMIN — APIXABAN 5 MG: 5 TABLET, FILM COATED ORAL at 10:50

## 2021-10-17 RX ADMIN — FINASTERIDE 5 MG: 5 TABLET, FILM COATED ORAL at 10:50

## 2021-10-17 NOTE — PROGRESS NOTES
Consult called to Dr. [facility] office @ [phone]    Electronically signed by Jose Cooley RN on 10/17/2021 at 6:46 AM

## 2021-10-17 NOTE — H&P
Hospital Medicine History & Physical      PCP: Beni Okeefe MD    Date of Admission: 10/16/2021     Date of Service: Pt seen/examined on 10/17/2021    Chief Complaint:    Chief Complaint   Patient presents with    Shortness of Breath    Atrial Fibrillation     History Of Present Illness: The patient is a 78 y.o. male with asthma, atrial fibrillation anticoagulated on Eliquis, COPD, chronic hypoxic respiratory failure, diabetes, MARIA ELENA and history of metastatic prostate cancer who presented to [facility] ED with complaint of shortness of breath of a couple days duration. Admits to having minimal cough. Denies any fever or chills. No chest pain. No swelling in his lower extremities. He was hypoxic in the emergency room requiring about 6 L of oxygen. Currently is on 10 L. He uses 4 L of oxygen at home. Rapid COVID-19 test negative. He he had the J&J COVID-19 vaccine 4/2021    Past Medical History:        Diagnosis Date    Asthma     Atrial fibrillation (HCC)     Cancer (HCC)     COPD (chronic obstructive pulmonary disease) (HCC)     Diabetes mellitus (HCC)     Obstructive sleep apnea     Paroxysmal atrial fibrillation (HCC)        Past Surgical History:        Procedure Laterality Date    ATRIAL ABLATION SURGERY      CARPAL TUNNEL RELEASE      KNEE ARTHROSCOPY      NECK SURGERY      TONSILLECTOMY         Medications Prior to Admission:    Prior to Admission medications    Medication Sig Start Date End Date Taking? Authorizing Provider   VITAMIN D, ERGOCALCIFEROL, PO Take 2,000 Int'l Units by mouth daily   Yes Historical Provider, MD   gabapentin (NEURONTIN) 800 MG tablet Take 800 mg by mouth 3 times daily.    Yes Historical Provider, MD   Rosuvastatin Calcium 10 MG CPSP Take 5 mg by mouth daily   Yes Historical Provider, MD   empagliflozin (JARDIANCE) 25 MG tablet Take 25 mg by mouth daily   Yes Historical Provider, MD   metoprolol tartrate (LOPRESSOR) 50 MG tablet Take 0.5 tablets by mouth 2 times daily 7/30/19  Yes Sera Conrad MD   OXYGEN Inhale 4 L into the lungs continuous With concentrator and portability 7/30/19  Yes Sera Conrad MD   melatonin 3 MG TABS tablet Take 5-10 mg by mouth nightly as needed   Yes Historical Provider, MD Shelbie Angela KWIKPEN 100 UNIT/ML injection pen Inject into the skin nightly 20 units with breakfast   15 units with lunch  15 units with dinner 3/4/19  Yes Historical Provider, MD   tamsulosin (FLOMAX) 0.4 MG capsule Take 0.8 mg by mouth daily    Yes Historical Provider, MD   apixaban (ELIQUIS) 5 MG TABS tablet Take by mouth 2 times daily   Yes Historical Provider, MD   potassium chloride (KLOR-CON M) 10 MEQ extended release tablet Take 10 mEq by mouth daily   Yes Historical Provider, MD   diltiazem (CARDIZEM CD) 240 MG extended release capsule Take 1 capsule by mouth daily 6/9/17  Yes Aryan Galindo MD       Allergies:  Patient has no known allergies. Social History:  The patient currently lives at home    TOBACCO:   reports that he has been smoking cigarettes. He has a 51.00 pack-year smoking history. He has never used smokeless tobacco.  ETOH:   reports current alcohol use. Family History:   Positive as follows:    History reviewed. No pertinent family history. REVIEW OF SYSTEMS:       Constitutional: Negative for fever   HENT: Negative for sore throat   Eyes: Negative for redness   Respiratory: +  for dyspnea, cough   Cardiovascular: Negative for chest pain   Gastrointestinal: Negative for vomiting, diarrhea   Genitourinary: Negative for hematuria   Musculoskeletal: Negative for arthralgias   Skin: Negative for rash   Neurological: Negative for syncope   Hematological: Negative for adenopathy   Psychiatric/Behavorial: Negative for anxiety    PHYSICAL EXAM:    /72   Pulse 108   Temp 97.8 °F (36.6 °C) (Oral)   Resp 18   Ht 6' (1.829 m)   Wt (!) 308 lb (139.7 kg)   SpO2 90%   BMI 41.77 kg/m²     Gen: No distress. Alert. Eyes: PERRL.  No sclera icterus. No conjunctival injection. ENT: No discharge. Pharynx clear. Neck: No JVD. No Carotid Bruit. Trachea midline. Resp: No accessory muscle use. Bibasilar crackles. No wheezes. No rhonchi. CV: Regular rate. Regular rhythm. No murmur. No rub. No edema. Capillary Refill: Brisk,< 3 seconds   Peripheral Pulses: +2 palpable, equal bilaterally   GI: Non-tender. Non-distended. No masses. No organomegaly. Normal bowel sounds. No hernia. Skin: Warm and dry. No nodule on exposed extremities. No rash on exposed extremities. M/S: No cyanosis. No joint deformity. No clubbing. Neuro: Awake. Grossly nonfocal    Psych: Oriented x 3. No anxiety or agitation. CBC:   Recent Labs     10/16/21  1402   WBC 5.5   HGB 15.3   HCT 49.0   MCV 97.7   *     BMP:   Recent Labs     10/16/21  1402      K 4.4   CL 96*   CO2 30   BUN 14   CREATININE 1.2     LIVER PROFILE:   Recent Labs     10/16/21  1402   AST 17   ALT 13   BILITOT 1.3*   ALKPHOS 80     CARDIAC ENZYMES  Recent Labs     10/16/21  1402   TROPONINI <0.01     CULTURES  COVID-19: Not detected  Blood CX: Pending    EKG:  I have reviewed the EKG with the following interpretation:   Atrial fibrillation with rapid ventricular response with premature ventricular or aberrantly conducted complexes  Right axis deviation  Incomplete right bundle branch block  Possible Right ventricular hypertrophy  Nonspecific ST abnormality    RADIOLOGY  CT Head WO Contrast   Final Result      No evidence for acute intracranial hemorrhage, territorial infarction or   intracranial mass lesion. Mild chronic microangiopathic ischemic disease. Mild generalized volume loss. Complete opacification of left maxillary sinus. CT CHEST PULMONARY EMBOLISM W CONTRAST   Final Result   Prominent central pulmonary vessels suggestive of pulmonary artery   hypertension or venous congestion with no pulmonary emboli.       Mildly dilated and atherosclerotic thoracic aorta with no aneurysm or   dissection. Hazy ground-glass opacities along the upper lobes extending inferiorly which   could represent early multisegmental bronchopneumonia. This pattern of   pneumonia has been described with COVID-19 disease. Small bibasilar pleural effusions and associated bibasilar atelectasis or   infiltrates posteriorly extending into the upper chest.      Mild mediastinal adenopathy which is of uncertain etiology. Suggest   follow-up or PET scan correlation         XR CHEST PORTABLE   Final Result   Borderline cardiomegaly with suspected pulmonary edema. Pertinent previous results reviewed   Echo 2/7/19  Summary   Atrial fibrillation throughout the study. Definity contrast administered. LV systolic function is hyperdynamic with EF estimated >65%. Asynchronous septal motion but no obvious segmental wall motion   abnormalities. There is moderate concentric left ventricular hypertrophy. Diastolic function is normal.   RV systolic function appears moderately reduced. The right atrium is moderately dilated. Aortic valve appears sclerotic but opens adequately. Mild tricuspid regurgitation. Systolic pulmonary artery pressure (SPAP) estimated at 53mmHg (right atrial   pressure 8 mmHg), c/w moderate pulmonary hypertension. ASSESSMENT/PLAN:  Groundglass opacities in both lungs on CT. COPD acute exacerbation  Acute on chronic hypoxic respiratory failure  -Presented to the ED with shortness of breath  -Chest x-ray with borderline cardiomegaly with suspected pulmonary edema-> CTPA negative for PE  Rapid COVID-19 test negative.   Obtain COVID-19 PCR  -Sputum culture pending  Continue Rocephin and azithromycin day #2.  -Uses 4 L NC O2 at baseline--> now requiring up to 10 L  -Continue Solu-Medrol (hx of BG being very sensitive to steroids, monitor closely), albuterol/DuoNebs  -Pulmonology consult, follows with Dr. Alda Vang outpatient    Possible new onset/Acute CHF exacerbation  Pulmonary hypertension  -Most recent echo from 2019 with EF greater than 65% with asynchronous septal wall motion with moderate LVH and normal diastolic function, reduced RV function and moderate pulmonary hypertension  -BNP mildly elevated and imaging showing pulmonary edema  -Check echo  -IV Lasix 40 mg x 1 dose given in the ED, start IV Lasix 20 mg x 1  -Daily weights, monitor I's and O's, low-sodium diet      Paroxysmal atrial fibrillation  -AC on Eliquis, continue  -Rates mildly over 100 but otherwise controlled  -Continue home Cardizem, beta-blocker  -Continue telemetry monitor    Thrombocytopenia  -Baseline PLT~120-140  -PLT on admission 122  -Anticoagulated on Eliquis  -No evidence of acute bleeding  -Monitor, stable    DM 2 with neuropathy  -Continue home oral medications  -Continue gabapentin  -BG is controlled  -Continue SSI at home nightly Lantus    Stage IV prostate cancer  -Followed by [facility]    -With mets to the bladder and possible pelvic lymph node  -S/p radiation  Not on chemotherapy or antiandrogen therapy now    HLD  - Continue statin     MARIA ELENA  -Continue home CPAP    DVT Prophylaxis: Eliquis   Diet: ADULT DIET; Regular; 4 carb choices (60 gm/meal)  Code Status: Full Code    MARINO Edwards.

## 2021-10-17 NOTE — PROGRESS NOTES
ABG drawn x 1 attempt(s) from radial artery. Patient had positive modified Robel's Test.  Patient was on 10 (LPM/Fio2) oxygen per HF NC (device). Pressure held x 5 minutes. No bleeding or bruising noted at puncture site. Patient tolerated procedure well.

## 2021-10-17 NOTE — CONSULTS
Oral Daily    tamsulosin  0.8 mg Oral Daily    sodium chloride flush  5-40 mL IntraVENous 2 times per day    enoxaparin  40 mg SubCUTAneous Daily    methylPREDNISolone  40 mg IntraVENous Q6H    Followed by   Rafat  ON 10/19/2021] predniSONE  40 mg Oral Daily    insulin lispro  0-6 Units SubCUTAneous TID     insulin lispro  0-3 Units SubCUTAneous Nightly    azithromycin  500 mg IntraVENous Q24H    furosemide  20 mg IntraVENous Once    ipratropium-albuterol  1 ampule Inhalation Q4H While awake     Continuous Infusions:   dextrose      sodium chloride       PRN Meds:  glucose, dextrose, glucagon (rDNA), dextrose, sodium chloride flush, sodium chloride, ondansetron **OR** ondansetron, polyethylene glycol, acetaminophen **OR** acetaminophen, perflutren lipid microspheres, ipratropium-albuterol    ALLERGIES:  Patient has No Known Allergies. REVIEW OF SYSTEMS:  Constitutional: Negative for fever  HENT: Negative for sore throat  Eyes: Negative for redness   Respiratory: + for dyspnea, cough  Cardiovascular: Negative for chest pain  Gastrointestinal: Negative for vomiting, diarrhea   Genitourinary: Negative for hematuria   Musculoskeletal: Negative for arthralgias   Skin: Negative for rash  Neurological: Negative for syncope  Hematological: Negative for adenopathy  Psychiatric/Behavorial: Negative for anxiety    PHYSICAL EXAM:  Blood pressure 122/77, pulse 109, temperature 97.7 °F (36.5 °C), temperature source Oral, resp. rate 18, height 6' (1.829 m), weight (!) 307 lb 4 oz (139.4 kg), SpO2 90 %.' on 8 L  Gen:  No distress. Morbidly obese. Eyes:  PERRL. No sclera icterus. No conjunctival injection. ENT:  No discharge. Pharynx clear. Neck:  Trachea midline. No obvious mass. Resp:  No accessory muscle use. No crackles. No wheezes. No rhonchi. No dullness on percussion. CV:  Regular rate. Regular rhythm. No murmur or rub. No edema. Peripheral pulses are 2+.   Capillary refill is less than 3 seconds. GI:  Non-tender. Non-distended. No hernia. Skin:  Warm and dry. No nodule on exposed extremities. Lymph:  No cervical LAD. No supraclavicular LAD. M/S:  No cyanosis. No joint deformity. No clubbing. Neuro:  Awake. Alert. Moves all four extremities. Psych:  Oriented x 3. No anxiety. LABS:  CBC:   Recent Labs     10/16/21  1402 10/17/21  0518   WBC 5.5 5.6   HGB 15.3 15.0   HCT 49.0 46.6   MCV 97.7 96.5   * 118*     BMP:   Recent Labs     10/16/21  1402 10/17/21  0518    136   K 4.4 5.6*   CL 96* 96*   CO2 30 29   BUN 14 16   CREATININE 1.2 1.2     LIVER PROFILE:   Recent Labs     10/16/21  1402   AST 17   ALT 13   BILITOT 1.3*   ALKPHOS 88     PT/INR: No results for input(s): PROTIME, INR in the last 72 hours. APTT: No results for input(s): APTT in the last 72 hours. UA:No results for input(s): NITRITE, COLORU, PHUR, LABCAST, WBCUA, RBCUA, MUCUS, TRICHOMONAS, YEAST, BACTERIA, CLARITYU, SPECGRAV, LEUKOCYTESUR, UROBILINOGEN, BILIRUBINUR, BLOODU, GLUCOSEU, AMORPHOUS in the last 72 hours. Invalid input(s): KETONESU  No results for input(s): PHART, LKH9NUU, PO2ART in the last 72 hours. Micro:       Imaging: Chest imaging was reviewed by me and showed:  CTPA 10/16/21  Mediastinum: There are multiple small lymph nodes along the pretracheal   region and AP window with the largest seen along the jacqueline measuring 2 cm. The heart and pericardium demonstrate no acute abnormality.  The aorta is   well opacified and mildly dilated with calcified plaque throughout. Saad Duff is   no aneurysm or dissection.       Lungs/pleura:  There are hazy ground-glass opacities scattered throughout the   upper lobes extending inferiorly into the lung bases.  There are small   bibasilar pleural effusions with bibasilar atelectasis or infiltrates   posteriorly.  No pulmonary nodule or mass can be seen     Impression   Prominent central pulmonary vessels suggestive of pulmonary artery   hypertension or

## 2021-10-17 NOTE — PROGRESS NOTES
Patient must supply their own Jardiance. When medications have arrived on unit, call Pharmacy to verify the orders. Pharmacy is no longer accepting home medications. Home medications are to be locked in the patient's room medication bin or other secure area on the nursing unit.   Darwin Sommers PharmD, [facility], 10/16/2021 9:11 PM

## 2021-10-17 NOTE — PROGRESS NOTES
RT Inhaler-Nebulizer Bronchodilator Protocol Note    There is a bronchodilator order in the chart from a provider indicating to follow the RT Bronchodilator Protocol and there is an Initiate RT Inhaler-Nebulizer Bronchodilator Protocol order as well (see protocol at bottom of note). CXR Findings:  XR CHEST PORTABLE    Result Date: 10/16/2021  Borderline cardiomegaly with suspected pulmonary edema. The findings from the last RT Protocol Assessment were as follows:   History Pulmonary Disease: (P) Chronic pulmonary disease  Respiratory Pattern: (P) Dyspnea on exertion or RR 21-25 bpm  Breath Sounds: (P) Slightly diminished and/or crackles  Cough: (P) Strong, spontaneous, non-productive  Indication for Bronchodilator Therapy: (P) Decreased or absent breath sounds  Bronchodilator Assessment Score: (P) 6    Aerosolized bronchodilator medication orders have been revised according to the RT Inhaler-Nebulizer Bronchodilator Protocol below. Respiratory Therapist to perform RT Therapy Protocol Assessment initially then follow the protocol. Repeat RT Therapy Protocol Assessment PRN for score 0-3 or on second treatment, BID, and PRN for scores above 3. No Indications - adjust the frequency to every 6 hours PRN wheezing or bronchospasm, if no treatments needed after 48 hours then discontinue using Per Protocol order mode. If indication present, adjust the RT bronchodilator orders based on the Bronchodilator Assessment Score as indicated below. Use Inhaler orders unless patient has one or more of the following: on home nebulizer, not able to hold breath for 10 seconds, is not alert and oriented, cannot activate and use MDI correctly, or respiratory rate 25 breaths per minute or more, then use the equivalent nebulizer order(s) with same Frequency and PRN reasons based on the score. If a patient is on this medication at home then do not decrease Frequency below that used at home.     0-3 - enter or revise RT bronchodilator order(s) to equivalent RT Bronchodilator order with Frequency of every 4 hours PRN for wheezing or increased work of breathing using Per Protocol order mode. 4-6 - enter or revise RT Bronchodilator order(s) to two equivalent RT bronchodilator orders with one order with BID Frequency and one order with Frequency of every 4 hours PRN wheezing or increased work of breathing using Per Protocol order mode. 7-10 - enter or revise RT Bronchodilator order(s) to two equivalent RT bronchodilator orders with one order with TID Frequency and one order with Frequency of every 4 hours PRN wheezing or increased work of breathing using Per Protocol order mode. 11-13 - enter or revise RT Bronchodilator order(s) to one equivalent RT bronchodilator order with QID Frequency and an Albuterol order with Frequency of every 4 hours PRN wheezing or increased work of breathing using Per Protocol order mode. Greater than 13 - enter or revise RT Bronchodilator order(s) to one equivalent RT bronchodilator order with every 4 hours Frequency and an Albuterol order with Frequency of every 2 hours PRN wheezing or increased work of breathing using Per Protocol order mode. RT to enter RT Home Evaluation for COPD & MDI Assessment order using Per Protocol order mode.     Electronically signed by Zina Ruiz RCP on 10/17/2021 at 8:00 AM

## 2021-10-17 NOTE — PROGRESS NOTES
RT Inhaler-Nebulizer Bronchodilator Protocol Note    There is a bronchodilator order in the chart from a provider indicating to follow the RT Bronchodilator Protocol and there is an Initiate RT Inhaler-Nebulizer Bronchodilator Protocol order as well (see protocol at bottom of note). CXR Findings:  XR CHEST PORTABLE    Result Date: 10/16/2021  Borderline cardiomegaly with suspected pulmonary edema. The findings from the last RT Protocol Assessment were as follows:   History Pulmonary Disease: Chronic pulmonary disease  Respiratory Pattern: Dyspnea on exertion or RR 21-25 bpm  Breath Sounds: Slightly diminished and/or crackles  Cough: Strong, spontaneous, non-productive  Indication for Bronchodilator Therapy: Decreased or absent breath sounds  Bronchodilator Assessment Score: 6    Aerosolized bronchodilator medication orders have been revised according to the RT Inhaler-Nebulizer Bronchodilator Protocol below. Respiratory Therapist to perform RT Therapy Protocol Assessment initially then follow the protocol. Repeat RT Therapy Protocol Assessment PRN for score 0-3 or on second treatment, BID, and PRN for scores above 3. No Indications - adjust the frequency to every 6 hours PRN wheezing or bronchospasm, if no treatments needed after 48 hours then discontinue using Per Protocol order mode. If indication present, adjust the RT bronchodilator orders based on the Bronchodilator Assessment Score as indicated below. Use Inhaler orders unless patient has one or more of the following: on home nebulizer, not able to hold breath for 10 seconds, is not alert and oriented, cannot activate and use MDI correctly, or respiratory rate 25 breaths per minute or more, then use the equivalent nebulizer order(s) with same Frequency and PRN reasons based on the score. If a patient is on this medication at home then do not decrease Frequency below that used at home.     0-3 - enter or revise RT bronchodilator order(s) to equivalent RT Bronchodilator order with Frequency of every 4 hours PRN for wheezing or increased work of breathing using Per Protocol order mode. 4-6 - enter or revise RT Bronchodilator order(s) to two equivalent RT bronchodilator orders with one order with BID Frequency and one order with Frequency of every 4 hours PRN wheezing or increased work of breathing using Per Protocol order mode. 7-10 - enter or revise RT Bronchodilator order(s) to two equivalent RT bronchodilator orders with one order with TID Frequency and one order with Frequency of every 4 hours PRN wheezing or increased work of breathing using Per Protocol order mode. 11-13 - enter or revise RT Bronchodilator order(s) to one equivalent RT bronchodilator order with QID Frequency and an Albuterol order with Frequency of every 4 hours PRN wheezing or increased work of breathing using Per Protocol order mode. Greater than 13 - enter or revise RT Bronchodilator order(s) to one equivalent RT bronchodilator order with every 4 hours Frequency and an Albuterol order with Frequency of every 2 hours PRN wheezing or increased work of breathing using Per Protocol order mode.            Electronically signed by Parisa Thornton RCP on 10/16/2021 at 8:51 PM

## 2021-10-17 NOTE — CARE COORDINATION
Case Management Assessment  Initial Evaluation      Patient Name: Angy Us.   YOB: 1955  Diagnosis: COPD exacerbation ([facility] [J44.1]  [facility] [J[facility]]  Date / Time: 10/16/2021  1:39 PM    Admission status/Date:10/16/2021  Chart Reviewed: Yes      Patient Sandra Chacko: No --as pt would not answer phone  Family Interviewed:  Yes Mei Her (daughter/lay caregiver) [phone]      Hospitalization in the last 30 days:  No      Health Care Decision Maker :   Primary Decision Maker: [address] [place] [address] - [phone]    (CM - must 1st enter selection under Navigator - emergency contact- Angelan Relationship and pick relationship)   Who do you trust or have selected to make healthcare decisions for you      Met with: Karlie Ta conducted  (bedside/phone): phone    Current PCP: Yanelis Neville MD    Financial  Medicare  Precert required for SNF : Y, N          3 night stay required - Y, N    ADLS  Support Systems/Care Needs:    Transportation: Carter Clark    Meal Preparation: [address]: 2 [place] home with Carter Clark, pt lives in the basement  Steps: 2 to get in, and 7 to come up from the basement  Intent for return to present living arrangements: Yes  Identified Issues: [address] [facility] with [address] : No Agency:(Services)     Passport/Waiver : No  :                      Phone Number:    Passport/Waiver Services: n/a          Durable Medical Equiptment   DME Provider: [facility] home O2 3L, and concentrator goes up to 6L  Has a POC that goes up to 3L  Equipment: yes  Walker__X_Cane___RTS___ BSC_X__Shower Chair_X__Hospital Bed_X__W/C____Other________  02 at __3__Liter(s)---wears(frequency)___continuous____ HHN ___ CPAP_X__ BiPap___   N/A____      Home O2 Use :  Yes    If No for home O2---if presently on O2 during hospitalization:  Yes  if yes CM to follow for potential DC O2 need  Informed of need for care provider to bring portable home O2 tank on day of discharge for nursing to connect prior to leaving:   Yes  Verbalized agreement/Understanding:   Yes    Community Service Affiliation  Dialysis:  No    · Agency:  · Location:  · Dialysis Schedule:  · Phone:   · Fax: Other Community Services: (ex:PT/OT,Mental Health,Wound Clinic, Cardio/Pul [employer]) [facility] and Santiago Hdez, Dr. Nils Stewart ([facility]), [facility] Oncology/Hemotology    DISCHARGE PLAN: Explained Case Management role/services. Reviewed chart. Role of discharge planner explained and patient's daughter/lay caregiver, Maru Banuelos, verbalized understanding. Per Maru Banuelos, pt is from a 2 story home with Maru Banuelos, pt lives in the basement and plans to return. Pt is active with  [facility] home O2 3L, and a concentrator goes up to 6L and also Has a POC that goes up to 3L. Pt is currently on 10L here. [facility] is [facility] with [facility] and Bjorn, Dr. Nils Stewart ([facility]), [facility] Oncology/Hemotology. Maru Banuelos with like HC at d/c for SN/PT/OT/SW/HA--[facility] agencies. Rapid covid neg on 10/16/2021.--awaiting PCR. Vaccinated.

## 2021-10-17 NOTE — PROGRESS NOTES
Attempted to call patient's daughter Luis Miguel Arboleda back to update on father's plan of care, busy signal noted.

## 2021-10-17 NOTE — PROGRESS NOTES
Patient resting in chair, daughters at bedside. No acute distress noted. Call light in reach. Will continue to monitor.

## 2021-10-17 NOTE — PROGRESS NOTES
Pt refusing bed alarm at this time. Pt is alert and oriented and knows how to properly use call light.

## 2021-10-17 NOTE — ACP (ADVANCE CARE PLANNING)
Advance Care Planning   Healthcare Decision Maker:    Primary Decision Maker: Jessica Moise caregiver - [phone]    Click here to complete Healthcare Decision Makers including selection of the Healthcare Decision Maker Relationship (ie \"Primary\").

## 2021-10-18 LAB
ANION GAP SERPL CALCULATED.3IONS-SCNC: 13 MMOL/L (ref 3–16)
BASE EXCESS ARTERIAL: 2.5 MMOL/L (ref -3–3)
BUN BLDV-MCNC: 27 MG/DL (ref 7–20)
CALCIUM SERPL-MCNC: 9.1 MG/DL (ref 8.3–10.6)
CARBOXYHEMOGLOBIN ARTERIAL: 1.3 % (ref 0–1.5)
CHLORIDE BLD-SCNC: 95 MMOL/L (ref 99–110)
CO2: 29 MMOL/L (ref 21–32)
CREAT SERPL-MCNC: 1.1 MG/DL (ref 0.8–1.3)
GFR AFRICAN AMERICAN: >60
GFR NON-AFRICAN AMERICAN: >60
GLUCOSE BLD-MCNC: 232 MG/DL (ref 70–99)
GLUCOSE BLD-MCNC: 249 MG/DL (ref 70–99)
GLUCOSE BLD-MCNC: 295 MG/DL (ref 70–99)
GLUCOSE BLD-MCNC: 296 MG/DL (ref 70–99)
GLUCOSE BLD-MCNC: 314 MG/DL (ref 70–99)
GLUCOSE BLD-MCNC: 349 MG/DL (ref 70–99)
HCO3 ARTERIAL: 29.7 MMOL/L (ref 21–29)
HCT VFR BLD CALC: 46.9 % (ref 40.5–52.5)
HEMOGLOBIN, ART, EXTENDED: 15.6 G/DL (ref 13.5–17.5)
HEMOGLOBIN: 15.1 G/DL (ref 13.5–17.5)
LV EF: 58 %
LVEF MODALITY: NORMAL
MCH RBC QN AUTO: 31 PG (ref 26–34)
MCHC RBC AUTO-ENTMCNC: 32.1 G/DL (ref 31–36)
MCV RBC AUTO: 96.5 FL (ref 80–100)
METHEMOGLOBIN ARTERIAL: 0.2 %
O2 CONTENT ARTERIAL: 21 ML/DL
O2 SAT, ARTERIAL: 94.8 %
O2 THERAPY: ABNORMAL
PCO2 ARTERIAL: 55.5 MMHG (ref 35–45)
PDW BLD-RTO: 17.1 % (ref 12.4–15.4)
PERFORMED ON: ABNORMAL
PH ARTERIAL: 7.35 (ref 7.35–7.45)
PLATELET # BLD: 114 K/UL (ref 135–450)
PMV BLD AUTO: 9.4 FL (ref 5–10.5)
PO2 ARTERIAL: 78.3 MMHG (ref 75–108)
POTASSIUM SERPL-SCNC: 4.8 MMOL/L (ref 3.5–5.1)
RBC # BLD: 4.86 M/UL (ref 4.2–5.9)
SODIUM BLD-SCNC: 137 MMOL/L (ref 136–145)
TCO2 ARTERIAL: 31.4 MMOL/L
WBC # BLD: 9.6 K/UL (ref 4–11)

## 2021-10-18 PROCEDURE — 6360000002 HC RX W HCPCS

## 2021-10-18 PROCEDURE — 80048 BASIC METABOLIC PNL TOTAL CA: CPT

## 2021-10-18 PROCEDURE — 94761 N-INVAS EAR/PLS OXIMETRY MLT: CPT

## 2021-10-18 PROCEDURE — 2580000003 HC RX 258: Performed by: INTERNAL MEDICINE

## 2021-10-18 PROCEDURE — 36600 WITHDRAWAL OF ARTERIAL BLOOD: CPT

## 2021-10-18 PROCEDURE — 2580000003 HC RX 258

## 2021-10-18 PROCEDURE — 6370000000 HC RX 637 (ALT 250 FOR IP): Performed by: INTERNAL MEDICINE

## 2021-10-18 PROCEDURE — 93306 TTE W/DOPPLER COMPLETE: CPT

## 2021-10-18 PROCEDURE — 2060000000 HC ICU INTERMEDIATE R&B

## 2021-10-18 PROCEDURE — 99232 SBSQ HOSP IP/OBS MODERATE 35: CPT | Performed by: INTERNAL MEDICINE

## 2021-10-18 PROCEDURE — 82803 BLOOD GASES ANY COMBINATION: CPT

## 2021-10-18 PROCEDURE — 2700000000 HC OXYGEN THERAPY PER DAY

## 2021-10-18 PROCEDURE — 94660 CPAP INITIATION&MGMT: CPT

## 2021-10-18 PROCEDURE — 94640 AIRWAY INHALATION TREATMENT: CPT

## 2021-10-18 PROCEDURE — 85027 COMPLETE CBC AUTOMATED: CPT

## 2021-10-18 PROCEDURE — 36415 COLL VENOUS BLD VENIPUNCTURE: CPT

## 2021-10-18 PROCEDURE — 6360000002 HC RX W HCPCS: Performed by: INTERNAL MEDICINE

## 2021-10-18 RX ORDER — FUROSEMIDE 10 MG/ML
20 INJECTION INTRAMUSCULAR; INTRAVENOUS ONCE
Status: COMPLETED | OUTPATIENT
Start: 2021-10-18 | End: 2021-10-18

## 2021-10-18 RX ADMIN — TAMSULOSIN HYDROCHLORIDE 0.8 MG: 0.4 CAPSULE ORAL at 08:36

## 2021-10-18 RX ADMIN — CEFTRIAXONE 2000 MG: 2 INJECTION, POWDER, FOR SOLUTION INTRAMUSCULAR; INTRAVENOUS at 08:41

## 2021-10-18 RX ADMIN — METOPROLOL TARTRATE 25 MG: 25 TABLET, FILM COATED ORAL at 08:36

## 2021-10-18 RX ADMIN — IPRATROPIUM BROMIDE AND ALBUTEROL 1 PUFF: 20; 100 SPRAY, METERED RESPIRATORY (INHALATION) at 23:37

## 2021-10-18 RX ADMIN — IPRATROPIUM BROMIDE AND ALBUTEROL 1 PUFF: 20; 100 SPRAY, METERED RESPIRATORY (INHALATION) at 14:58

## 2021-10-18 RX ADMIN — GABAPENTIN 800 MG: 400 CAPSULE ORAL at 14:39

## 2021-10-18 RX ADMIN — APIXABAN 5 MG: 5 TABLET, FILM COATED ORAL at 20:28

## 2021-10-18 RX ADMIN — SODIUM CHLORIDE, PRESERVATIVE FREE 10 ML: 5 INJECTION INTRAVENOUS at 08:37

## 2021-10-18 RX ADMIN — FUROSEMIDE 20 MG: 10 INJECTION, SOLUTION INTRAMUSCULAR; INTRAVENOUS at 11:57

## 2021-10-18 RX ADMIN — ROSUVASTATIN CALCIUM 5 MG: 10 TABLET, FILM COATED ORAL at 08:37

## 2021-10-18 RX ADMIN — INSULIN GLARGINE 20 UNITS: 100 INJECTION, SOLUTION SUBCUTANEOUS at 20:40

## 2021-10-18 RX ADMIN — SODIUM CHLORIDE 25 ML: 9 INJECTION, SOLUTION INTRAVENOUS at 08:38

## 2021-10-18 RX ADMIN — MIRTAZAPINE 7.5 MG: 15 TABLET, FILM COATED ORAL at 20:27

## 2021-10-18 RX ADMIN — METOPROLOL TARTRATE 25 MG: 25 TABLET, FILM COATED ORAL at 20:27

## 2021-10-18 RX ADMIN — IPRATROPIUM BROMIDE AND ALBUTEROL 1 PUFF: 20; 100 SPRAY, METERED RESPIRATORY (INHALATION) at 17:39

## 2021-10-18 RX ADMIN — FINASTERIDE 5 MG: 5 TABLET, FILM COATED ORAL at 08:36

## 2021-10-18 RX ADMIN — DILTIAZEM HYDROCHLORIDE 240 MG: 240 CAPSULE, COATED, EXTENDED RELEASE ORAL at 08:36

## 2021-10-18 RX ADMIN — ALOGLIPTIN 12.5 MG: 12.5 TABLET, FILM COATED ORAL at 08:37

## 2021-10-18 RX ADMIN — SODIUM CHLORIDE, PRESERVATIVE FREE 10 ML: 5 INJECTION INTRAVENOUS at 20:28

## 2021-10-18 RX ADMIN — DEXTROSE MONOHYDRATE 500 MG: 50 INJECTION, SOLUTION INTRAVENOUS at 20:35

## 2021-10-18 RX ADMIN — METHYLPREDNISOLONE SODIUM SUCCINATE 40 MG: 40 INJECTION, POWDER, FOR SOLUTION INTRAMUSCULAR; INTRAVENOUS at 20:27

## 2021-10-18 RX ADMIN — IPRATROPIUM BROMIDE AND ALBUTEROL 1 PUFF: 20; 100 SPRAY, METERED RESPIRATORY (INHALATION) at 07:29

## 2021-10-18 RX ADMIN — APIXABAN 5 MG: 5 TABLET, FILM COATED ORAL at 08:36

## 2021-10-18 RX ADMIN — GABAPENTIN 800 MG: 400 CAPSULE ORAL at 20:27

## 2021-10-18 RX ADMIN — METHYLPREDNISOLONE SODIUM SUCCINATE 40 MG: 40 INJECTION, POWDER, FOR SOLUTION INTRAMUSCULAR; INTRAVENOUS at 08:36

## 2021-10-18 RX ADMIN — GABAPENTIN 800 MG: 400 CAPSULE ORAL at 08:36

## 2021-10-18 NOTE — PROGRESS NOTES
Shift assessment completed, see flow sheet. Pt is alert and oriented, appears to be in no distress at this time. Currently up in chair. On 8L/HF with diminished lung sounds, crackles on the bases. In chair with call light in reach.

## 2021-10-18 NOTE — PROGRESS NOTES
10/18/21 0310   NIV Type   Equipment Type V60   Mode Bilevel   Mask Type Nasal mask   Mask Size Large   Settings/Measurements   IPAP 18 cmH20   CPAP/EPAP 12 cmH2O   Rate Ordered 16   Resp 20   FiO2  50 %   Vt Exhaled 493 mL   Mask Leak (lpm) 28 lpm   Comfort Level Good   Using Accessory Muscles No   SpO2 98   Alarm Settings   Alarms On Y

## 2021-10-18 NOTE — FLOWSHEET NOTE
10/18/21 1038   Vital Signs   Pulse 105   Heart Rate Source Monitor   /76   BP Location Left upper arm   Patient's HR was down to the 30s. Patient was asleep. Patient states that this is not abnormal for him.  notified.

## 2021-10-18 NOTE — CARE COORDINATION
INTERDISCIPLINARY PLAN OF CARE CONFERENCE    Date/Time: 10/18/2021 2:05 PM  Completed by: Hector Casper RN, Case Management      Patient Name:  Domenica Dixon. YOB: 1955  Admitting Diagnosis: COPD exacerbation ([facility] [J44.1]  COPD with acute exacerbation ([facility] [J44.1]     Admit Date/Time:  10/16/2021  1:39 PM    Chart reviewed. Interdisciplinary team contacted or reviewed plan related to patient progress and discharge plans. Disciplines included Case Management, Nursing, and Dietitian. Current Status: IP 10/16/2021  PT/OT recommendation for discharge plan of care:       Expected D/C Disposition:  Home  Confirmed plan with patient's daughterJayla    Discharge Plan Comments: Chart reviewed. Met with pt's daughter/caregiver by phone  and explained the role of the CM. Pt spends the day alone but daughter is there at night and is requesting HHC. Referral made to [facility] for PT/OT/SN/HHA and MSW also at the request of PCP. Pt has home O2 through [facility] at 3-4 liters. Remains on 7 liters and attempting to titrate for possible in DC 1-2 days.  +CM following

## 2021-10-18 NOTE — FLOWSHEET NOTE
10/18/21 0834   Vital Signs   Temp 97.1 °F (36.2 °C)   Temp Source Oral   Pulse 97   Heart Rate Source Monitor   Resp 22   /66   BP Location Left upper arm   Patient Position Up in chair   Level of Consciousness Alert (0)   MEWS Score 2   Patient Currently in Pain Denies   Oxygen Therapy   SpO2 95 %   Pulse Oximeter Device Mode Intermittent   Pulse Oximeter Device Location Finger   O2 Device High flow nasal cannula   O2 Flow Rate (L/min) 8 L/min   Patient is resting showing no s/s of distress. Patient is alert and oriented. Meds were given, see MAR. Patient is denying any needs. Bed is in lowest position and call light is within reach. Will continue to monitor. Shift assessment complete, see flowsheets.

## 2021-10-18 NOTE — FLOWSHEET NOTE
10/18/21 1438   Vital Signs   Temp 97.2 °F (36.2 °C)   Temp Source Oral   Pulse 87   Heart Rate Source Monitor   Resp 20   /62   BP Location Left upper arm   Patient Position Up in chair   Level of Consciousness Alert (0)   MEWS Score 1   Patient Currently in Pain Denies   Oxygen Therapy   SpO2 92 %   Pulse Oximeter Device Mode Intermittent   Pulse Oximeter Device Location Finger   O2 Device High flow nasal cannula   O2 Flow Rate (L/min) 6 L/min   Patient resting. Denies any needs. Will continue to monitor.

## 2021-10-18 NOTE — CARE COORDINATION
[facility]    Referral received from  to follow for home care services. I will follow for needs, and speak with patient to verify demos. [facility] has approved and will see patient on DC. [facility] contacted for auth. The VA will need orders ASAP for auth to be started. As of now, orders are not  Written as patient isn't being DC'ed. Once written, please fax them to [phone] for approval for [facility] to see patient on DC. SN PT OT MSW HHA are needed and [facility] is aware. S3 as well. Patient lives with daughter and she is primary contact. Dr Rashad Gutierrez is the [place] PCP for the patient.         Raleigh Law  Work mobile: [phone]  [facility] office: [phone]

## 2021-10-18 NOTE — PROGRESS NOTES
10/17/21 2204   NIV Type   Skin Assessment Clean, dry, & intact   Skin Protection for O2 Device Yes   NIV Started/Stopped On   Equipment Type V60   Mode Bilevel   Mask Type Nasal mask   Mask Size Large   Settings/Measurements   IPAP 16 cmH20   CPAP/EPAP 12 cmH2O   Rate Ordered 16   Resp 24   FiO2  50 %   Vt Exhaled 498 mL   Mask Leak (lpm) 24 lpm   Comfort Level Good   Using Accessory Muscles No   SpO2 95   Alarm Settings   Alarms On Y

## 2021-10-18 NOTE — PROGRESS NOTES
Bedside report given to [facility], MELE CHILDERS. Care transferred.

## 2021-10-18 NOTE — PROGRESS NOTES
RT Inhaler-Nebulizer Bronchodilator Protocol Note    There is a bronchodilator order in the chart from a provider indicating to follow the RT Bronchodilator Protocol and there is an Initiate RT Inhaler-Nebulizer Bronchodilator Protocol order as well (see protocol at bottom of note). CXR Findings:  XR CHEST PORTABLE    Result Date: 10/16/2021  Borderline cardiomegaly with suspected pulmonary edema. The findings from the last RT Protocol Assessment were as follows:   History Pulmonary Disease: Chronic pulmonary disease  Respiratory Pattern: Dyspnea on exertion or RR 21-25 bpm  Breath Sounds: Slightly diminished and/or crackles  Cough: Strong, spontaneous, non-productive  Indication for Bronchodilator Therapy: Decreased or absent breath sounds  Bronchodilator Assessment Score: 6    Aerosolized bronchodilator medication orders have been revised according to the RT Inhaler-Nebulizer Bronchodilator Protocol below. Respiratory Therapist to perform RT Therapy Protocol Assessment initially then follow the protocol. Repeat RT Therapy Protocol Assessment PRN for score 0-3 or on second treatment, BID, and PRN for scores above 3. No Indications - adjust the frequency to every 6 hours PRN wheezing or bronchospasm, if no treatments needed after 48 hours then discontinue using Per Protocol order mode. If indication present, adjust the RT bronchodilator orders based on the Bronchodilator Assessment Score as indicated below. Use Inhaler orders unless patient has one or more of the following: on home nebulizer, not able to hold breath for 10 seconds, is not alert and oriented, cannot activate and use MDI correctly, or respiratory rate 25 breaths per minute or more, then use the equivalent nebulizer order(s) with same Frequency and PRN reasons based on the score. If a patient is on this medication at home then do not decrease Frequency below that used at home.     0-3 - enter or revise RT bronchodilator order(s) to equivalent RT Bronchodilator order with Frequency of every 4 hours PRN for wheezing or increased work of breathing using Per Protocol order mode. 4-6 - enter or revise RT Bronchodilator order(s) to two equivalent RT bronchodilator orders with one order with BID Frequency and one order with Frequency of every 4 hours PRN wheezing or increased work of breathing using Per Protocol order mode. 7-10 - enter or revise RT Bronchodilator order(s) to two equivalent RT bronchodilator orders with one order with TID Frequency and one order with Frequency of every 4 hours PRN wheezing or increased work of breathing using Per Protocol order mode. 11-13 - enter or revise RT Bronchodilator order(s) to one equivalent RT bronchodilator order with QID Frequency and an Albuterol order with Frequency of every 4 hours PRN wheezing or increased work of breathing using Per Protocol order mode. Greater than 13 - enter or revise RT Bronchodilator order(s) to one equivalent RT bronchodilator order with every 4 hours Frequency and an Albuterol order with Frequency of every 2 hours PRN wheezing or increased work of breathing using Per Protocol order mode.          Electronically signed by Chriss Prader, RCP on 10/17/2021 at 9:35 PM

## 2021-10-18 NOTE — PROGRESS NOTES
etiology.  Suggest   follow-up or PET scan correlation     ADDENDUM: This is correlated with a prior CT chest from 04/30/2019. The previously described mediastinal lymph nodes are slightly larger and    More numerous. Suggest a follow-up PET scan. The hazy ground-glass opacities scattered along the upper lobes and lung   bases posteriorly which is more prominent with small bibasilar pleural   effusions which are more prominent. ASSESSMENT:  · Abnormal CT: Previous evidence of air trapping/groundglass opacities on 4/30/2019 CT. Paratracheal lymph nodes appear similar, slightly enlarged over 2 year period per radiology  · 2019 PET CT mild activity in enlarged right paratracheal lymph node. Has previously seen [facility] and most recently Dr. Lety Winchester @ [facility]  · Stage IV prostate cancer, with mets to bladder & possible pelvic lymph node, s/p radiation  · Acute on chronic hypoxemic respiratory failure with hypercapnia, 4 L baseline. Saw Dr. Skinny Phillips 5/2020  · COPD, severe, with exacerbation  · Chronic cor pulmonale with RV dysfunction and pulmonary HTN  · PAfib, on Eliquis &  Cardizem  · MARIA ELENA on home CPAP x 30 yrs, f/b [facility] - did not have CPAP on first night   · Tobacco abuse, 50+ pack year history  · Vaccinated for COVID19    PLAN:  Supplemental O2 to maintain SaO2 >92%; wean as tolerated    Azithromycin D#3 & Ceftriaxone D#2  IV solumedrol 40 BID  Inhaled bronchodilators   F/U for imaging abnormality with Dr. Skinny Phillips   · I reviewed CPAP use: 13 cm water; >9 hours on average, AHI approximately 2. With persistent hypercapnic respiratory failure despite CPAP use, I recommend changing to BIPAP 18/12. ABG improved this am. [facility]. If he cannot obtain prior to leaving the hospital, it is okay to d/c him to use his regular CPAP until BiPAP is obtained.    · Echo pending   · Tobacco cessation

## 2021-10-19 VITALS
TEMPERATURE: 96.4 F | WEIGHT: 314.8 LBS | DIASTOLIC BLOOD PRESSURE: 72 MMHG | OXYGEN SATURATION: 95 % | SYSTOLIC BLOOD PRESSURE: 124 MMHG | HEIGHT: 72 IN | BODY MASS INDEX: 42.64 KG/M2 | HEART RATE: 84 BPM | RESPIRATION RATE: 16 BRPM

## 2021-10-19 DIAGNOSIS — J96.12 CHRONIC HYPERCAPNIC RESPIRATORY FAILURE (HCC): Primary | ICD-10-CM

## 2021-10-19 LAB
GLUCOSE BLD-MCNC: 331 MG/DL (ref 70–99)
GLUCOSE BLD-MCNC: 363 MG/DL (ref 70–99)
GLUCOSE BLD-MCNC: 380 MG/DL (ref 70–99)
PERFORMED ON: ABNORMAL

## 2021-10-19 PROCEDURE — 6360000002 HC RX W HCPCS

## 2021-10-19 PROCEDURE — 94660 CPAP INITIATION&MGMT: CPT

## 2021-10-19 PROCEDURE — 99239 HOSP IP/OBS DSCHRG MGMT >30: CPT | Performed by: INTERNAL MEDICINE

## 2021-10-19 PROCEDURE — 94761 N-INVAS EAR/PLS OXIMETRY MLT: CPT

## 2021-10-19 PROCEDURE — 2580000003 HC RX 258: Performed by: INTERNAL MEDICINE

## 2021-10-19 PROCEDURE — 2580000003 HC RX 258

## 2021-10-19 PROCEDURE — 97166 OT EVAL MOD COMPLEX 45 MIN: CPT

## 2021-10-19 PROCEDURE — 2700000000 HC OXYGEN THERAPY PER DAY

## 2021-10-19 PROCEDURE — 97110 THERAPEUTIC EXERCISES: CPT

## 2021-10-19 PROCEDURE — 6360000002 HC RX W HCPCS: Performed by: INTERNAL MEDICINE

## 2021-10-19 PROCEDURE — 97530 THERAPEUTIC ACTIVITIES: CPT

## 2021-10-19 PROCEDURE — 99232 SBSQ HOSP IP/OBS MODERATE 35: CPT | Performed by: INTERNAL MEDICINE

## 2021-10-19 PROCEDURE — 97535 SELF CARE MNGMENT TRAINING: CPT

## 2021-10-19 PROCEDURE — 6370000000 HC RX 637 (ALT 250 FOR IP): Performed by: INTERNAL MEDICINE

## 2021-10-19 PROCEDURE — 97162 PT EVAL MOD COMPLEX 30 MIN: CPT

## 2021-10-19 PROCEDURE — 94640 AIRWAY INHALATION TREATMENT: CPT

## 2021-10-19 RX ORDER — LEVOFLOXACIN 750 MG/1
750 TABLET ORAL DAILY
Qty: 9 TABLET | Refills: 0 | Status: SHIPPED | OUTPATIENT
Start: 2021-10-19 | End: 2021-10-28

## 2021-10-19 RX ORDER — AZITHROMYCIN 250 MG/1
250 TABLET, FILM COATED ORAL EVERY 24 HOURS
Status: DISCONTINUED | OUTPATIENT
Start: 2021-10-19 | End: 2021-10-19

## 2021-10-19 RX ORDER — ALBUTEROL SULFATE 90 UG/1
2 AEROSOL, METERED RESPIRATORY (INHALATION) EVERY 6 HOURS PRN
Qty: 18 G | Refills: 1 | Status: SHIPPED | OUTPATIENT
Start: 2021-10-19

## 2021-10-19 RX ORDER — ALBUTEROL SULFATE 2.5 MG/3ML
2.5 SOLUTION RESPIRATORY (INHALATION) EVERY 6 HOURS PRN
Qty: 120 EACH | Refills: 0
Start: 2021-10-19

## 2021-10-19 RX ORDER — FINASTERIDE 5 MG/1
5 TABLET, FILM COATED ORAL DAILY
Qty: 1 TABLET | Refills: 0
Start: 2021-10-19

## 2021-10-19 RX ORDER — MIRTAZAPINE 15 MG/1
7.5 TABLET, FILM COATED ORAL NIGHTLY
Qty: 1 TABLET | Refills: 0
Start: 2021-10-19

## 2021-10-19 RX ORDER — PREDNISONE 20 MG/1
TABLET ORAL
Qty: 13 TABLET | Refills: 0 | Status: SHIPPED | OUTPATIENT
Start: 2021-10-19

## 2021-10-19 RX ADMIN — ROSUVASTATIN CALCIUM 5 MG: 10 TABLET, FILM COATED ORAL at 08:24

## 2021-10-19 RX ADMIN — TAMSULOSIN HYDROCHLORIDE 0.8 MG: 0.4 CAPSULE ORAL at 08:24

## 2021-10-19 RX ADMIN — METHYLPREDNISOLONE SODIUM SUCCINATE 40 MG: 40 INJECTION, POWDER, FOR SOLUTION INTRAMUSCULAR; INTRAVENOUS at 08:23

## 2021-10-19 RX ADMIN — DILTIAZEM HYDROCHLORIDE 240 MG: 240 CAPSULE, COATED, EXTENDED RELEASE ORAL at 08:24

## 2021-10-19 RX ADMIN — GABAPENTIN 800 MG: 400 CAPSULE ORAL at 13:18

## 2021-10-19 RX ADMIN — CEFTRIAXONE 2000 MG: 2 INJECTION, POWDER, FOR SOLUTION INTRAMUSCULAR; INTRAVENOUS at 08:29

## 2021-10-19 RX ADMIN — APIXABAN 5 MG: 5 TABLET, FILM COATED ORAL at 08:23

## 2021-10-19 RX ADMIN — IPRATROPIUM BROMIDE AND ALBUTEROL 1 PUFF: 20; 100 SPRAY, METERED RESPIRATORY (INHALATION) at 07:35

## 2021-10-19 RX ADMIN — IPRATROPIUM BROMIDE AND ALBUTEROL 1 PUFF: 20; 100 SPRAY, METERED RESPIRATORY (INHALATION) at 11:23

## 2021-10-19 RX ADMIN — METOPROLOL TARTRATE 25 MG: 25 TABLET, FILM COATED ORAL at 08:23

## 2021-10-19 RX ADMIN — SODIUM CHLORIDE, PRESERVATIVE FREE 10 ML: 5 INJECTION INTRAVENOUS at 08:24

## 2021-10-19 RX ADMIN — GABAPENTIN 800 MG: 400 CAPSULE ORAL at 08:23

## 2021-10-19 RX ADMIN — CEFEPIME 2000 MG: 2 INJECTION, POWDER, FOR SOLUTION INTRAVENOUS at 10:34

## 2021-10-19 RX ADMIN — ALOGLIPTIN 12.5 MG: 12.5 TABLET, FILM COATED ORAL at 08:24

## 2021-10-19 RX ADMIN — FINASTERIDE 5 MG: 5 TABLET, FILM COATED ORAL at 08:23

## 2021-10-19 NOTE — PROGRESS NOTES
RT Inhaler-Nebulizer Bronchodilator Protocol Note    There is a bronchodilator order in the chart from a provider indicating to follow the RT Bronchodilator Protocol and there is an Initiate RT Inhaler-Nebulizer Bronchodilator Protocol order as well (see protocol at bottom of note). CXR Findings:  No results found. The findings from the last RT Protocol Assessment were as follows:   History Pulmonary Disease: Chronic pulmonary disease  Respiratory Pattern: Dyspnea on exertion or RR 21-25 bpm  Breath Sounds: Slightly diminished and/or crackles  Cough: Strong, spontaneous, non-productive  Indication for Bronchodilator Therapy: Decreased or absent breath sounds  Bronchodilator Assessment Score: 6    Aerosolized bronchodilator medication orders have been revised according to the RT Inhaler-Nebulizer Bronchodilator Protocol below. Respiratory Therapist to perform RT Therapy Protocol Assessment initially then follow the protocol. Repeat RT Therapy Protocol Assessment PRN for score 0-3 or on second treatment, BID, and PRN for scores above 3. No Indications - adjust the frequency to every 6 hours PRN wheezing or bronchospasm, if no treatments needed after 48 hours then discontinue using Per Protocol order mode. If indication present, adjust the RT bronchodilator orders based on the Bronchodilator Assessment Score as indicated below. Use Inhaler orders unless patient has one or more of the following: on home nebulizer, not able to hold breath for 10 seconds, is not alert and oriented, cannot activate and use MDI correctly, or respiratory rate 25 breaths per minute or more, then use the equivalent nebulizer order(s) with same Frequency and PRN reasons based on the score. If a patient is on this medication at home then do not decrease Frequency below that used at home.     0-3 - enter or revise RT bronchodilator order(s) to equivalent RT Bronchodilator order with Frequency of every 4 hours PRN for wheezing or increased work of breathing using Per Protocol order mode. 4-6 - enter or revise RT Bronchodilator order(s) to two equivalent RT bronchodilator orders with one order with BID Frequency and one order with Frequency of every 4 hours PRN wheezing or increased work of breathing using Per Protocol order mode. 7-10 - enter or revise RT Bronchodilator order(s) to two equivalent RT bronchodilator orders with one order with TID Frequency and one order with Frequency of every 4 hours PRN wheezing or increased work of breathing using Per Protocol order mode. 11-13 - enter or revise RT Bronchodilator order(s) to one equivalent RT bronchodilator order with QID Frequency and an Albuterol order with Frequency of every 4 hours PRN wheezing or increased work of breathing using Per Protocol order mode. Greater than 13 - enter or revise RT Bronchodilator order(s) to one equivalent RT bronchodilator order with every 4 hours Frequency and an Albuterol order with Frequency of every 2 hours PRN wheezing or increased work of breathing using Per Protocol order mode.          Electronically signed by Eliseo Ho RCP on 10/18/2021 at 10:30 PM

## 2021-10-19 NOTE — PROGRESS NOTES
Inpatient Physical Therapy Evaluation and Treatment    Unit: PCU  Date:  10/19/2021  Patient Name:    Jimbo Kimbrough. Admitting diagnosis:  COPD exacerbation ([facility] [J44.1]  COPD with acute exacerbation ([facility] [J44.1]  Admit Date:  10/16/2021  Precautions/Restrictions/WB Status/ Lines/ Wounds/ Oxygen: Bed/chair alarm, Lines -IV and Supplemental O2 (5.5), Telemetry and Continuous pulse oximetry    Treatment Time:  08:20-08:53  Treatment Number:  1   Timed Code Treatment Minutes: 23 minutes  Total Treatment Minutes:  33  minutes    Patient Goals for Therapy: \" get back home\"          Discharge Recommendations: Home PRN assist   DME needs for discharge: Needs Met       Therapy recommendation for EMS Transport: can transport by wheelchair    Therapy recommendations for staff:   Supervision with use of No AD for all transfers and ambulation to/from chair  to/from bathroom    History of Present Illness: Per H&P Dr. Yecenia Jerez 10/17: \"The patient is a 78 y.o. male with asthma, atrial fibrillation anticoagulated on Eliquis, COPD, chronic hypoxic respiratory failure, diabetes, MARIA ELENA and history of metastatic prostate cancer who presented to [facility] ED with complaint of shortness of breath of a couple days duration. Admits to having minimal cough. Denies any fever or chills. No chest pain. No swelling in his lower extremities. He was hypoxic in the emergency room requiring about 6 L of oxygen. Currently is on 10 L. He uses 4 L of oxygen at home. Rapid COVID-19 test negative. \"    Home Health S4 Level Recommendation:  NA  AM-PAC Mobility Score    AM-PAC Inpatient Mobility Raw Score : 21  AM-PAC Inpatient Mobility without Stair Climbing Raw Score : 18    Preadmission Environment     Pt.  Lives with dtr and grandson and great grand dtr , Dtr and grandson work (do not have 24/7 A)  Home environment:    split level home  Steps to enter first floor: one  Steps to enter and One Hand rail  Steps to second floor: Partial flight and One Hand Rail down -where pts bed room/ bathroom , kitchen on upper level   Bathroom: Bath Tub Shower standard commode   Equipment owned: SPC, Rolling Walker, Rollator , Shower Chair, BSC,- all wifes ( who passed away)  home O2 ( 4 L) PRN, pulse ox and inhaler, nebulizer, hospital bed      Preadmission Status:  Pt. Able to drive: Yes  Pt Fully independent with ADLs: Yes except dtr assists with LB dressing due to back problems   Pt. Required assistance from family for: Cleaning,and [place], Pt will use the microwave,. Pts sister will occasionally stop by and do some cooking   Pt. Fully independent for transfers and gait and walked with a cane at times due to knee problems    History of falls yes slid out of bed and another fall     Pain   No  Location: NA  Rating: NA /10  Pain Medicine Status: No request made    Cognition    A&O x4   Able to follow 2 step commands    Subjective  Patient sitting up in chair with no family present. RN in room administering morning meds. Pt agreeable to this PT eval & tx. Upper Extremity ROM/Strength  Please see OT evaluation. Lower Extremity ROM / Strength   AROM WFL: Yes    Strength Assessment (measured on a 0-5 scale):  R LE   Quad   5   Ant Tib  5   Hamstring 5   Iliopsoas 5  L LE  Quad   5   Ant Tib  5   Hamstring 5   Iliopsoas 5    Lower Extremity Sensation    WFL    Lower Extremity Proprioception:   WFL    Coordination and Tone  WFL    Balance  Sitting:  Normal; Independent  Comments: sitting at edge of chair~5 minutes during assessment    Standing: Good ; Supervision  Comments: Supervision provided due to presence of multiple lines. No balance deficits noted. Able to turn either direction.      Bed Mobility   Supine to Sit:    Not Tested  Sit to Supine:   Not Tested  Rolling:   Not Tested  Scooting in sitting: Not Tested  Scooting in supine:  Not Tested    Transfer Training     Sit to stand:   Supervision  Stand to sit:   Supervision  Bed to Chair:   Not Tested with use of N/A    Gait gait completed as indicated below  Distance:      20 ft + standing rest break at toilet + 20 ft  Deviations (firm surface/linoleum):  decreased antoinette and decreased step length bilaterally  Assistive Device Used:    No AD  Level of Assist:    Supervision  Comment: Pt managed O2 line indep. Stair Training Deferred. Pt likely with sufficient strength and ROM for completing steps at home. Will evaluate activity tolerance NV. # of Steps:   N/A  Level of Assist:  N/A  UE Support:  NA  Assistive Device:  N/A  Pattern:   N/A  Comments: N/A    Activity Tolerance   Pt completed therapy session with No adverse symptoms noted w/activity  At rest Sp02 96% , Hr 86 on 5.5 liters   Seated after amb to bathroom SpO2 92% HR 94    Positioning Needs   Pt up in chair, no alarm needed, positioned in proper neutral alignment and pressure relief provided. Call light provided and all needs within reach    Exercises Initiated  Julienne deferred secondary to treatment focus on functional mobility  NA    Other  None. Patient/Family Education   Pt educated on role of inpatient PT, POC, importance of continued activity, pacing activity and calling for assist with mobility. Assessment  Pt seen for Physical Therapy evaluation in acute care setting. Pt being seen in hospital following admission with acute on chronic COPD. PTA pt was independent with most ADLs and all household functional mobility. Upon evaluation pt demonstrates good functional strength and balance. Pt at or very near his baseline. Will continue to follow in acute setting to promote increased mobility and monitor response to activity. Will assess stairs NV. Recommending Home PRN assist upon discharge as patient functioning close to baseline level    Goals : To be met in 3 visits:  1). Independent with LE Ex x 10 reps    To be met in 6 visits:  1). Supine to/from sit: Independent  2). Sit to/from stand: Independent  3).   Bed to chair: Independent  4). Gait: Ambulate 100 ft.   with  Independent and use of No AD  5). Tolerate B LE exercises 3 sets of 10-15 reps  6). Ascend/descend 7 steps with Supervision with use of hand rail unilateral and No AD    Rehabilitation Potential: Good  Strengths for achieving goals include:   Pt motivated, PLOF, Family Support and Pt cooperative   Barriers to achieving goals include:    No Barriers    Plan    To be seen 2-3 x / week  while in acute care setting for therapeutic exercises, bed mobility, transfers, progressive gait training, balance training, and family/patient education. Signature: Phillip Clemens PT, DPT      If patient discharges from this facility prior to next visit, this note will serve as the Discharge Summary.

## 2021-10-19 NOTE — PROGRESS NOTES
Admit: 10/16/2021    Name:  Libia Space  Room:  [place] [id number]  MRN:    [id number]     Daily Progress Note for 10/19/2021   Admitted with acute on chronic hypoxic respiratory failure acute COPD exacerbation    Uses 4 L at home    Interval History:     Used bipap all night  Now on 5.5 L   Rare growth of Pseudomonas in sputum cultures    Scheduled Meds:   insulin lispro  0-12 Units SubCUTAneous TID WC    insulin lispro  0-6 Units SubCUTAneous Nightly    cefTRIAXone (ROCEPHIN) IV  2,000 mg IntraVENous Q24H    methylPREDNISolone  40 mg IntraVENous Q12H    albuterol-ipratropium  1 puff Inhalation Q4H While awake    apixaban  5 mg Oral BID    insulin glargine  20 Units SubCUTAneous Nightly    dilTIAZem  240 mg Oral Daily    empagliflozin  25 mg Oral Daily    finasteride  5 mg Oral Daily    gabapentin  800 mg Oral TID    metoprolol tartrate  25 mg Oral BID    mirtazapine  7.5 mg Oral Nightly    rosuvastatin  5 mg Oral Daily    alogliptin  12.5 mg Oral Daily    tamsulosin  0.8 mg Oral Daily    sodium chloride flush  5-40 mL IntraVENous 2 times per day       Continuous Infusions:   dextrose      sodium chloride 25 mL (10/18/21 0838)       PRN Meds:  glucose, dextrose, glucagon (rDNA), dextrose, sodium chloride flush, sodium chloride, ondansetron **OR** ondansetron, polyethylene glycol, acetaminophen **OR** acetaminophen, perflutren lipid microspheres, ipratropium-albuterol                  Objective:     Temp  Av.8 °F (36 °C)  Min: 96.4 °F (35.8 °C)  Max: 97.2 °F (36.2 °C)  Pulse  Av.8  Min: 35  Max: 105  BP  Min: 109/62  Max: 128/71  SpO2  Av.9 %  Min: 92 %  Max: 97 %  FiO2   Av %  Min: 50 %  Max: 50 %  Patient Vitals for the past 4 hrs:   BP Temp Temp src Pulse Resp SpO2   10/19/21 0822 124/72 96.4 °F (35.8 °C) Oral 84 16 94 %   10/19/21 0737 -- -- -- -- -- 96 %         Intake/Output Summary (Last 24 hours) at 10/19/2021 0936  Last data filed at 10/19/2021 0556  Gross per 24 hour Intake 1205 ml   Output 2975 ml   Net -1770 ml       Physical Exam:  Gen: No distress. Alert. Eyes: PERRL. No sclera icterus. No conjunctival injection. ENT: No discharge. Pharynx clear. Neck: No JVD. No Carotid Bruit. Trachea midline. Resp: No accessory muscle use. Bibasilar crackles. No wheezes. No rhonchi. CV: Regular rate. Regular rhythm. No murmur. No rub. No edema. Capillary Refill: Brisk,< 3 seconds   Peripheral Pulses: +2 palpable, equal bilaterally   GI: Non-tender. Non-distended. No masses. No organomegaly. Normal bowel sounds. No hernia. Skin: Warm and dry. No nodule on exposed extremities. No rash on exposed extremities. M/S: No cyanosis. No joint deformity. No clubbing. Neuro: Awake. Grossly nonfocal    Psych: Oriented x 3. No anxiety or agitation. Lab Data:  CBC:   Recent Labs     10/16/21  1402 10/17/21  0518 10/18/21  0429   WBC 5.5 5.6 9.6   RBC 5.01 4.83 4.86   HGB 15.3 15.0 15.1   HCT 49.0 46.6 46.9   MCV 97.7 96.5 96.5   RDW 17.6* 17.1* 17.1*   * 118* 114*     BMP:   Recent Labs     10/16/21  1402 10/17/21  0518 10/18/21  0429    136 137   K 4.4 5.6* 4.8   CL 96* 96* 95*   CO2 30 29 29   BUN 14 16 27*   CREATININE 1.2 1.2 1.1     BNP: No results for input(s): BNP in the last 72 hours. PT/INR: No results for input(s): PROTIME, INR in the last 72 hours. APTT:No results for input(s): APTT in the last 72 hours. CARDIAC ENZYMES:   Recent Labs     10/16/21  1402   TROPONINI <0.01     FASTING LIPID PANEL:  Lab Results   Component Value Date    CHOL 168 01/01/2019    HDL 29 01/01/2019    HDL 38 03/14/2011    TRIG 309 01/01/2019     LIVER PROFILE:   Recent Labs     10/16/21  1402   AST 17   ALT 13   BILITOT 1.3*   ALKPHOS 88         CT Head WO Contrast   Final Result      No evidence for acute intracranial hemorrhage, territorial infarction or   intracranial mass lesion. Mild chronic microangiopathic ischemic disease. Mild generalized volume loss. Complete opacification of left maxillary sinus. CT CHEST PULMONARY EMBOLISM W CONTRAST   Final Result   Addendum 1 of 1   ADDENDUM:   This is correlated with a prior CT chest from 04/30/2019. The previously described mediastinal lymph nodes are slightly larger and    more   numerous. Suggest a follow-up PET scan. The hazy ground-glass opacities scattered along the upper lobes and lung   bases posteriorly which is more prominent with small bibasilar pleural   effusions which are more prominent. Final   Prominent central pulmonary vessels suggestive of pulmonary artery   hypertension or venous congestion with no pulmonary emboli. Mildly dilated and atherosclerotic thoracic aorta with no aneurysm or   dissection. Hazy ground-glass opacities along the upper lobes extending inferiorly which   could represent early multisegmental bronchopneumonia. This pattern of   pneumonia has been described with COVID-19 disease. Small bibasilar pleural effusions and associated bibasilar atelectasis or   infiltrates posteriorly extending into the upper chest.      Mild mediastinal adenopathy which is of uncertain etiology. Suggest   follow-up or PET scan correlation            XR CHEST PORTABLE   Final Result   Borderline cardiomegaly with suspected pulmonary edema. ECHO-   EF estimated at 55-60%. No regional wall motion abnormalities are noted. There is mild concentric left ventricular hypertrophy. Normal left ventricular diastolic filling pressure. The right ventricle is moderately enlarged. Right ventricular systolic function is mildly reduced . The right atrium is moderately dilated. The aortic root is mildly dilated. Mild mitral regurgitation. Mild tricuspid regurgitation. Systolic pulmonary artery pressure (SPAP) estimated at 43 mmHg (RA pressure   15 mmHg), consistent with mild pulmonary hypertension.     Assessment & Plan:     Patient Active Problem List Diagnosis Date Noted    Abnormal chest CT     Prostate cancer (HCC)     COPD exacerbation (HCC) 10/16/2021    COPD, severe ([address] 11/06/2019    Tobacco abuse 11/06/2019    Chronic diastolic congestive heart failure (HCC)     Chronic anticoagulation     Atrial fibrillation with RVR (HCC)     Acute on chronic respiratory failure with hypoxia (HCC) 07/26/2019    Acute on chronic respiratory failure with hypoxia and hypercapnia (HCC) 07/26/2019    Acute cor pulmonale (HCC)     Chronic obstructive pulmonary disease with acute exacerbation (HCC)     Chronic respiratory failure with hypoxia (HCC)     Atrial fibrillation, controlled ([address]     Chronic venous hypertension involving both sides 03/01/2019    Pulmonary hypertension (HCC)     Cor pulmonale, chronic (HCC)     Type 2 diabetes mellitus with hyperglycemia (HCC)     Metabolic acidosis 69/88/8665    Hyperkalemia 06/06/2017    Acute pulmonary edema (HCC) 06/06/2017    BPH (benign prostatic hyperplasia) 06/06/2017    Acute urinary retention 06/06/2017    Acute on chronic respiratory failure (HCC)     Obstructive sleep apnea     ARF (acute renal failure) ([address] 06/05/2017    Ventral hernia 03/14/2011    Increased BMI 09/27/2010    Essential hypertension 09/27/2010    Diabetes mellitus ([address] 09/27/2010     Groundglass opacities in both lungs on CT. COPD acute exacerbation  Acute on chronic hypoxic respiratory failure  -Presented to the ED with shortness of breath  -Chest x-ray with borderline cardiomegaly with suspected pulmonary edema-> CTPA negative for PE  Rapid COVID-19 test negative. Obtain COVID-19 PCR- negative  -Sputum culture pending  Continue Rocephin and azithromycin day #3.  -Uses 4 L NC O2 at baseline--> now requiring up to 10 L-- 6 L --> 5.5 L   -Continue Solu-Medrol (hx of BG being very sensitive to steroids, monitor closely), albuterol/DuoNebs  Used bipap last night.  ABG this am looks better   -Pulmonology consult, follows with Dr. Dimitris Ahuja outpatient  Regrowth of Pseudomonas in respiratory culture DC Rocephin and start cefepime. Dc on levaquin     Possible new onset/Acute CHF exacerbation  Pulmonary hypertension  -Most recent echo from 2019 with EF greater than 65% with asynchronous septal wall motion with moderate LVH and normal diastolic function, reduced RV function and moderate pulmonary hypertension  -BNP mildly elevated and imaging showing pulmonary edema  -Check echo- normal EF   -IV Lasix 40 mg x 1 dose given in the ED, start IV Lasix 20 mg x 1  -Daily weights, monitor I's and O's, low-sodium diet        Paroxysmal atrial fibrillation  -AC on Eliquis, continue  -Rates mildly over 100 but otherwise controlled  -Continue home Cardizem, beta-blocker  -Continue telemetry monitor     Thrombocytopenia  -Baseline PLT~120-140  -PLT on admission 122  -Anticoagulated on Eliquis  -No evidence of acute bleeding  -Monitor, stable     DM 2 with neuropathy  -Continue home oral medications  -Continue gabapentin  -BG is controlled  -Continue SSI at home nightly Lantus     Stage IV prostate cancer  -Followed by [facility]    -With mets to the bladder and possible pelvic lymph node  -S/p radiation  Not on chemotherapy or antiandrogen therapy now     HLD  - Continue statin      MARIA ELENA  -Continue home CPAP     DVT Prophylaxis: Eliquis   Diet: ADULT DIET;  Regular; 4 carb choices (60 gm/meal)  Code Status: Full Code      Dc home   Shahrzad Hyde MD

## 2021-10-19 NOTE — PROGRESS NOTES
Shift assessment completed, see flow sheet. Pt is alert and oriented, appears to be in no distress. Remains on 5.5L/HF to maintain Sp02 above 92%. Bipap QHS. Remains in chair with call light in reach.

## 2021-10-19 NOTE — ED PROVIDER NOTES
I independently interviewed, examined and evaluated Hans Abreu. .    In brief, this is a 79-year-old male with a past medical history of chronic respiratory failure, COPD, diabetes, history of prostate cancer not currently undergoing treatment, CHF, and atrial fibrillation on Eliquis who presents with shortness of breath. He lives at home with his daughter who was concerned regarding his worsening shortness of breath and apparently he had 3 episodes of confusion when he tried to walk to the restroom, as well as 3 falls associated with this. The patient does not think he hit his head however he is on Eliquis and he was reportedly confused when he fell. He states that he would rather H. He denies fevers or chills. He does have a change in his sputum. He describes orthopnea, but denies increased swelling. On exam, he is chronically ill-appearing. He is awake and alert. GCS is 15. He is mildly tachypneic without accessory muscle use or conversational dyspnea. Breath sounds are diminished diffusely with faint expiratory wheezes, no crackles or rhonchi. No significant peripheral edema. Heart is tachycardic rate and irregularly irregular rhythm. The Ekg interpreted by me shows  atrial fibrillation with a rate of 101  Axis is   Right axis deviation  QTc is  within an acceptable range  Intervals and Durations are unremarkable. ST Segments: nonspecific changes no ischemia  No significant change from prior EKG dated 7/26/19      ED course: This is a 79-year-old male presenting with shortness of breath in the setting of chronic respiratory failure. He is chronically ill-appearing on exam. He is desaturating into the high 80s on his baseline nasal cannula, does not necessarily appear to be in distress however he is maxed out to 6 L nasal cannula he is given duo nebs and oxygen saturations did improve to about 90%.  He is a bit hypercapnic, however is fully alert and oriented and therefore I do not feel that he needed to be placed on BiPAP. Given his history of cancer, immobility and lack of overt findings on chest x-ray or exam I did opt to do CT PE. This was ultimately negative for PE. I discussed steroids with patient's daughters, they are concerned regarding his blood sugars however I do feel he requires a maneuver understanding of this. He was given one-time dose of Lasix here in the ED as well. He will be admitted for further treatment. The total Critical Care time is 35 minutes which excludes separately billable procedures. All diagnostic, treatment, and disposition decisions were made by myself in conjunction with the advanced practice provider. For all further details of the patient's emergency department visit, please see the advanced practice provider's documentation. Comment: Please note this report has been produced using speech recognition software and may contain errors related to that system including errors in grammar, punctuation, and spelling, as well as words and phrases that may be inappropriate. If there are any questions or concerns please feel free to contact the dictating provider for clarification.          Dion Medeiros, [address] [place] [address]  10/19/21 7713

## 2021-10-19 NOTE — PROGRESS NOTES
PULMONARY PROGRESS NOTE  Hospital Day: 4   CC: COPD, shortness of breath    Subjective: Wore BiPAP overnight. Has felt back to normal for the last 2 days and is ready to go home. Felt better with ambulation. IV line: Peripheral    PHYSICAL EXAM:   /71   Pulse 87   Temp 96.5 °F (35.8 °C) (Oral)   Resp 16   Ht 6' (1.829 m)   Wt (!) 314 lb 12.8 oz (142.8 kg)   SpO2 92%   BMI 42.69 kg/m²  on 5.5 L, I turned down to 5 L   Constitutional:  No acute distress. Morbidly obese. HEENT:  No scleral icterus  Neck:  No tracheal deviation present. Cardiovascular:  Normal heart sounds. Pulmonary/Chest:  No wheezes. No rhonchi. No rales. No decreased breath sounds. No accessory muscle usage or stridor. Abdominal:  Soft. Musculoskeletal:  No cyanosis. No clubbing.   Skin:  Skin is warm and dry. + bruise on left lateral mid back    Scheduled Meds:   insulin lispro  0-12 Units SubCUTAneous TID WC    insulin lispro  0-6 Units SubCUTAneous Nightly    cefTRIAXone (ROCEPHIN) IV  2,000 mg IntraVENous Q24H    methylPREDNISolone  40 mg IntraVENous Q12H    albuterol-ipratropium  1 puff Inhalation Q4H While awake    apixaban  5 mg Oral BID    insulin glargine  20 Units SubCUTAneous Nightly    dilTIAZem  240 mg Oral Daily    empagliflozin  25 mg Oral Daily    finasteride  5 mg Oral Daily    gabapentin  800 mg Oral TID    metoprolol tartrate  25 mg Oral BID    mirtazapine  7.5 mg Oral Nightly    rosuvastatin  5 mg Oral Daily    alogliptin  12.5 mg Oral Daily    tamsulosin  0.8 mg Oral Daily    sodium chloride flush  5-40 mL IntraVENous 2 times per day     Continuous Infusions:   dextrose      sodium chloride 25 mL (10/18/21 0838)     PRN Meds:  glucose, dextrose, glucagon (rDNA), dextrose, sodium chloride flush, sodium chloride, ondansetron **OR** ondansetron, polyethylene glycol, acetaminophen **OR** acetaminophen, perflutren lipid microspheres, ipratropium-albuterol    Labs:  CBC:   Recent Labs 10/16/21  1402 10/17/21  0518 10/18/21  0429   WBC 5.5 5.6 9.6   HGB 15.3 15.0 15.1   HCT 49.0 46.6 46.9   MCV 97.7 96.5 96.5   * 118* 114*     BMP:   Recent Labs     10/16/21  1402 10/17/21  0518 10/18/21  0429    136 137   K 4.4 5.6* 4.8   CL 96* 96* 95*   CO2 30 29 29   BUN 14 16 27*   CREATININE 1.2 1.2 1.1     Micro:   10/16/2021 blood cultures negative  10/17/2021 SARS-CoV-2 and influenza PCR negative    Imaging: Chest imaging was reviewed by me and showed:    TTEcho 10/16/21  EF 55 to 60%  Mild concentric LVH. Normal diastolic filling pressures. RV systolic function mildly reduced with moderately dilated RA  SPAP estimated at 43 mmHg, c/w mild pulmonary HTN    CTPA 10/16/21  By me: Previous evidence of air trapping/groundglass opacities on 4/30/2019 CT. Paratracheal lymph nodes appear similar. Mediastinum: There are multiple small lymph nodes along the pretracheal   region and AP window with the largest seen along the jacqueline measuring 2 cm. The heart and pericardium demonstrate no acute abnormality.  The aorta is   well opacified and mildly dilated with calcified plaque throughout. Shelda Clock is   no aneurysm or dissection.       Lungs/pleura:  There are hazy ground-glass opacities scattered throughout the   upper lobes extending inferiorly into the lung bases.  There are small   bibasilar pleural effusions with bibasilar atelectasis or infiltrates   posteriorly.  No pulmonary nodule or mass can be seen     Impression   Prominent central pulmonary vessels suggestive of pulmonary artery   hypertension or venous congestion with no pulmonary emboli.       Mildly dilated and atherosclerotic thoracic aorta with no aneurysm or   dissection.       Hazy ground-glass opacities along the upper lobes extending inferiorly which   could represent early multisegmental bronchopneumonia.  This pattern of   pneumonia has been described with COVID-19 disease.       Small bibasilar pleural effusions and associated bibasilar atelectasis or   infiltrates posteriorly extending into the upper chest.       Mild mediastinal adenopathy which is of uncertain etiology.  Suggest   follow-up or PET scan correlation     ADDENDUM: This is correlated with a prior CT chest from 04/30/2019. The previously described mediastinal lymph nodes are slightly larger and    More numerous. Suggest a follow-up PET scan. The hazy ground-glass opacities scattered along the upper lobes and lung   bases posteriorly which is more prominent with small bibasilar pleural   effusions which are more prominent. ASSESSMENT:  · Abnormal CT: Previous evidence of air trapping/groundglass opacities on 4/30/2019 CT. Paratracheal lymph nodes appear similar, slightly enlarged over 2 year period per radiology  · 2019 PET CT mild activity in enlarged right paratracheal lymph node. [facility] and most recently Dr. Zayda Eugene @ [facility]  · Stage IV prostate cancer, with mets to bladder & possible pelvic lymph node, s/p radiation  · Chronic hypoxemic respiratory failure with hypercapnia, 4 L baseline. Saw Dr. Juana Mcelroy 5/2020. Had acute hypercapnic respiratory failure which was life threatening. · COPD, severe, with exacerbation  · Resp cx: Light pseudomonas growth. No hx of this in Epic. · Chronic cor pulmonale with RV dysfunction and pulmonary HTN  · PAfib, on Eliquis &  Cardizem  · MARIA ELENA on home CPAP x 30 yrs, f/[facility] - did not have CPAP on first night   · Tobacco abuse, 50+ pack year history  · Vaccinated for COVID19    PLAN:  Supplemental O2 to maintain SaO2 >92%; wean as tolerated    Completed 3 days Azithromycin 500. On Ceftriaxone D#3  IV solumedrol 40 BID  Inhaled bronchodilators   Echo completed  Tobacco cessation  · I reviewed CPAP use: 13 cm water; >9 hours on average, AHI approximately 2.   With persistent hypercapnic respiratory failure despite CPAP use (ABG on 10/17/21 showed 7.29/69/80), I recommend change to BIPAP 18/12;  ABG improved with BiPAP (ABG on 10/18/21 7.34/55/78 after using BiPAP overnight). [facility] in [facility]. If he cannot obtain prior to leaving the hospital, it is okay to d/c him to use his regular CPAP until BiPAP is obtained. · [facility] was [facility] to [place] [facility]  · D/C planning on Levaquin x10 days, if O2 requirement remains </= 5 L. Will follow cultures.    · F/U for imaging abnormality with Dr. Elis Acosta - tentatively plan CT CHEST in 10-12 weeks, office was notified

## 2021-10-19 NOTE — FLOWSHEET NOTE
10/19/21 0822   Vital Signs   Temp 96.4 °F (35.8 °C)   Temp Source Oral   Pulse 84   Resp 16   /72   BP Location Left upper arm   Level of Consciousness Alert (0)   MEWS Score 1   Oxygen Therapy   SpO2 94 %   O2 Device High flow nasal cannula   O2 Flow Rate (L/min) 6 L/min     Patient resting quietly in bed. No s/s of distress noted. Shift assessment complete, see flow sheet. Denies needs at this time. Call light in reach. Will monitor.

## 2021-10-19 NOTE — PROGRESS NOTES
Inpatient Occupational Therapy  Evaluation and Treatment    Unit: PCU  Date:  10/19/2021  Patient Name:    Archana Phillips Admitting diagnosis:  COPD exacerbation ([facility] [J44.1]  COPD with acute exacerbation ([facility] [J44.1]  Admit Date:  10/16/2021  Precautions/Restrictions/WB Status/ Lines/ Wounds/ Oxygen: Fall risk, Lines -IV and Supplemental O2 (5.5) and Telemetry    Treatment Time:  820- 900  Treatment Number: 1   Timed code treatment minutes 30 minutes   Total Treatment minutes:   40   minutes    Patient Goals for Therapy:  \" go home  \"      Discharge Recommendations: Home PRN assist   DME needs for discharge: Needs Met       Therapy recommendations for staff:   Supervision with use of No AD for all ambulation to/from bathroom    History of Present Illness: H&H per [facility]    78 y.o. male with a past medical history of asthma, A. fib on Eliquis, COPD, diabetes, sleep apnea, history of cancer brought in today by EMS from home (lives with daughter) with complaints of breath x3 days. Onset of symptoms x3 days. Duration of symptoms have been persistent since onset. Context includes shortness of breath. He denies fevers chills nausea vomiting diarrhea. Denies chest pain. Denies cough or congestion. Denies sick contacts. He does wear oxygen at home wears 4 L due to COPD he has been wearing his normal baseline oxygen. No aggravating symptoms. No alleviating symptoms. He otherwise denies any other complaints. Nothing seems to make symptoms better or worse    Stage IV prostate cancer, with mets to bladder & possible pelvic lymph node, s/p radiation  Home Health S4 Level Recommendation:  NA  AM-PAC Score: 23    Preadmission Environment     Pt.  Lives dtr and grandson and great grand dtr , Dtr and grandson work   Home environment:    split level home  Steps to enter first floor: one  Steps to enter and One Hand rail  Steps to second floor: Partial flight and One [address] down -where pts bed room/ bathroom , kitchen on upper level   Bathroom: Bath Tub Shower standard commode   Equipment owned: SPC, Rolling Walker, Rollator , Shower Chair, BSC,- all wifes ( who passed away)  home O2 ( 4 L) PRN, pulse ox and inhaler, nebulizer, hospital bed      Preadmission Status:  Pt. Able to drive: Yes  Pt Fully independent with ADLs: Yes except dtr assists with LB dressing due to back problems   Pt. Required assistance from family for: Cleaning,and Kin Stake, Pt will use the microwave,. Pts sister will occasionally stop by and do some cooking   Pt. Fully independent for transfers and gait and walked with a cane at times due to knee problems    History of falls yes slid out of bed and another fall       Pain  No      Cognition    A&O x4   Able to follow 2 step commands    Subjective  Patient sitting up in chair with no family present. Pt agreeable to this OT eval & tx. Upper Extremity ROM:    WFL on the left   Rt shoulder flex limited to approx 90 degrees   Upper Extremity Strength:    3-/5 on the Rt   WFL on the left      Upper Extremity Sensation    WFL    Upper Extremity Proprioception:  WFL    Coordination and Tone  WFL    Balance  Functional Sitting Balance:  WFL  Functional Standing Balance:WFL    Bed mobility:  Pt up in the chair   Supine to sit:   Not Tested  Sit to supine:   Not Tested  Rolling:    Not Tested  Scooting in sitting:  Independent  Scooting to head of bed:   Not Tested    Bridging:   Not Tested    Transfers:    Sit to stand:  Independent  Stand to sit:   Independent  Bed to chair:   Supervision  Standard toilet: Independent  Bed to [facility]:  Not Tested    Dressing:      UE:   Not Tested  LE:    Not Tested    Bathing:    UE:  Independent  LE:  Not Tested    Eating:   Independent    Toileting:  Independent- to sup for lines     Activity Tolerance   Pt completed therapy session with No adverse symptoms noted w/activity  Sp02 96% , Hr 86 on 5.5 liters   Seated after amb to bathroom SpO2 92% HR 94    Positioning Needs:   Pt up in chair, no alarm needed, positioned in proper neutral alignment and pressure relief provided. Exercise / Activities Initiated:   N/A    Patient/Family Education:   Role of OT    Assessment of Deficits: Pt seen for Occupational therapy evaluation in acute care setting. Goal(s) :   No goals to be made the pt does not require OT services        Plan:   D/C OT services .      Shady Alarcon OTR/L [id number]          If patient discharges from this facility prior to next visit, this note will serve as the Discharge Summary

## 2021-10-19 NOTE — PROGRESS NOTES
10/18/21 2339   NIV Type   Equipment Type V60   Mode Bilevel   Mask Type Nasal mask   Mask Size Large   Settings/Measurements   IPAP 18 cmH20   CPAP/EPAP 12 cmH2O   Rate Ordered 16   Resp 23   FiO2  50 %   Vt Exhaled 475 mL   Mask Leak (lpm) 23 lpm   Comfort Level Good   Using Accessory Muscles No   SpO2 97   Alarm Settings   Alarms On Y

## 2021-10-19 NOTE — PROGRESS NOTES
Patient educated on discharge instructions as well as new medications use, dosage, administration and possible side effects. Patient verified knowledge. IV removed without difficulty and dry dressing in place. Telemetry monitor removed and returned to [facility]. Pt left facility in stable condition to Home with all of their personal belongings.

## 2021-10-19 NOTE — CARE COORDINATION
[facility]   Spoke with Antonio Proctor CM regarding pt plan to discharge. Notified [facility] of pt discharge home today with S3 SN, PT/OT, HHA, MSW      LM for pt's caregiver, daughter Jorge Salazar. Telephone call to pt's second contact. Spoke with pt. Agreeable to [facility] for SN, PT/OT, HHA, MSW. Verified demographics. Discussed HCV and [facility] Programs. Pt agreeable. Pt would like Dr. Ashley Ordonez to sign [facility]. orders. Spoke with Antonio Proctor CM to confirm pt would like Dr. Ashley Ordonez signing [facility]. orders. Antonio Proctor confirmed. Per Mario with [facility] note from 10/18, [place] was signing [facility]. orders. Telephone call to Dr. Clay Penkamlesh office to confirm signing [facility]. orders. Spoke with Kayden Candelario who gave VO Dr. Ashley Ordonez will sign for [facility]. orders. Faxed referral with orders to [facility].     Electronically signed by Wil Quach RN on 10/19/2021 at 3:26 PM

## 2021-10-19 NOTE — DISCHARGE SUMMARY
Name:  Maryanne Polanco  Room:  /1621-75  MRN:    [id number]    Discharge Summary      This discharge summary is in conjunction with a complete physical exam done on the day of discharge. Discharging Physician: Dr. Cristiano Gallagher: 10/16/2021  Discharge:  10/19/2021    HPI:  The patient is a 78 y.o. male with asthma, atrial fibrillation anticoagulated on Eliquis, COPD, chronic hypoxic respiratory failure, diabetes, MARIA ELENA and history of metastatic prostate cancer who presented to [facility] ED with complaint of shortness of breath of a couple days duration. Admits to having minimal cough. Denies any fever or chills. No chest pain. No swelling in his lower extremities.     He was hypoxic in the emergency room requiring about 6 L of oxygen. Currently is on 10 L. He uses 4 L of oxygen at home.     Rapid COVID-19 test negative.     He he had the J&J COVID-19 vaccine 4/2021    Diagnoses this Admission and Hospital Course   Groundglass opacities in both lungs on CT. COPD acute exacerbation  Acute on chronic hypoxic respiratory failure  -Presented to the ED with shortness of breath  -Chest x-ray with borderline cardiomegaly with suspected pulmonary edema-> CTPA negative for PE  Rapid COVID-19 test negative. Obtain COVID-19 PCR- negative  -Sputum culture pending  Continued Rocephin and azithromycin day #3.  -Uses 4 L NC O2 at baseline--> now requiring up to 10 L-- 6 L --> 5.5 L   -Continued Solu-Medrol (hx of BG being very sensitive to steroids, monitor closely), albuterol/DuoNebs  Used bipap last night. ABG this am looks better   -Pulmonology consult, follows with Dr. Janak Sibley outpatient  Regrowth of Pseudomonas in respiratory culture DC Rocephin and start cefepime.   Dc on levaquin, prednisone taper.     Possible new onset/Acute CHF exacerbation  Pulmonary hypertension  -Most recent echo from 2019 with EF greater than 65% with asynchronous septal wall motion with moderate LVH and normal diastolic function, reduced RV function and moderate pulmonary hypertension  -BNP mildly elevated and imaging showing pulmonary edema  -Checked echo- normal EF   -IV Lasix 40 mg x 1 dose given in the ED, started IV Lasix 20 mg x 1  -Daily weights, monitored I's and O's, low-sodium diet        Paroxysmal atrial fibrillation  -AC on Eliquis, continued  -Rates mildly over 100 but otherwise controlled  -Continued home Cardizem, beta-blocker  -Continued telemetry monitor     Thrombocytopenia  -Baseline PLT~120-140  -PLT on admission 122  -Anticoagulated on Eliquis  -No evidence of acute bleeding  -Monitored, stable     DM 2 with neuropathy  -Continued home oral medications  -Continued gabapentin  -BG is controlled  -Continued SSI, at home nightly Lantus     Stage IV prostate cancer  -[facility]    -With mets to the bladder and possible pelvic lymph node  -S/p radiation  Not on chemotherapy or antiandrogen therapy now     HLD  - Continued statin      MARIA ELENA  -Continued home CPAP     DVT Prophylaxis: Eliquis      Procedures (Please Review Full Report for Details)  N/A    Consults    Pulmonology       Physical Exam at Discharge:    /72   Pulse 84   Temp 96.4 °F (35.8 °C) (Oral)   Resp 16   Ht 6' (1.829 m)   Wt (!) 314 lb 12.8 oz (142.8 kg)   SpO2 95%   BMI 42.69 kg/m²     See today's progress note    CBC: Recent Labs     10/17/21  0518 10/18/21  0429   WBC 5.6 9.6   HGB 15.0 15.1   HCT 46.6 46.9   MCV 96.5 96.5   * 114*     BMP:   Recent Labs     10/17/21  0518 10/18/21  0429    137   K 5.6* 4.8   CL 96* 95*   CO2 29 29   BUN 16 27*   CREATININE 1.2 1.1       ABG    Lab Results   Component Value Date    QSH2NIS 29.7 10/18/2021    BEART 2.5 10/18/2021    S3CQSXCA 94.8 10/18/2021    PHART 7.346 10/18/2021    SRG6BOH 55.5 10/18/2021    PO2ART 78.3 10/18/2021    EZN0MLV 31.4 10/18/2021         CULTURES  COVID: not detected  Blood: NGTD  Sputum: Pseudomonas    RADIOLOGY  CT Head WO Contrast   Final Result      No evidence for acute intracranial hemorrhage, territorial infarction or   intracranial mass lesion. Mild chronic microangiopathic ischemic disease. Mild generalized volume loss. Complete opacification of left maxillary sinus. CT CHEST PULMONARY EMBOLISM W CONTRAST   Final Result   Addendum 1 of 1   ADDENDUM:   This is correlated with a prior CT chest from 04/30/2019. The previously described mediastinal lymph nodes are slightly larger and    more   numerous. Suggest a follow-up PET scan. The hazy ground-glass opacities scattered along the upper lobes and lung   bases posteriorly which is more prominent with small bibasilar pleural   effusions which are more prominent. Final   Prominent central pulmonary vessels suggestive of pulmonary artery   hypertension or venous congestion with no pulmonary emboli. Mildly dilated and atherosclerotic thoracic aorta with no aneurysm or   dissection. Hazy ground-glass opacities along the upper lobes extending inferiorly which   could represent early multisegmental bronchopneumonia. This pattern of   pneumonia has been described with COVID-19 disease. Small bibasilar pleural effusions and associated bibasilar atelectasis or   infiltrates posteriorly extending into the upper chest.      Mild mediastinal adenopathy which is of uncertain etiology. Suggest   follow-up or PET scan correlation            XR CHEST PORTABLE   Final Result   Borderline cardiomegaly with suspected pulmonary edema.            ECHO-   EF estimated at 55-60%.   No regional wall motion abnormalities are noted.   There is mild concentric left ventricular hypertrophy.   Normal left ventricular diastolic filling pressure.   The right ventricle is moderately enlarged.   Right ventricular systolic function is mildly reduced .   The right atrium is moderately dilated.   The aortic root is mildly dilated.   Mild mitral regurgitation.   Mild tricuspid regurgitation.   Systolic pulmonary artery pressure (SPAP) estimated at 43 mmHg (RA pressure   15 mmHg), consistent with mild pulmonary hypertension.       Discharge Medications     Medication List      START taking these medications    levoFLOXacin 750 MG tablet  Commonly known as: LEVAQUIN  Take 1 tablet by mouth daily for 9 days  Notes to patient: Levofloxacin (Levaquin)  -Use: Treat infection  -Side effects: Headache, diarrhea, nausea and vomiting     predniSONE 20 MG tablet  Commonly known as: DELTASONE  2 qd- 2 days,1 1/2 qd- 3 days, 1 qd- 3 days,1/2 qd- 3 days  Notes to patient: Take 2 tablets on 10/20, 10/21  Take 1.5 tablets on 10/22, 10/23, 10/24  Take 1 tablets on 10/25, 10/26, 10/27  Take 0.5 tablet on 10/28, 10/29, 10/30        CHANGE how you take these medications    OXYGEN  Inhale 6 L into the lungs continuous With concentrator and portability  What changed: how much to take        CONTINUE taking these medications    * albuterol sulfate  (90 Base) MCG/ACT inhaler  Commonly known as: Ventolin HFA  Inhale 2 puffs into the lungs every 6 hours as needed for Wheezing     * albuterol (2.5 MG/3ML) 0.083% nebulizer solution  Commonly known as: PROVENTIL  Take 3 mLs by nebulization every 6 hours as needed for Wheezing     apixaban 5 MG Tabs tablet  Commonly known as: ELIQUIS     Basaglar KwikPen 100 UNIT/ML injection pen  Generic drug: insulin glargine     dilTIAZem 240 MG extended release capsule  Commonly known as: CARDIZEM CD  Take 1 capsule by mouth daily     empagliflozin 25 MG tablet  Commonly known as: JARDIANCE     finasteride 5 MG tablet  Commonly known as: PROSCAR  Take 1 tablet by mouth daily     gabapentin 800 MG tablet  Commonly known as: NEURONTIN     melatonin 3 MG Tabs tablet     metoprolol tartrate 50 MG tablet  Commonly known as: LOPRESSOR     mirtazapine 15 MG tablet  Commonly known as: REMERON  Take 0.5 tablets by mouth nightly     potassium chloride 10 MEQ extended release tablet  Commonly known as: KLOR-CON M     Rosuvastatin Calcium 10 MG Cpsp     SITagliptin 100 MG tablet  Commonly known as: RUSLAN  Take 1 tablet by mouth daily     tamsulosin 0.4 MG capsule  Commonly known as: FLOMAX     VITAMIN D (ERGOCALCIFEROL) PO         * This list has 2 medication(s) that are the same as other medications prescribed for you. Read the directions carefully, and ask your doctor or other care provider to review them with you. Where to Get Your Medications      These medications were sent to [facility] [address] [facility]  [address] [place] [address]    Phone: [phone]   · albuterol sulfate  (90 Base) MCG/ACT inhaler  · levoFLOXacin 750 MG tablet  · predniSONE 20 MG tablet     Information about where to get these medications is not yet available    Ask your nurse or doctor about these medications  · albuterol (2.5 MG/3ML) 0.083% nebulizer solution  · finasteride 5 MG tablet  · mirtazapine 15 MG tablet  · OXYGEN  · SITagliptin 100 MG tablet           Discharged in stable condition to home. Follow Up: Follow up with PCP. Total time spent on discharge is 35 minutes    MARINO Edwards.

## 2021-10-20 ENCOUNTER — TELEPHONE (OUTPATIENT)
Dept: PULMONOLOGY | Age: 66
End: 2021-10-20

## 2021-10-20 LAB
BLOOD CULTURE, ROUTINE: NORMAL
CULTURE, BLOOD 2: NORMAL
CULTURE, RESPIRATORY: ABNORMAL
CULTURE, RESPIRATORY: ABNORMAL
GRAM STAIN RESULT: ABNORMAL
ORGANISM: ABNORMAL

## 2021-10-20 NOTE — CARE COORDINATION
DISCHARGE ORDER  Date/Time 10/20/2021 9:25 AM  Completed by: Ford Frias RN, Case Management    Patient Name: Pablo Oliva. : 1955  Admitting Diagnosis: COPD exacerbation ([facility] [J44.1]  COPD with acute exacerbation ([facility] [J44.1]      Admit order Date and Status:  (verify MD's last order for status of admission)      Noted discharge order. If applicable PT/OT recommendation at Discharge:   Discharge Recommendations: Home PRN assist   DME needs for discharge: Needs Met         Therapy recommendation for EMS Transport: can transport by wheelchair     Therapy recommendations for staff:   Supervision with use of No AD for all transfers and ambulation to/from chair  to/from bathroom      Confirmed discharge plan   Discharge Plan: Discharged home in care of daughter. +home O2 through [employer]. Pt will have Loretta  w/ [facility] (SN/PT/OT/HHA/amd MSW). Orders and CHANI/AVS in [facility].    10/20 0958 ADDENDUM F/U call to patient/daughter regarding DCneeds. VM left w/ dtr to call back CM at  [phone] to review DCP. Call placed to [facility] and  they will assist with follow up with [employer] (O2 supplier) to update them for follow up at home. Daughter expresses interest in [address] [place] [address]. Reviewed chart. Role of discharge planner explained and patient verbalized understanding. Discharge order is noted. Has Home O2 in place on admit:  Yes  Informed of need to bring portable home O2 tank on day of discharge for nursing to connect prior to leaving:   Yes  Verbalized agreement/Understanding:   Yes  Pt is being d/c'd to home today. Pt's O2 sats are 96% on 6 liters. Discharge timeout done with [facility]. All discharge needs and concerns addressed.

## 2021-10-20 NOTE — TELEPHONE ENCOUNTER
MD Hannah Mello MA  F/U with Major in 10-12 weeks with CT CHEST with IV dye -- check creatinine first and use IV dye only if creatinine less than 1.5.  Dx  Mediastinal lymphadenopathy

## 2021-10-21 ENCOUNTER — TELEPHONE (OUTPATIENT)
Dept: PULMONOLOGY | Age: 66
End: 2021-10-21

## 2021-10-21 NOTE — TELEPHONE ENCOUNTER
The pt cancelled followup with me and states he follows up at the [place]. Pt should contact the [place] provider for a new concentrator and to schedule his recommended chest CT. If he wishes to follow up with me instead then please schedule the CT and follow up and send an O2 rx to his DME.

## 2021-10-21 NOTE — TELEPHONE ENCOUNTER
Farshad from [facility] care called stating that pt was to be using 6 LPM home oxygen when d/c from hospital, but pts home concentrator only goes up to 5 LPM.  Pt and home care [facility] and pt is asking to have oxygen ordered through local DME company. Please advise. I did review hospital notes, and could not find required documentation for new O2 set up, so this may hinder the process. Hospital consult Dr. Adrien Hall 10/19/21:    ASSESSMENT:  · Abnormal CT: Previous evidence of air trapping/groundglass opacities on 4/30/2019 CT. Paratracheal lymph nodes appear similar, slightly enlarged over 2 year period per radiology  ? 2019 PET CT mild activity in enlarged right paratracheal lymph node. Has previously seen [facility]. and most recently Dr. Jose Young @ [facility]  ? Stage IV prostate cancer, with mets to bladder & possible pelvic lymph node, s/p radiation  · Chronic hypoxemic respiratory failure with hypercapnia, 4 L baseline. Saw Dr. Chadd Doty 5/2020. Had acute hypercapnic respiratory failure which was life threatening. · COPD, severe, with exacerbation  · Resp cx: Light pseudomonas growth. No hx of this in Epic. · Chronic cor pulmonale with RV dysfunction and pulmonary HTN  · PAfib, on Eliquis &  Cardizem  · MARIA ELENA on home CPAP x 30 yrs, f/b [facility] - did not have CPAP on first night   · Tobacco abuse, 50+ pack year history  · Vaccinated for COVID19     PLAN:  · Supplemental O2 to maintain SaO2 >92%; wean as tolerated    · Completed 3 days Azithromycin 500. On Ceftriaxone D#3  · IV solumedrol 40 BID  · Inhaled bronchodilators   · Echo completed  · Tobacco cessation  · I reviewed CPAP use: 13 cm water; >9 hours on average, AHI approximately 2.   With persistent hypercapnic respiratory failure despite CPAP use (ABG on 10/17/21 showed 7.29/69/80), I recommend change to BIPAP 18/12;  ABG improved with BiPAP (ABG on 10/18/21 7. 34/55/78 after using BiPAP overnight). I have contacted [facility] to assist in obtaining home BiPAP unit. If he cannot obtain prior to leaving the hospital, it is okay to d/c him to use his regular CPAP until BiPAP is obtained. § Rx was faxed to [place] [facility] per [facility] request  · D/C planning on Levaquin x10 days, if O2 requirement remains </= 5 L. Will follow cultures.    · F/U for imaging abnormality with Dr. Juana Mcelroy - tentatively plan CT CHEST in 10-12 weeks, office was notified

## 2021-10-22 NOTE — TELEPHONE ENCOUNTER
[facility] states that the [place] reached out to her and will be taking care of the O2 order. No further assistance needed.

## 2021-10-25 NOTE — PROGRESS NOTES
Physician Progress Note      PATIENT:               Edin Lemon  CSN #:                  [id number]  :                       1955  ADMIT DATE:       10/16/2021 1:39 PM  [address] [place] DATE:        10/19/2021 1:35 PM  RESPONDING  PROVIDER #:        Susana Drew MD          QUERY TEXT:    Pt admitted with COPD exacerbation and has \"Possible new onset/Acute CHF   exacerbation\" documented. Chronic diastolic CHF found in active problem list.     If possible, please document in progress notes and discharge summary   further specificity regarding the type and acuity of CHF:    The medical record reflects the following:  Risk Factors: chronic diastolic CHF, HTN  Clinical Indicators: pulmonary HTN, EF 55-60%,reduced RV function per ECHO,   suspected pulmonary edema per CXR, BNP 1721, acute respiratory failure  Treatment: ECHO, IV Lasix, daily weights, I&O's, low sodium diet, supplemental   oxygen, supportive care    Thank you,  Holli Vital RN, CDS  [phone]  Options provided:  -- Acute on Chronic Diastolic CHF/HFpEF  -- Acute Diastolic CHF/HFpEF  -- Chronic Diastolic CHF/HFpEF  -- Other - I will add my own diagnosis  -- Disagree - Not applicable / Not valid  -- Disagree - Clinically unable to determine / Unknown  -- Refer to Clinical Documentation Reviewer    PROVIDER RESPONSE TEXT:    This patient is in acute diastolic CHF/HFpEF.     Query created by: Aquiles Burnham on 10/25/2021 9:55 AM      Electronically signed by:  Susana Drew MD 10/25/2021 10:05 AM

## 2023-03-31 NOTE — DISCHARGE INSTR - COC
As a follow-up, a second attempt has been made for outreach via fax to facility  Please see Contacts section for details      Thank you  Shiva Lopez MA Continuity of Care Form    Patient Name: George Scherer    :  1955  MRN:  [id number]    Admit date:  10/16/2021  Discharge date:  10/19/2021    Code Status Order: Full Code   Advance Directives:     Admitting Physician:  Nic Gutierrez MD  PCP: Adolfo Gaytan MD    Discharging Nurse:     [address] Unit/Room#: /9695-12  Discharging Unit Phone Number: [phone]    Emergency Contact:   Extended Emergency Contact Information  Primary Emergency Contact: Garret Land  Address: [address] [place] [address] Phone: [phone]  Mobile Phone: [phone]  Relation: Lay Caregiver    Past Surgical History:  Past Surgical History:   Procedure Laterality Date    ATRIAL ABLATION SURGERY      CARPAL TUNNEL RELEASE      KNEE ARTHROSCOPY      NECK SURGERY      TONSILLECTOMY         Immunization History:   Immunization History   Administered Date(s) Administered    COVID-19, J&J, PF, 0.5 mL 2021    Influenza Vaccine, unspecified formulation 2018    Influenza Virus Vaccine 10/20/2010, 2012    Influenza Whole 10/20/2010, 2012    Japanese Encephalitis, IM (Ixiaro) 10/20/2010    Pneumococcal Polysaccharide ([id number]) 10/20/2010    Tdap (Boostrix, Adacel) 10/20/2010       Active Problems:  Patient Active Problem List   Diagnosis Code    Increased BMI R63.8    Essential hypertension I10    Diabetes mellitus ([address] E11.9    Ventral hernia K43.9    ARF (acute renal failure) ([address] F71.7    Metabolic acidosis I42.1    Hyperkalemia E87.5    Acute pulmonary edema (HCC) J81.0    BPH (benign prostatic hyperplasia) N40.0    Acute urinary retention R33.8    Acute on chronic respiratory failure (HCC) J96.20    Obstructive sleep apnea G47.33    Pulmonary hypertension (HCC) I27.20    Cor pulmonale, chronic (HCC) I27.81    Type 2 diabetes mellitus with hyperglycemia ([address] E11.65    Chronic venous hypertension involving both sides I87.303    Acute cor pulmonale (HCC) I26.09    Chronic obstructive pulmonary disease with acute exacerbation (HCC) J44.1    Chronic respiratory failure with hypoxia (HCC) J96.11    Atrial fibrillation, controlled (HCC) I48.91    Acute on chronic respiratory failure with hypoxia (HCC) J96.21    Acute on chronic respiratory failure with hypoxia and hypercapnia (HCC) J96.21, J96.22    Atrial fibrillation with RVR (HCC) I48.91    Chronic anticoagulation Z79.01    Chronic diastolic congestive heart failure (HCC) I50.32    COPD, severe (HCC) J44.9    Tobacco abuse Z72.0    COPD exacerbation (HCC) J44.1    Abnormal chest CT R93.89    Prostate cancer (HCC) C61       Isolation/Infection:   Isolation          No Isolation        Patient Infection Status     Infection Onset Added Last Indicated Last Indicated By Review Planned Expiration Resolved Resolved By    None active    Resolved    COVID-19 Rule Out 10/17/21 10/17/21 10/17/21 COVID-19 & Influenza Combo (Ordered)   10/17/21 Rule-Out Test Resulted    COVID-19 Rule Out 10/16/21 10/16/21 10/16/21 COVID-19, Rapid (Ordered)   10/16/21 Rule-Out Test Resulted          Nurse Assessment:  Last Vital Signs: /62   Pulse 87   Temp 97.2 °F (36.2 °C) (Oral)   Resp 20   Ht 6' (1.829 m)   Wt (!) 308 lb 6.4 oz (139.9 kg)   SpO2 92%   BMI 41.83 kg/m²     Last documented pain score (0-10 scale):    Last Weight:   Wt Readings from Last 1 Encounters:   10/18/21 (!) 308 lb 6.4 oz (139.9 kg)     Mental Status:  oriented and alert    IV Access:  - None    Nursing Mobility/ADLs:  Walking   Independent  Transfer  Independent  Bathing  Independent  Dressing  Independent  Toileting  Independent  Feeding  Independent  Med Admin  Independent  Med Delivery   whole    Wound Care Documentation and Therapy:        Elimination:  Continence:   · Bowel:  Yes  · Bladder: Yes  Urinary Catheter: None   Colostomy/Ileostomy/Ileal Conduit: No       Date of Last BM:     Intake/Output Summary (Last 24 hours) at 10/18/2021 1452  Last data filed at 10/18/2021 1421  Gross per 24 hour   Intake 762 ml   Output 3200 ml   Net -2438 ml     I/O last 3 completed shifts: In: 762 [P.O.:762]  Out: 1950 [Urine:1950]    Safety Concerns:     None    Impairments/Disabilities:      None    Nutrition Therapy:  Current Nutrition Therapy:   - Oral Diet:  Carb Control 4 carbs/meal (1800kcals/day)    Routes of Feeding: Oral  Liquids: Thin Liquids  Daily Fluid Restriction: no  Last Modified Barium Swallow with Video (Video Swallowing Test): not done    Treatments at the Time of Hospital Discharge:   Respiratory Treatments:   Oxygen Therapy:  is on oxygen at 6 L/min per nasal cannula.   Ventilator:    - No ventilator support    Rehab Therapies: Physical Therapy, Occupational Therapy and Skilled Nurse, [place], [address]: LEVEL [address] [facility] [address] to establish plan of care for patient over 60 day period   Nursing  Initial home SN evaluation visit to occur within 24-48 hours for:  1)  medication management  2)  VS and clinical assessment  3)  S&S chronic disease exacerbation education + when to contact MD/NP  4)  care coordination  Medication Reconciliation during 1st SN visit  PT/OT/Speech   Evaluations in home within 24-48 hours of discharge to include DME and home safety   Frontload therapy 5 days, then 3x a week   OT to evaluate if patient has [address] needs for personal care    evaluation within 24-48 hours to evaluate resources & insurance for potential AL, [place], LTC, and Medicaid options   Palliative Care referral within 5 days of hospital discharge   PCP Visit scheduled within 3 - 7 days of hospital discharge [facility] [address] (If patient is agreeable and meets guidelines)        Weight Bearing Status/Restrictions: No weight bearing restirctions  Other Medical Equipment (for information only, NOT a DME order):  cane  Other Treatments:     Patient's personal belongings (please select all that are sent with patient):  None    RN SIGNATURE:  Electronically signed by Kayden Luke RN on 10/19/21 at 12:02 PM EDT    CASE MANAGEMENT/SOCIAL WORK SECTION    Inpatient Status Date: 10/16/2021    Readmission Risk Assessment Score:  Readmission Risk              Risk of Unplanned Readmission:  20           · Discharging to Facility/ Agency   · Name:  [facility]    · Address: [address] [place] [address] [address] [facility] [place], [address] [place] [address]  · Phone: [phone]  · Fax: [phone]      / signature: Electronically signed by Ortiz Kraus RN on 10/19/21 at 10:49 AM EDT    PHYSICIAN SECTION    Prognosis: {Prognosis:[id number]}    Condition at Discharge: [address] Patient Condition:[id number]}    Rehab Potential (if transferring to Rehab): {Prognosis:[id number]}    Recommended Labs or Other Treatments After Discharge: SN, PT/OT, HHA, MSW  S3  HCV and Zoom Program    Physician Certification: I certify the above information and transfer of Silvia Ardon.  is necessary for the continuing treatment of the diagnosis listed and that he requires [address] for greater 30 days.      Update Admission H&P: Changes in H&P as follows - see attached    PHYSICIAN SIGNATURE: Dr. Heri Sheldon MD/ Electronically signed by Ortiz Kraus RN on 10/19/21 at 10:49 AM EDT

## 2024-06-20 NOTE — PROGRESS NOTES
Admit: 10/16/2021    Name:  Erick Dear  [id number]  MRN:    [id number]     Daily Progress Note for 10/18/2021   Admitted with acute on chronic hypoxic respiratory failure acute COPD exacerbation    Uses 4 L at home    Interval History:     Used bipap all night  ABG this am is better   Now on 6 L     Scheduled Meds:   cefTRIAXone (ROCEPHIN) IV  2,000 mg IntraVENous Q24H    methylPREDNISolone  40 mg IntraVENous Q12H    albuterol-ipratropium  1 puff Inhalation Q4H While awake    apixaban  5 mg Oral BID    insulin glargine  20 Units SubCUTAneous Nightly    dilTIAZem  240 mg Oral Daily    empagliflozin  25 mg Oral Daily    finasteride  5 mg Oral Daily    gabapentin  800 mg Oral TID    metoprolol tartrate  25 mg Oral BID    mirtazapine  7.5 mg Oral Nightly    rosuvastatin  5 mg Oral Daily    alogliptin  12.5 mg Oral Daily    tamsulosin  0.8 mg Oral Daily    sodium chloride flush  5-40 mL IntraVENous 2 times per day    insulin lispro  0-6 Units SubCUTAneous TID WC    insulin lispro  0-3 Units SubCUTAneous Nightly    azithromycin  500 mg IntraVENous Q24H       Continuous Infusions:   dextrose      sodium chloride 25 mL (10/18/21 0838)       PRN Meds:  glucose, dextrose, glucagon (rDNA), dextrose, sodium chloride flush, sodium chloride, ondansetron **OR** ondansetron, polyethylene glycol, acetaminophen **OR** acetaminophen, perflutren lipid microspheres, ipratropium-albuterol                  Objective:     Temp  Av.8 °F (36.6 °C)  Min: 97.1 °F (36.2 °C)  Max: 98.7 °F (37.1 °C)  Pulse  Av.2  Min: 35  Max: 105  BP  Min: 107/60  Max: 129/66  SpO2  Av.9 %  Min: 90 %  Max: 98 %  FiO2   Av %  Min: 20 %  Max: 50 %  Patient Vitals for the past 4 hrs:   BP Temp Temp src Pulse Resp SpO2   10/18/21 1038 112/76 -- -- 105 -- --   10/18/21 1032 -- -- -- (!) 35 -- --   10/18/21 0834 129/66 97.1 °F (36.2 °C) Oral 97 22 95 %   10/18/21 0729 -- -- -- -- 20 93 %         Intake/Output Patients spouse called to schedule hospital follow up.    Summary (Last 24 hours) at 10/18/2021 1050  Last data filed at 10/18/2021 0902  Gross per 24 hour   Intake 822 ml   Output 2200 ml   Net -1378 ml       Physical Exam:  Gen: No distress. Alert. Eyes: PERRL. No sclera icterus. No conjunctival injection. ENT: No discharge. Pharynx clear. Neck: No JVD. No Carotid Bruit. Trachea midline. Resp: No accessory muscle use. Bibasilar crackles. No wheezes. No rhonchi. CV: Regular rate. Regular rhythm. No murmur. No rub. No edema. Capillary Refill: Brisk,< 3 seconds   Peripheral Pulses: +2 palpable, equal bilaterally   GI: Non-tender. Non-distended. No masses. No organomegaly. Normal bowel sounds. No hernia. Skin: Warm and dry. No nodule on exposed extremities. No rash on exposed extremities. M/S: No cyanosis. No joint deformity. No clubbing. Neuro: Awake. Grossly nonfocal    Psych: Oriented x 3. No anxiety or agitation. Lab Data:  CBC:   Recent Labs     10/16/21  1402 10/17/21  0518 10/18/21  0429   WBC 5.5 5.6 9.6   RBC 5.01 4.83 4.86   HGB 15.3 15.0 15.1   HCT 49.0 46.6 46.9   MCV 97.7 96.5 96.5   RDW 17.6* 17.1* 17.1*   * 118* 114*     BMP:   Recent Labs     10/16/21  1402 10/17/21  0518 10/18/21  0429    136 137   K 4.4 5.6* 4.8   CL 96* 96* 95*   CO2 30 29 29   BUN 14 16 27*   CREATININE 1.2 1.2 1.1     BNP: No results for input(s): BNP in the last 72 hours. PT/INR: No results for input(s): PROTIME, INR in the last 72 hours. APTT:No results for input(s): APTT in the last 72 hours. CARDIAC ENZYMES:   Recent Labs     10/16/21  1402   TROPONINI <0.01     FASTING LIPID PANEL:  Lab Results   Component Value Date    CHOL 168 01/01/2019    HDL 29 01/01/2019    HDL 38 03/14/2011    TRIG 309 01/01/2019     LIVER PROFILE:   Recent Labs     10/16/21  1402   AST 17   ALT 13   BILITOT 1.3*   ALKPHOS 88         CT Head WO Contrast   Final Result      No evidence for acute intracranial hemorrhage, territorial infarction or   intracranial mass lesion. [facility])     Chronic obstructive pulmonary disease with acute exacerbation (HCC)     Chronic respiratory failure with hypoxia (HCC)     Atrial fibrillation, controlled ([address]     Chronic venous hypertension involving both sides 03/01/2019    Pulmonary hypertension (HCC)     Cor pulmonale, chronic (HCC)     Type 2 diabetes mellitus with hyperglycemia (HCC)     Metabolic acidosis 98/22/5669    Hyperkalemia 06/06/2017    Acute pulmonary edema (HCC) 06/06/2017    BPH (benign prostatic hyperplasia) 06/06/2017    Acute urinary retention 06/06/2017    Acute on chronic respiratory failure (HCC)     Obstructive sleep apnea     ARF (acute renal failure) ([address] 06/05/2017    Ventral hernia 03/14/2011    Increased BMI 09/27/2010    Essential hypertension 09/27/2010    Diabetes mellitus ([facility] 09/27/2010     Groundglass opacities in both lungs on CT. COPD acute exacerbation  Acute on chronic hypoxic respiratory failure  -Presented to the ED with shortness of breath  -Chest x-ray with borderline cardiomegaly with suspected pulmonary edema-> CTPA negative for PE  Rapid COVID-19 test negative. Obtain COVID-19 PCR- negative  -Sputum culture pending  Continue Rocephin and azithromycin day #3.  -Uses 4 L NC O2 at baseline--> now requiring up to 10 L-- 6 L   -Continue Solu-Medrol (hx of BG being very sensitive to steroids, monitor closely), albuterol/DuoNebs  Used bipap last night.  ABG this am looks better   -Pulmonology consult, follows with Dr. Jone Noland outpatient     Possible new onset/Acute CHF exacerbation  Pulmonary hypertension  -Most recent echo from 2019 with EF greater than 65% with asynchronous septal wall motion with moderate LVH and normal diastolic function, reduced RV function and moderate pulmonary hypertension  -BNP mildly elevated and imaging showing pulmonary edema  -Check echo  -IV Lasix 40 mg x 1 dose given in the ED, start IV Lasix 20 mg x 1  -Daily weights, monitor I's and O's, low-sodium diet        Paroxysmal atrial fibrillation  -AC on Eliquis, continue  -Rates mildly over 100 but otherwise controlled  -Continue home Cardizem, beta-blocker  -Continue telemetry monitor     Thrombocytopenia  -Baseline PLT~120-140  -PLT on admission 122  -Anticoagulated on Eliquis  -No evidence of acute bleeding  -Monitor, stable     DM 2 with neuropathy  -Continue home oral medications  -Continue gabapentin  -BG is controlled  -Continue SSI at home nightly Lantus     Stage IV prostate cancer  -Followed by [facility]    -With mets to the bladder and possible pelvic lymph node  -S/p radiation  Not on chemotherapy or antiandrogen therapy now     HLD  - Continue statin      MARIA ELENA  -Continue home CPAP     DVT Prophylaxis: Eliquis   Diet: ADULT DIET;  Regular; 4 carb choices (60 gm/meal)  Code Status: Full Code      Cara Zaragoza MD